# Patient Record
Sex: MALE | Race: WHITE | NOT HISPANIC OR LATINO | URBAN - METROPOLITAN AREA
[De-identification: names, ages, dates, MRNs, and addresses within clinical notes are randomized per-mention and may not be internally consistent; named-entity substitution may affect disease eponyms.]

---

## 2018-05-02 ENCOUNTER — OUTPATIENT (OUTPATIENT)
Dept: OUTPATIENT SERVICES | Facility: HOSPITAL | Age: 58
LOS: 1 days | Discharge: HOME | End: 2018-05-02

## 2018-05-02 VITALS
OXYGEN SATURATION: 100 % | HEIGHT: 70 IN | SYSTOLIC BLOOD PRESSURE: 124 MMHG | TEMPERATURE: 96 F | DIASTOLIC BLOOD PRESSURE: 69 MMHG | RESPIRATION RATE: 16 BRPM | HEART RATE: 72 BPM | WEIGHT: 195.99 LBS

## 2018-05-02 DIAGNOSIS — Z98.890 OTHER SPECIFIED POSTPROCEDURAL STATES: Chronic | ICD-10-CM

## 2018-05-02 DIAGNOSIS — M65.341 TRIGGER FINGER, RIGHT RING FINGER: ICD-10-CM

## 2018-05-02 DIAGNOSIS — Z01.818 ENCOUNTER FOR OTHER PREPROCEDURAL EXAMINATION: ICD-10-CM

## 2018-05-02 LAB
ALBUMIN SERPL ELPH-MCNC: 4.8 G/DL — SIGNIFICANT CHANGE UP (ref 3.5–5.2)
ALP SERPL-CCNC: 87 U/L — SIGNIFICANT CHANGE UP (ref 30–115)
ALT FLD-CCNC: 77 U/L — HIGH (ref 0–41)
ANION GAP SERPL CALC-SCNC: 15 MMOL/L — HIGH (ref 7–14)
APTT BLD: 28.2 SEC — SIGNIFICANT CHANGE UP (ref 27–39.2)
AST SERPL-CCNC: 61 U/L — HIGH (ref 0–41)
BASOPHILS # BLD AUTO: 0.02 K/UL — SIGNIFICANT CHANGE UP (ref 0–0.2)
BASOPHILS NFR BLD AUTO: 0.4 % — SIGNIFICANT CHANGE UP (ref 0–1)
BILIRUB SERPL-MCNC: 0.9 MG/DL — SIGNIFICANT CHANGE UP (ref 0.2–1.2)
BUN SERPL-MCNC: 12 MG/DL — SIGNIFICANT CHANGE UP (ref 10–20)
CALCIUM SERPL-MCNC: 9.6 MG/DL — SIGNIFICANT CHANGE UP (ref 8.5–10.1)
CHLORIDE SERPL-SCNC: 100 MMOL/L — SIGNIFICANT CHANGE UP (ref 98–110)
CO2 SERPL-SCNC: 29 MMOL/L — SIGNIFICANT CHANGE UP (ref 17–32)
CREAT SERPL-MCNC: 0.7 MG/DL — SIGNIFICANT CHANGE UP (ref 0.7–1.5)
EOSINOPHIL # BLD AUTO: 0.04 K/UL — SIGNIFICANT CHANGE UP (ref 0–0.7)
EOSINOPHIL NFR BLD AUTO: 0.8 % — SIGNIFICANT CHANGE UP (ref 0–8)
GLUCOSE SERPL-MCNC: 90 MG/DL — SIGNIFICANT CHANGE UP (ref 70–99)
HCT VFR BLD CALC: 43.1 % — SIGNIFICANT CHANGE UP (ref 42–52)
HGB BLD-MCNC: 14.9 G/DL — SIGNIFICANT CHANGE UP (ref 14–18)
IMM GRANULOCYTES NFR BLD AUTO: 0.4 % — HIGH (ref 0.1–0.3)
INR BLD: 0.91 RATIO — SIGNIFICANT CHANGE UP (ref 0.65–1.3)
LYMPHOCYTES # BLD AUTO: 1.3 K/UL — SIGNIFICANT CHANGE UP (ref 1.2–3.4)
LYMPHOCYTES # BLD AUTO: 25.7 % — SIGNIFICANT CHANGE UP (ref 20.5–51.1)
MCHC RBC-ENTMCNC: 28.9 PG — SIGNIFICANT CHANGE UP (ref 27–31)
MCHC RBC-ENTMCNC: 34.6 G/DL — SIGNIFICANT CHANGE UP (ref 32–37)
MCV RBC AUTO: 83.7 FL — SIGNIFICANT CHANGE UP (ref 80–94)
MONOCYTES # BLD AUTO: 0.6 K/UL — SIGNIFICANT CHANGE UP (ref 0.1–0.6)
MONOCYTES NFR BLD AUTO: 11.9 % — HIGH (ref 1.7–9.3)
NEUTROPHILS # BLD AUTO: 3.07 K/UL — SIGNIFICANT CHANGE UP (ref 1.4–6.5)
NEUTROPHILS NFR BLD AUTO: 60.8 % — SIGNIFICANT CHANGE UP (ref 42.2–75.2)
NRBC # BLD: 0 /100 WBCS — SIGNIFICANT CHANGE UP (ref 0–0)
PLATELET # BLD AUTO: 107 K/UL — LOW (ref 130–400)
POTASSIUM SERPL-MCNC: 4.4 MMOL/L — SIGNIFICANT CHANGE UP (ref 3.5–5)
POTASSIUM SERPL-SCNC: 4.4 MMOL/L — SIGNIFICANT CHANGE UP (ref 3.5–5)
PROT SERPL-MCNC: 7.7 G/DL — SIGNIFICANT CHANGE UP (ref 6–8)
PROTHROM AB SERPL-ACNC: 9.8 SEC — LOW (ref 9.95–12.87)
RBC # BLD: 5.15 M/UL — SIGNIFICANT CHANGE UP (ref 4.7–6.1)
RBC # FLD: 13.6 % — SIGNIFICANT CHANGE UP (ref 11.5–14.5)
SODIUM SERPL-SCNC: 144 MMOL/L — SIGNIFICANT CHANGE UP (ref 135–146)
WBC # BLD: 5.05 K/UL — SIGNIFICANT CHANGE UP (ref 4.8–10.8)
WBC # FLD AUTO: 5.05 K/UL — SIGNIFICANT CHANGE UP (ref 4.8–10.8)

## 2018-05-02 NOTE — H&P PST ADULT - REASON FOR ADMISSION
57 yr old man to past for  right open carpal tunnel release and little finger trigger release under lsb dr irvin rt hand dominant noted s/s  x 20 yrs now for sx no fever no uti uri cp palp sob pain at this time ex ryan 3 fos no lissett screen revd

## 2018-05-02 NOTE — H&P PST ADULT - ADDITIONAL PE
no lissett no loose teeth tmd > 3 fbd neck from rt hand dominant rt little finger trigger finger noted cts b/l per pt + pulses

## 2018-05-02 NOTE — H&P PST ADULT - FAMILY HISTORY
Mother  Still living? Unknown  CAD (coronary artery disease), Age at diagnosis: Age Unknown  HTN (hypertension), Age at diagnosis: Age Unknown     Father  Still living? Unknown  HTN (hypertension), Age at diagnosis: Age Unknown

## 2018-05-02 NOTE — H&P PST ADULT - PMH
Hepatitis C, chronic  dx 2yr no tx per pt  HIV (human immunodeficiency virus infection)  x 25 yrs  Thrombocytopenia  2 yr plt ct low 32 high 150

## 2018-05-02 NOTE — H&P PST ADULT - NSANTHOSAYNRD_GEN_A_CORE
No. MARLEE screening performed.  STOP BANG Legend: 0-2 = LOW Risk; 3-4 = INTERMEDIATE Risk; 5-8 = HIGH Risk

## 2018-05-02 NOTE — H&P PST ADULT - PSH
History of surgery  lt hip i and d 24 yrs  History of surgery  rt elbow sx child  bone spur  History of surgery  ls laminectomy  97 98

## 2018-05-09 PROBLEM — D69.6 THROMBOCYTOPENIA, UNSPECIFIED: Chronic | Status: ACTIVE | Noted: 2018-05-02

## 2018-05-09 PROBLEM — B20 HUMAN IMMUNODEFICIENCY VIRUS [HIV] DISEASE: Chronic | Status: ACTIVE | Noted: 2018-05-02

## 2018-05-09 PROBLEM — B18.2 CHRONIC VIRAL HEPATITIS C: Chronic | Status: ACTIVE | Noted: 2018-05-02

## 2018-05-16 ENCOUNTER — OUTPATIENT (OUTPATIENT)
Dept: OUTPATIENT SERVICES | Facility: HOSPITAL | Age: 58
LOS: 1 days | Discharge: HOME | End: 2018-05-16

## 2018-05-16 VITALS — RESPIRATION RATE: 20 BRPM | OXYGEN SATURATION: 96 % | TEMPERATURE: 98 F | HEART RATE: 80 BPM

## 2018-05-16 VITALS
RESPIRATION RATE: 18 BRPM | OXYGEN SATURATION: 96 % | HEIGHT: 70 IN | DIASTOLIC BLOOD PRESSURE: 86 MMHG | HEART RATE: 86 BPM | WEIGHT: 195.99 LBS | SYSTOLIC BLOOD PRESSURE: 140 MMHG | TEMPERATURE: 99 F

## 2018-05-16 DIAGNOSIS — Z98.890 OTHER SPECIFIED POSTPROCEDURAL STATES: Chronic | ICD-10-CM

## 2018-05-16 RX ORDER — HYDROMORPHONE HYDROCHLORIDE 2 MG/ML
1 INJECTION INTRAMUSCULAR; INTRAVENOUS; SUBCUTANEOUS
Qty: 0 | Refills: 0 | Status: DISCONTINUED | OUTPATIENT
Start: 2018-05-16 | End: 2018-05-16

## 2018-05-16 RX ORDER — IBUPROFEN 200 MG
1 TABLET ORAL
Qty: 20 | Refills: 0 | OUTPATIENT
Start: 2018-05-16

## 2018-05-16 RX ORDER — HYDROMORPHONE HYDROCHLORIDE 2 MG/ML
0.5 INJECTION INTRAMUSCULAR; INTRAVENOUS; SUBCUTANEOUS
Qty: 0 | Refills: 0 | Status: DISCONTINUED | OUTPATIENT
Start: 2018-05-16 | End: 2018-05-16

## 2018-05-16 RX ORDER — SODIUM CHLORIDE 9 MG/ML
1000 INJECTION, SOLUTION INTRAVENOUS
Qty: 0 | Refills: 0 | Status: DISCONTINUED | OUTPATIENT
Start: 2018-05-16 | End: 2018-05-31

## 2018-05-16 RX ORDER — OXYCODONE AND ACETAMINOPHEN 5; 325 MG/1; MG/1
1 TABLET ORAL EVERY 4 HOURS
Qty: 0 | Refills: 0 | Status: DISCONTINUED | OUTPATIENT
Start: 2018-05-16 | End: 2018-05-16

## 2018-05-16 RX ORDER — ONDANSETRON 8 MG/1
4 TABLET, FILM COATED ORAL ONCE
Qty: 0 | Refills: 0 | Status: DISCONTINUED | OUTPATIENT
Start: 2018-05-16 | End: 2018-05-31

## 2018-05-16 RX ADMIN — SODIUM CHLORIDE 100 MILLILITER(S): 9 INJECTION, SOLUTION INTRAVENOUS at 15:27

## 2018-05-16 NOTE — BRIEF OPERATIVE NOTE - POST-OP DX
Carpal tunnel syndrome of right wrist  05/16/2018    Active  Black Renee  Trigger finger, right little finger  05/16/2018    Active  Black Renee

## 2018-05-16 NOTE — BRIEF OPERATIVE NOTE - PROCEDURE
<<-----Click on this checkbox to enter Procedure Release of trigger finger of right little finger  05/16/2018    Active  KRISTINE  Open release of right carpal tunnel  05/16/2018    Active  KRISTINE

## 2018-05-16 NOTE — ASU DISCHARGE PLAN (ADULT/PEDIATRIC). - SPECIAL INSTRUCTIONS

## 2018-05-22 DIAGNOSIS — Z21 ASYMPTOMATIC HUMAN IMMUNODEFICIENCY VIRUS [HIV] INFECTION STATUS: ICD-10-CM

## 2018-05-22 DIAGNOSIS — Z88.0 ALLERGY STATUS TO PENICILLIN: ICD-10-CM

## 2018-05-22 DIAGNOSIS — G56.01 CARPAL TUNNEL SYNDROME, RIGHT UPPER LIMB: ICD-10-CM

## 2018-05-22 DIAGNOSIS — D69.6 THROMBOCYTOPENIA, UNSPECIFIED: ICD-10-CM

## 2018-05-22 DIAGNOSIS — M65.351 TRIGGER FINGER, RIGHT LITTLE FINGER: ICD-10-CM

## 2018-05-22 DIAGNOSIS — F17.210 NICOTINE DEPENDENCE, CIGARETTES, UNCOMPLICATED: ICD-10-CM

## 2018-06-20 ENCOUNTER — OUTPATIENT (OUTPATIENT)
Dept: OUTPATIENT SERVICES | Facility: HOSPITAL | Age: 58
LOS: 1 days | Discharge: HOME | End: 2018-06-20

## 2018-06-20 VITALS
SYSTOLIC BLOOD PRESSURE: 170 MMHG | HEART RATE: 73 BPM | HEIGHT: 70 IN | OXYGEN SATURATION: 96 % | DIASTOLIC BLOOD PRESSURE: 89 MMHG | TEMPERATURE: 99 F | WEIGHT: 195.99 LBS | RESPIRATION RATE: 20 BRPM

## 2018-06-20 VITALS
OXYGEN SATURATION: 97 % | HEART RATE: 58 BPM | DIASTOLIC BLOOD PRESSURE: 81 MMHG | RESPIRATION RATE: 18 BRPM | SYSTOLIC BLOOD PRESSURE: 146 MMHG

## 2018-06-20 DIAGNOSIS — Z98.890 OTHER SPECIFIED POSTPROCEDURAL STATES: Chronic | ICD-10-CM

## 2018-06-20 RX ORDER — ONDANSETRON 8 MG/1
4 TABLET, FILM COATED ORAL ONCE
Qty: 0 | Refills: 0 | Status: DISCONTINUED | OUTPATIENT
Start: 2018-06-20 | End: 2018-07-05

## 2018-06-20 RX ORDER — SODIUM CHLORIDE 9 MG/ML
1000 INJECTION, SOLUTION INTRAVENOUS
Qty: 0 | Refills: 0 | Status: DISCONTINUED | OUTPATIENT
Start: 2018-06-20 | End: 2018-07-05

## 2018-06-20 RX ORDER — MORPHINE SULFATE 50 MG/1
2 CAPSULE, EXTENDED RELEASE ORAL ONCE
Qty: 0 | Refills: 0 | Status: DISCONTINUED | OUTPATIENT
Start: 2018-06-20 | End: 2018-06-20

## 2018-06-20 RX ORDER — IBUPROFEN 200 MG
1 TABLET ORAL
Qty: 20 | Refills: 0
Start: 2018-06-20

## 2018-06-20 RX ORDER — OXYCODONE AND ACETAMINOPHEN 5; 325 MG/1; MG/1
1 TABLET ORAL ONCE
Qty: 0 | Refills: 0 | Status: DISCONTINUED | OUTPATIENT
Start: 2018-06-20 | End: 2018-06-20

## 2018-06-20 RX ADMIN — SODIUM CHLORIDE 100 MILLILITER(S): 9 INJECTION, SOLUTION INTRAVENOUS at 08:34

## 2018-06-20 NOTE — BRIEF OPERATIVE NOTE - POST-OP DX
Carpal tunnel syndrome of left wrist  06/20/2018    Active  Black Renee  Trigger finger, left middle finger  06/20/2018    Active  Black Renee  Trigger finger, left ring finger  06/20/2018    Active  Black Renee

## 2018-06-20 NOTE — BRIEF OPERATIVE NOTE - PROCEDURE
<<-----Click on this checkbox to enter Procedure Release of trigger finger of left ring finger  06/20/2018    Active  VRUGGIERO  Release of trigger finger of left middle finger  06/20/2018    Active  VRUGGIERO  Open release of left carpal tunnel  06/20/2018    Active  VRUGGIERO

## 2018-06-20 NOTE — ASU DISCHARGE PLAN (ADULT/PEDIATRIC). - SPECIAL INSTRUCTIONS

## 2018-06-20 NOTE — CHART NOTE - NSCHARTNOTEFT_GEN_A_CORE
PACU ANESTHESIA ADMISSION NOTE      Procedure: left open carpal tunnel release and left middle finger and left ringer trigger release  Post op diagnosis:  left carpal tunnel    ____  Intubated  TV:______       Rate: ______      FiO2: ______    _x___  Patent Airway    __x__  Full return of protective reflexes    __x__  Full recovery from anesthesia / back to baseline     Vitals:   T:    98       R:     20             BP:   130/77               Sat:      97             P: 62      Mental Status:  ____ xAwake   _____ Alert   _____ Drowsy   _____ Sedated    Nausea/Vomiting:  __x_ NO  ______Yes,   See Post - Op Orders          Pain Scale (0-10):  ____0_    Treatment: ____ None    ____ See Post - Op/PCA Orders    Post - Operative Fluids:   ____ Oral   __x__ See Post - Op Orders    Plan: Discharge:   _x___Home       _____Floor     _____Critical Care    _____  Other:_________________    Comments: Pt with no acute signs or symptoms of anesthesia complications.

## 2018-06-25 DIAGNOSIS — M65.332 TRIGGER FINGER, LEFT MIDDLE FINGER: ICD-10-CM

## 2018-06-25 DIAGNOSIS — D69.6 THROMBOCYTOPENIA, UNSPECIFIED: ICD-10-CM

## 2018-06-25 DIAGNOSIS — B18.2 CHRONIC VIRAL HEPATITIS C: ICD-10-CM

## 2018-06-25 DIAGNOSIS — Z88.0 ALLERGY STATUS TO PENICILLIN: ICD-10-CM

## 2018-06-25 DIAGNOSIS — F10.10 ALCOHOL ABUSE, UNCOMPLICATED: ICD-10-CM

## 2018-06-25 DIAGNOSIS — G56.02 CARPAL TUNNEL SYNDROME, LEFT UPPER LIMB: ICD-10-CM

## 2018-06-25 DIAGNOSIS — M65.342 TRIGGER FINGER, LEFT RING FINGER: ICD-10-CM

## 2018-06-25 DIAGNOSIS — B20 HUMAN IMMUNODEFICIENCY VIRUS [HIV] DISEASE: ICD-10-CM

## 2018-06-25 DIAGNOSIS — F17.210 NICOTINE DEPENDENCE, CIGARETTES, UNCOMPLICATED: ICD-10-CM

## 2019-09-18 ENCOUNTER — TRANSCRIPTION ENCOUNTER (OUTPATIENT)
Age: 59
End: 2019-09-18

## 2020-02-05 PROBLEM — Z00.00 ENCOUNTER FOR PREVENTIVE HEALTH EXAMINATION: Status: ACTIVE | Noted: 2020-02-05

## 2020-02-10 ENCOUNTER — APPOINTMENT (OUTPATIENT)
Dept: ORTHOPEDIC SURGERY | Facility: CLINIC | Age: 60
End: 2020-02-10
Payer: COMMERCIAL

## 2020-02-10 DIAGNOSIS — B20 HUMAN IMMUNODEFICIENCY VIRUS [HIV] DISEASE: ICD-10-CM

## 2020-02-10 DIAGNOSIS — Z82.61 FAMILY HISTORY OF ARTHRITIS: ICD-10-CM

## 2020-02-10 DIAGNOSIS — Z87.39 PERSONAL HISTORY OF OTHER DISEASES OF THE MUSCULOSKELETAL SYSTEM AND CONNECTIVE TISSUE: ICD-10-CM

## 2020-02-10 DIAGNOSIS — M25.552 PAIN IN LEFT HIP: ICD-10-CM

## 2020-02-10 DIAGNOSIS — Z88.0 ALLERGY STATUS TO PENICILLIN: ICD-10-CM

## 2020-02-10 DIAGNOSIS — M16.12 UNILATERAL PRIMARY OSTEOARTHRITIS, LEFT HIP: ICD-10-CM

## 2020-02-10 DIAGNOSIS — M25.562 PAIN IN LEFT KNEE: ICD-10-CM

## 2020-02-10 DIAGNOSIS — Z86.19 PERSONAL HISTORY OF OTHER INFECTIOUS AND PARASITIC DISEASES: ICD-10-CM

## 2020-02-10 DIAGNOSIS — Z86.79 PERSONAL HISTORY OF OTHER DISEASES OF THE CIRCULATORY SYSTEM: ICD-10-CM

## 2020-02-10 DIAGNOSIS — Z80.9 FAMILY HISTORY OF MALIGNANT NEOPLASM, UNSPECIFIED: ICD-10-CM

## 2020-02-10 PROCEDURE — 73502 X-RAY EXAM HIP UNI 2-3 VIEWS: CPT | Mod: LT

## 2020-02-10 PROCEDURE — 99204 OFFICE O/P NEW MOD 45 MIN: CPT

## 2020-02-10 RX ORDER — ABACAVIR SULFATE, DOLUTEGRAVIR SODIUM, LAMIVUDINE 600; 50; 300 MG/1; MG/1; MG/1
600-50-300 TABLET, FILM COATED ORAL
Refills: 0 | Status: ACTIVE | COMMUNITY

## 2020-02-13 NOTE — ASSESSMENT
[FreeTextEntry1] : 59 y.o. male , HIV positive, HEP C positive. Pt Hx is , he was an IV drug abuser that contracted HIV. He states that he has been clean for over 25 years. 27 years ago he was hospitalized with a blood infection. During that hospital stay he was worked up for an infection in his left hip. The hip was aspirated but he does not recall if the aspiration was positive or wether he was placed on prolonged antibody therapy for this. Pt is here today because of severe left hip and knee pain. The pain is 7/10 , he can walk about a block ,he has difficulty negotiating stairs, putting on shoes and socks and doing ADLs. Before we contemplate Sx the pt Hep C must be treated , we must determine if his T cell count is optimized and viral load undetectable. He must undergo allergy testing for PCN allergy and he must undergo workup to evaluate the left hip for occult osteomyelitis. We have ordered a bone scan, WBC scan, ESR and CRP and MRI.

## 2020-02-13 NOTE — HISTORY OF PRESENT ILLNESS
[] : left hip [Worsening] : worsening [Pain Location] : pain [7] : a maximum pain level of 7/10 [Walking] : walking [Sitting] : sitting [Standing] : standing [Constant] : ~He/She~ states the symptoms seem to be constant [de-identified] : 59 year old male with a significant past medical history of HIV, HTN, and Hepatitis C presents to the office today complaining of left hip pain for several years and left knee pain x1.5 years. Pt reports pain that is a constant ache and can become sharp at times. He reports swelling in the left knee. He also reports buckling, locking, clicking, and a loss of motion in the left hip. Pt reports only being able to ambulate about one block before pain stops him. He reports increased pain and difficulty with negotiating stairs and states the pain is worse with ascending. Pt states he cant put socks and shoes on for several years now. He also reports not being able to lay on his side due to the pain. Pt reports taking Ibuprofen for pain without much relief. Pt reports having his left hip aspirated about 27 years ago after having an infection due to his history of IV drug abuse.

## 2020-02-13 NOTE — PHYSICAL EXAM
[de-identified] : General appearance: well nourished and hydrated, pleasant, alert and oriented x 3, cooperative.\par Cardiovascular: no apparent abnormalities, no lower leg edema, no varicosities, pedal pulses are palpable.\par Neurologic: sensation is normal, no muscle weakness in upper or lower extremities\par Dermatologic no apparent skin lesions, moist, warm, no rash.\par Gait: nonantalgic.\par \par Left knee\par Inspection: no effusion or erythema.\par Wounds: none.\par Alignment: normal.\par Palpation: no specific tenderness on palpation.\par ROM: 0-120 degrees \par Ligamentous laxity: all ligaments appear stable\par Muscle Test: good quad strength.\par \par Left hip\par Inspection: No swelling or ecchymosis.\par Wounds: none.\par Palpation: non-tender.\par Stability: no instability.\par Strength: 5/5 all motor groups.\par ROM: restricted painful ROM \par Leg length: equal. [de-identified] : Radiographs done today AP lateral and skyline of the left knee shows well maintained joint spaces in  the left knee. \par \par Radiographs done today AP pelvis and lateral of hips shows complete loss of joint space of left hip with sclerosis and osteophyte formation.

## 2022-04-15 ENCOUNTER — OUTPATIENT (OUTPATIENT)
Dept: OUTPATIENT SERVICES | Facility: HOSPITAL | Age: 62
LOS: 1 days | Discharge: HOME | End: 2022-04-15
Payer: COMMERCIAL

## 2022-04-15 DIAGNOSIS — R13.10 DYSPHAGIA, UNSPECIFIED: ICD-10-CM

## 2022-04-15 DIAGNOSIS — Z98.890 OTHER SPECIFIED POSTPROCEDURAL STATES: Chronic | ICD-10-CM

## 2022-04-15 PROCEDURE — 74220 X-RAY XM ESOPHAGUS 1CNTRST: CPT | Mod: 26

## 2022-06-27 ENCOUNTER — OUTPATIENT (OUTPATIENT)
Dept: OUTPATIENT SERVICES | Facility: HOSPITAL | Age: 62
LOS: 1 days | Discharge: HOME | End: 2022-06-27

## 2022-06-27 DIAGNOSIS — Z98.890 OTHER SPECIFIED POSTPROCEDURAL STATES: Chronic | ICD-10-CM

## 2022-06-27 DIAGNOSIS — R07.9 CHEST PAIN, UNSPECIFIED: ICD-10-CM

## 2022-06-27 PROCEDURE — 75574 CT ANGIO HRT W/3D IMAGE: CPT | Mod: 26

## 2022-06-28 ENCOUNTER — OUTPATIENT (OUTPATIENT)
Dept: OUTPATIENT SERVICES | Facility: HOSPITAL | Age: 62
LOS: 1 days | Discharge: HOME | End: 2022-06-28

## 2022-06-28 DIAGNOSIS — Z98.890 OTHER SPECIFIED POSTPROCEDURAL STATES: Chronic | ICD-10-CM

## 2022-06-28 PROCEDURE — 0504T: CPT

## 2022-06-29 ENCOUNTER — INPATIENT (INPATIENT)
Facility: HOSPITAL | Age: 62
LOS: 6 days | Discharge: HOME HEALTH | End: 2022-07-06
Attending: THORACIC SURGERY (CARDIOTHORACIC VASCULAR SURGERY) | Admitting: THORACIC SURGERY (CARDIOTHORACIC VASCULAR SURGERY)

## 2022-06-29 VITALS
OXYGEN SATURATION: 98 % | HEART RATE: 86 BPM | WEIGHT: 192.9 LBS | HEIGHT: 70 IN | DIASTOLIC BLOOD PRESSURE: 78 MMHG | SYSTOLIC BLOOD PRESSURE: 156 MMHG | RESPIRATION RATE: 12 BRPM

## 2022-06-29 DIAGNOSIS — Z98.890 OTHER SPECIFIED POSTPROCEDURAL STATES: Chronic | ICD-10-CM

## 2022-06-29 DIAGNOSIS — R07.9 CHEST PAIN, UNSPECIFIED: ICD-10-CM

## 2022-06-29 LAB
A1C WITH ESTIMATED AVERAGE GLUCOSE RESULT: 4.7 % — SIGNIFICANT CHANGE UP (ref 4–5.6)
ALBUMIN SERPL ELPH-MCNC: 4.5 G/DL — SIGNIFICANT CHANGE UP (ref 3.5–5.2)
ALP SERPL-CCNC: 106 U/L — SIGNIFICANT CHANGE UP (ref 30–115)
ALT FLD-CCNC: 15 U/L — SIGNIFICANT CHANGE UP (ref 0–41)
ANION GAP SERPL CALC-SCNC: 11 MMOL/L — SIGNIFICANT CHANGE UP (ref 7–14)
ANION GAP SERPL CALC-SCNC: 12 MMOL/L — SIGNIFICANT CHANGE UP (ref 7–14)
APPEARANCE UR: CLEAR — SIGNIFICANT CHANGE UP
APTT BLD: 102.8 SEC — CRITICAL HIGH (ref 27–39.2)
APTT BLD: 28 SEC — SIGNIFICANT CHANGE UP (ref 27–39.2)
AST SERPL-CCNC: 15 U/L — SIGNIFICANT CHANGE UP (ref 0–41)
BILIRUB SERPL-MCNC: 0.3 MG/DL — SIGNIFICANT CHANGE UP (ref 0.2–1.2)
BILIRUB UR-MCNC: NEGATIVE — SIGNIFICANT CHANGE UP
BLD GP AB SCN SERPL QL: SIGNIFICANT CHANGE UP
BUN SERPL-MCNC: 15 MG/DL — SIGNIFICANT CHANGE UP (ref 10–20)
BUN SERPL-MCNC: 19 MG/DL — SIGNIFICANT CHANGE UP (ref 10–20)
CALCIUM SERPL-MCNC: 8.7 MG/DL — SIGNIFICANT CHANGE UP (ref 8.5–10.1)
CALCIUM SERPL-MCNC: 9.5 MG/DL — SIGNIFICANT CHANGE UP (ref 8.5–10.1)
CHLORIDE SERPL-SCNC: 101 MMOL/L — SIGNIFICANT CHANGE UP (ref 98–110)
CHLORIDE SERPL-SCNC: 102 MMOL/L — SIGNIFICANT CHANGE UP (ref 98–110)
CHOLEST SERPL-MCNC: 201 MG/DL — HIGH
CO2 SERPL-SCNC: 24 MMOL/L — SIGNIFICANT CHANGE UP (ref 17–32)
CO2 SERPL-SCNC: 26 MMOL/L — SIGNIFICANT CHANGE UP (ref 17–32)
COLOR SPEC: YELLOW — SIGNIFICANT CHANGE UP
CREAT SERPL-MCNC: 0.7 MG/DL — SIGNIFICANT CHANGE UP (ref 0.7–1.5)
CREAT SERPL-MCNC: 0.9 MG/DL — SIGNIFICANT CHANGE UP (ref 0.7–1.5)
DIFF PNL FLD: NEGATIVE — SIGNIFICANT CHANGE UP
EGFR: 106 ML/MIN/1.73M2 — SIGNIFICANT CHANGE UP
EGFR: 98 ML/MIN/1.73M2 — SIGNIFICANT CHANGE UP
ESTIMATED AVERAGE GLUCOSE: 88 MG/DL — SIGNIFICANT CHANGE UP (ref 68–114)
GLUCOSE SERPL-MCNC: 115 MG/DL — HIGH (ref 70–99)
GLUCOSE SERPL-MCNC: 129 MG/DL — HIGH (ref 70–99)
GLUCOSE UR QL: NEGATIVE — SIGNIFICANT CHANGE UP
HCT VFR BLD CALC: 32.6 % — LOW (ref 42–52)
HCT VFR BLD CALC: 36.1 % — LOW (ref 42–52)
HDLC SERPL-MCNC: 39 MG/DL — LOW
HGB BLD-MCNC: 11 G/DL — LOW (ref 14–18)
HGB BLD-MCNC: 12.2 G/DL — LOW (ref 14–18)
INR BLD: 1.15 RATIO — SIGNIFICANT CHANGE UP (ref 0.65–1.3)
KETONES UR-MCNC: NEGATIVE — SIGNIFICANT CHANGE UP
LEUKOCYTE ESTERASE UR-ACNC: NEGATIVE — SIGNIFICANT CHANGE UP
LIPID PNL WITH DIRECT LDL SERPL: 129 MG/DL — HIGH
MCHC RBC-ENTMCNC: 28.1 PG — SIGNIFICANT CHANGE UP (ref 27–31)
MCHC RBC-ENTMCNC: 28.6 PG — SIGNIFICANT CHANGE UP (ref 27–31)
MCHC RBC-ENTMCNC: 33.7 G/DL — SIGNIFICANT CHANGE UP (ref 32–37)
MCHC RBC-ENTMCNC: 33.8 G/DL — SIGNIFICANT CHANGE UP (ref 32–37)
MCV RBC AUTO: 83.4 FL — SIGNIFICANT CHANGE UP (ref 80–94)
MCV RBC AUTO: 84.7 FL — SIGNIFICANT CHANGE UP (ref 80–94)
NITRITE UR-MCNC: NEGATIVE — SIGNIFICANT CHANGE UP
NON HDL CHOLESTEROL: 162 MG/DL — HIGH
NRBC # BLD: 0 /100 WBCS — SIGNIFICANT CHANGE UP (ref 0–0)
NRBC # BLD: 0 /100 WBCS — SIGNIFICANT CHANGE UP (ref 0–0)
NT-PROBNP SERPL-SCNC: 708 PG/ML — HIGH (ref 0–300)
PH UR: 6.5 — SIGNIFICANT CHANGE UP (ref 5–8)
PLATELET # BLD AUTO: 152 K/UL — SIGNIFICANT CHANGE UP (ref 130–400)
PLATELET # BLD AUTO: 162 K/UL — SIGNIFICANT CHANGE UP (ref 130–400)
POTASSIUM SERPL-MCNC: 3.8 MMOL/L — SIGNIFICANT CHANGE UP (ref 3.5–5)
POTASSIUM SERPL-MCNC: 4.6 MMOL/L — SIGNIFICANT CHANGE UP (ref 3.5–5)
POTASSIUM SERPL-SCNC: 3.8 MMOL/L — SIGNIFICANT CHANGE UP (ref 3.5–5)
POTASSIUM SERPL-SCNC: 4.6 MMOL/L — SIGNIFICANT CHANGE UP (ref 3.5–5)
PROT SERPL-MCNC: 6.7 G/DL — SIGNIFICANT CHANGE UP (ref 6–8)
PROT UR-MCNC: NEGATIVE — SIGNIFICANT CHANGE UP
PROTHROM AB SERPL-ACNC: 13.2 SEC — HIGH (ref 9.95–12.87)
RBC # BLD: 3.91 M/UL — LOW (ref 4.7–6.1)
RBC # BLD: 4.26 M/UL — LOW (ref 4.7–6.1)
RBC # FLD: 15.4 % — HIGH (ref 11.5–14.5)
RBC # FLD: 15.5 % — HIGH (ref 11.5–14.5)
SODIUM SERPL-SCNC: 138 MMOL/L — SIGNIFICANT CHANGE UP (ref 135–146)
SODIUM SERPL-SCNC: 138 MMOL/L — SIGNIFICANT CHANGE UP (ref 135–146)
SP GR SPEC: 1.02 — SIGNIFICANT CHANGE UP (ref 1.01–1.03)
T3 SERPL-MCNC: 93 NG/DL — SIGNIFICANT CHANGE UP (ref 80–200)
T4 AB SER-ACNC: 6.5 UG/DL — SIGNIFICANT CHANGE UP (ref 4.6–12)
TRIGL SERPL-MCNC: 168 MG/DL — HIGH
TSH SERPL-MCNC: 4.22 UIU/ML — HIGH (ref 0.27–4.2)
UROBILINOGEN FLD QL: SIGNIFICANT CHANGE UP
WBC # BLD: 5.1 K/UL — SIGNIFICANT CHANGE UP (ref 4.8–10.8)
WBC # BLD: 6.71 K/UL — SIGNIFICANT CHANGE UP (ref 4.8–10.8)
WBC # FLD AUTO: 5.1 K/UL — SIGNIFICANT CHANGE UP (ref 4.8–10.8)
WBC # FLD AUTO: 6.71 K/UL — SIGNIFICANT CHANGE UP (ref 4.8–10.8)

## 2022-06-29 PROCEDURE — 99223 1ST HOSP IP/OBS HIGH 75: CPT

## 2022-06-29 PROCEDURE — 71250 CT THORAX DX C-: CPT | Mod: 26

## 2022-06-29 PROCEDURE — 93880 EXTRACRANIAL BILAT STUDY: CPT | Mod: 26

## 2022-06-29 PROCEDURE — 93458 L HRT ARTERY/VENTRICLE ANGIO: CPT | Mod: 26

## 2022-06-29 RX ORDER — DAPSONE 100 MG/1
0 TABLET ORAL
Qty: 0 | Refills: 0 | DISCHARGE

## 2022-06-29 RX ORDER — CELECOXIB 200 MG/1
200 CAPSULE ORAL DAILY
Refills: 0 | Status: DISCONTINUED | OUTPATIENT
Start: 2022-06-29 | End: 2022-06-30

## 2022-06-29 RX ORDER — HEPARIN SODIUM 5000 [USP'U]/ML
800 INJECTION INTRAVENOUS; SUBCUTANEOUS
Qty: 25000 | Refills: 0 | Status: DISCONTINUED | OUTPATIENT
Start: 2022-06-29 | End: 2022-06-30

## 2022-06-29 RX ORDER — NITROGLYCERIN 6.5 MG
16.67 CAPSULE, EXTENDED RELEASE ORAL
Qty: 50 | Refills: 0 | Status: DISCONTINUED | OUTPATIENT
Start: 2022-06-29 | End: 2022-07-05

## 2022-06-29 RX ORDER — LISINOPRIL 2.5 MG/1
1 TABLET ORAL
Qty: 0 | Refills: 0 | DISCHARGE

## 2022-06-29 RX ORDER — ASPIRIN/CALCIUM CARB/MAGNESIUM 324 MG
81 TABLET ORAL DAILY
Refills: 0 | Status: DISCONTINUED | OUTPATIENT
Start: 2022-06-29 | End: 2022-07-02

## 2022-06-29 RX ORDER — ABACAVIR SULFATE, DOLUTEGRAVIR SODIUM, LAMIVUDINE 60-5-30 MG
1 KIT ORAL
Qty: 0 | Refills: 0 | DISCHARGE

## 2022-06-29 RX ORDER — ATORVASTATIN CALCIUM 80 MG/1
40 TABLET, FILM COATED ORAL AT BEDTIME
Refills: 0 | Status: DISCONTINUED | OUTPATIENT
Start: 2022-06-29 | End: 2022-07-05

## 2022-06-29 RX ORDER — SULFASALAZINE 500 MG
1000 TABLET ORAL EVERY 12 HOURS
Refills: 0 | Status: DISCONTINUED | OUTPATIENT
Start: 2022-06-29 | End: 2022-06-30

## 2022-06-29 RX ORDER — HEPARIN SODIUM 5000 [USP'U]/ML
5000 INJECTION INTRAVENOUS; SUBCUTANEOUS EVERY 8 HOURS
Refills: 0 | Status: DISCONTINUED | OUTPATIENT
Start: 2022-06-29 | End: 2022-06-29

## 2022-06-29 RX ORDER — METOPROLOL TARTRATE 50 MG
12.5 TABLET ORAL EVERY 12 HOURS
Refills: 0 | Status: DISCONTINUED | OUTPATIENT
Start: 2022-06-29 | End: 2022-06-30

## 2022-06-29 RX ORDER — SULFASALAZINE 500 MG
2 TABLET ORAL
Qty: 0 | Refills: 0 | DISCHARGE

## 2022-06-29 RX ORDER — FAMOTIDINE 10 MG/ML
20 INJECTION INTRAVENOUS
Refills: 0 | Status: DISCONTINUED | OUTPATIENT
Start: 2022-06-29 | End: 2022-07-06

## 2022-06-29 RX ORDER — SODIUM CHLORIDE 9 MG/ML
3 INJECTION INTRAMUSCULAR; INTRAVENOUS; SUBCUTANEOUS EVERY 8 HOURS
Refills: 0 | Status: DISCONTINUED | OUTPATIENT
Start: 2022-06-29 | End: 2022-07-03

## 2022-06-29 RX ORDER — DAPSONE 100 MG/1
100 TABLET ORAL
Refills: 0 | Status: DISCONTINUED | OUTPATIENT
Start: 2022-06-29 | End: 2022-06-30

## 2022-06-29 RX ORDER — HYOSCYAMINE SULFATE 0.13 MG
1 TABLET ORAL
Qty: 0 | Refills: 0 | DISCHARGE

## 2022-06-29 RX ADMIN — HEPARIN SODIUM 8 UNIT(S)/HR: 5000 INJECTION INTRAVENOUS; SUBCUTANEOUS at 19:30

## 2022-06-29 RX ADMIN — ATORVASTATIN CALCIUM 40 MILLIGRAM(S): 80 TABLET, FILM COATED ORAL at 23:06

## 2022-06-29 RX ADMIN — FAMOTIDINE 20 MILLIGRAM(S): 10 INJECTION INTRAVENOUS at 19:49

## 2022-06-29 RX ADMIN — Medication 12.5 MILLIGRAM(S): at 19:49

## 2022-06-29 RX ADMIN — SODIUM CHLORIDE 3 MILLILITER(S): 9 INJECTION INTRAMUSCULAR; INTRAVENOUS; SUBCUTANEOUS at 22:00

## 2022-06-29 RX ADMIN — Medication 1000 MILLIGRAM(S): at 21:22

## 2022-06-29 RX ADMIN — Medication 5 MICROGRAM(S)/MIN: at 20:27

## 2022-06-29 NOTE — H&P CARDIOLOGY - HISTORY OF PRESENT ILLNESS
61 y/old M here for [facility] due to chest pain, + CT ANGIO HEART CORONARY ( CS = 2032 )  PMH: HIV, Formal drug user, RA, GERD, Hep C, Thrombocytopenia, Esophageal spasm     Pre cath note:    indication:  [ ] STEMI                [ ] NSTEMI                 [ ] Acute coronary syndrome                     [ ]Unstable Angina   [ ] high risk  [ ] intermediate risk  [ ] low risk                     [x] Stable Angina     non-invasive testing:  CT ANGIO HEART CORONARY       Date: 6/27/22            result: [ ] high risk  [ x] intermediate risk  [ ] low risk    Anti- Anginal medications:                    [ ] not used                       [ ] used                   [ ] not used but strong indication not to use    Ejection Fraction                   [ ] <29            [ ] 30-39%   [ ] 40-49%     [ ]>50%    CHF                   [ ] active (within last 14 days on meds   [ ] Chronic (on meds but no exacerbation)    COPD                   [ ] mild (on chronic bronchodilators)  [ ] moderate (on chronic steroid therapy)      [ ] severe (indication for home O2 or PACO2 >50)    Other risk factors:                       [ ] Previous MI                     [ ] CVA/ stroke                    [ ] carotid stent/ CEA                    [ ] PVD/PAD- (arterial aneurysm, non-palpable pulses, tortuous vessel with inability to insert catheter, infra-renal dissection, renal or subclavian artery stenosis)                    [ ] diabetic                    [ ] previous CABG                    [ ] Renal Failure                           12.2   6.71  )-----------( 162      ( 29 Jun 2022 11:40 )             36.1       Adjusted CathPCI Bleeding Event Risk: blood work pending  %  Bhavin Test ( Normal  )                       PMH/PSH/FH/SH:    Past Medical History:  Hepatitis C, chronic  dx 2yr no tx per pt  HIV (human immunodeficiency virus infection)  x 25 yrs  Thrombocytopenia  2 yr plt ct low 32 high 150.     Past Surgical History:  History of surgery  right hip i and d 24 yrs  History of surgery  right elbow sx child  bone spur  History of surgery  ls laminectomy  97 98.     Family History:  Mother  Still living? Unknown  CAD (coronary artery disease), Age at diagnosis: Age Unknown  HTN (hypertension), Age at diagnosis: Age Unknown    < from: CT Angio Heart and Coronaries w/ IV Cont (06.27.22 @ 10:50) >  ACC: [id number] EXAM:  CT ANGIO HEART CORONARY IC                          PROCEDURE DATE:  06/27/2022      INTERPRETATION:  CLINICAL INDICATION: Chest pain.    COMPARISON: None.    TECHNIQUE: Unenhanced axial ECG-triggered CT was obtained through the   chest. CT angiography of the heart was then performed utilizing ECG-gated   imaging. 3-D reconstruction images were obtained. In preparation for the   examination, the patient received 0.8mg of sublingual nitroglycerine for   coronary vasodilatation.    CONTRAST: 85 cc of Optiray 320    INTERPRETATION:    Calcium Score:    Vessel              Calcium Score    =======================================================  LM:                    0  LAD:                 447  LCX:                 215  RCA:                 1370  =======================================================  Total:                2032      CORONARY CT ANGIOGRAM:    There is a right dominant coronary arterial system.    Left Main Artery: Noncalcified plaque at the distalsegment resulting in   moderate to severe stenosis.    Left Anterior Descending Artery: Mixed (calcified and noncalcified )   plaque at the ostial segment resulting in moderate to severe stenosis.   Calcified plaque at the proximal and mid segments resulting in severe   stenosis, with significant vessel narrowing after the lesion suggestive   of subtotal occlusion. The distal segment is small caliber with   calcifications, cannot rule out severe stenosis. Diagonal branch:   Calcified plaque at the ostial segment is noted, with no contrast   opacification suggestive of chronic total occlusion. The remainder of the   diagonal branches are not well visualized.    Ramus intermedius: Calcified plaque at the ostium and proximal segments   resultingin chronic total occlusion.    Left Circumflex Artery: Calcified plaques noted at the ostial and   proximal segments. The vessel is small caliber with no contrast   opacification, cannot rule out chronic total occlusion.    Right Coronary Artery: Calcified plaque at the proximal and mid segments   resulting in mild stenosis. Calcified plaque at the mid segment resulting   in moderate stenosis. Mixed (calcified and noncalcified) plaque at the   distal segment resulting in moderate to severe stenosis. RPDA: Calcified   plaque at the ostial segment resulting in moderate stenosis. The   remainder of the vessel has multiple calcifications unable to evaluate   for luminal stenosis. RPL: Mixed plaque at the ostial segment resulting   in moderate to severe stenosis. The remainder of the vessel is not well   visualized.    CARDIAC MORPHOLOGY: The cardiac chambers are normal in size.  There is no   pericardial effusion. Intra-atrial septal aneurysm noted. Mild anterior   mitral leaflet calcifications.    IMAGED AORTA: The thoracic aorta is normal in caliber. Mild aortic root   calcifications..    IMAGED EXTRACARDIAC FINDINGS:  Extracardiac findings section dictated separately by radiology at the end   of the final report.    IMPRESSION:    Significant left Main and triple vessel coronary artery disease as   described above.  The total Agatston coronary artery calcium score equals 2032, which   corresponds to 99th percentile for age, gender and ethnicity.  CAD-RADS 5.  Study will be referredfor FFR CT analysis. Separate report to follow.  Findings discussed with Dr Woo 6/27/22 at 12:40 PM.    IMPRESSION:  IMAGED EXTRACARDIAC FINDINGS:  No potentially significant noncardiac findings in the visualized portions   of the chest and upper abdomen.  Please note that report dictated by radiology will contain only   extracardiac findings. Refer to report dictated by cardiology for   detailed findings related to the heart and great vessels.

## 2022-06-29 NOTE — CONSULT NOTE ADULT - NS ATTEND AMEND GEN_ALL_CORE FT
has severe CAD. needs CABG. explained all the risks of surgery to patient. will admit. CABG in 24 to 48 h. understands that one of my associates may perform surgery. agreeable.

## 2022-06-29 NOTE — ASU PATIENT PROFILE, ADULT - AS [facility]
----- Message from Charlee Yu sent at 1/24/2022 11:25 AM EST -----  Subject: Appointment Request    Reason for Call: Semi-Routine Cough, Cold Symptoms    QUESTIONS  Type of Appointment? Established Patient  Reason for appointment request? No appointments available during search  Additional Information for Provider? Pt called to schedule an appt with   either Dr. Yelena Martinez or Dr. Paulo Delgado due to experiencing Sinus drainage,   headache and chest pain from coughing. Please advise.   ---------------------------------------------------------------------------  --------------  CALL BACK INFO  What is the best way for the office to contact you? OK to leave message on   voicemail  Preferred Call Back Phone Number? [phone]  ---------------------------------------------------------------------------  --------------  SCRIPT ANSWERS  Relationship to Patient? Self  Are you currently unable to finish sentences due to any difficulty   breathing? No  Are you unable to swallow liquids? No  Are you having fevers (100.4 or greater), chills, or sweats? No  Do you have COPD, asthma or a chronic lung condition? No  Have your symptoms been present for more than 5 days? No  Have you recently (14 days) been seen by a provider for this issue? No  Have you been diagnosed with, awaiting test results for, or told that you   are suspected of having COVID-19 (Coronavirus)? (If patient has tested   negative or was tested as a requirement for work, school, or travel and   not based on symptoms, answer no)? No  Within the past two weeks have you developed any of the following symptoms   (answer no if symptoms have been present longer than 2 weeks or began   more than 2 weeks ago)?  Fever or Chills, Cough, Shortness of breath or   difficulty breathing, Loss of taste or smell, Sore throat, Nasal   congestion, Sneezing or runny nose, Fatigue or generalized body aches   (answer no if pain is specific to a body part e.g. back pain), Diarrhea, Headache?  Yes (4) no impairment

## 2022-06-29 NOTE — CONSULT NOTE ADULT - SUBJECTIVE AND OBJECTIVE BOX
Surgeon: / Bruce/ Karen    Cardio: Dr Woo  Primary: Dr Henderson    Consult requesting by: Dr Woo    HISTORY OF PRESENT ILLNESS:  61y Male with PMH of     NYHA functional class    [ ] Class I (no limitation) [ ] Class II (slight limitation) [ ] Class III (marked limitation) [ ] Class IV (symptoms at rest)    PAST MEDICAL & SURGICAL HISTORY:  HIV (human immunodeficiency virus infection)  x 25 yrs      Hepatitis C, chronic  dx 2yr no tx per pt      Thrombocytopenia  2 yr plt ct low 32 high 150      History of surgery  ls laminectomy  97 98      History of surgery  rt elbow sx child  bone spur      History of surgery  lt hip i and d 24 yrs          MEDICATIONS  (STANDING):    MEDICATIONS  (PRN):    Antiplatelet therapy:                           Last dose/amt:    Allergies    penicillins (Hives)    Intolerances        SOCIAL HISTORY:  Smoker: [ ] Yes  [ ] No        PACK YEARS:                         WHEN QUIT?  ETOH use: [ ] Yes  [ ] No              FREQUENCY / QUANTITY:  Ilicit Drug use:  [ ] Yes  [ ] No  Occupation:  Lives with:  Assisted device use:  5 meter walk test: 1____sec, 2____sec, 3___sec  FAMILY HISTORY:  CAD (coronary artery disease) (Mother)    HTN (hypertension) (Mother, Father)        Review of Systems  CONSTITUTIONAL:  Fevers[ ] chills[ ] sweats[ ] fatigue[ ] weight loss[ ] weight gain [ ]                                     NEGATIVE [X ]   NEURO:  parathesias[ ] seizures [ ]  syncope [ ]  confusion [ ]                                                                                NEGATIVE[ X]   EYES: glasses[ ]  blurry vision[ ]  discharge[ ] pain[ ] glaucoma [ ]                                                                          NEGATIVE[X ]   ENMT:  difficulty hearing [ ]  vertigo[ ]  dysphagia[ ] epistaxis[ ] recent dental work [ ]                                    NEGATIVE[ X]   CV:  chest pain[ ] palpitations[ ] SANDOVAL [ ] diaphoresis [ ]                                                                                           NEGATIVE[ X]   RESPIRATORY:  wheezing[ ] SOB[ ] cough [ ] sputum[ ] hemoptysis[ ]                                                                  NEGATIVE[ ]   GI:  nausea[ ]  vommiting [ ]  diarrhea[ ] constipation [ ] melena [ ]                                                                      NEGATIVE[ X]   : hematuria[ ]  dysuria[ ] urgency[ ] incontinence[ ]                                                                                            NEGATIVE[ X]   MUSKULOSKELETAL:  arthritis[ ]  joint swelling [ ] muscle weakness [ ] Hx vein stripping [ ]                             NEGATIVE[X ]   SKIN/BREAST:  rash[ ] itching [ ]  hair loss[ ] masses[ ]                                                                                              NEGATIVE[ X]   PSYCH:  dementia [ ] depresion [ ] anxiety[ ]                                                                                                               NEGATIVE[X ]   HEME/LYMPH:  bruises easily[ ] enlarged lymph nodes[ ] tender lymph nodes[ ]                                               NEGATIVE[ X]   ENDOCRINE:  cold intolerance[ ] heat intolerance[ ] polydipsia[ ]                                                                          NEGATIVE[ X]     PHYSICAL EXAM  Vital Signs Last 24 Hrs  T(C): --  T(F): --  HR: 86 (29 Jun 2022 12:00) (86 - 86)  BP: 156/78 (29 Jun 2022 12:00) (156/78 - 156/78)  BP(mean): 104 (29 Jun 2022 12:00) (104 - 104)  RR: 12 (29 Jun 2022 12:00) (12 - 12)  SpO2: 98% (29 Jun 2022 12:00) (98% - 98%)  Right arm bp: Left arm bp;    CONSTITUTIONAL:                                                                          WNL[ ]   Neuro: WNL[ ] Normal exam oriented to person/place & time with no focal motor or sensory  deficits. Other                     Eyes: WNL[ ]   Normal exam of conjunctiva & lids, pupils equally reactive. Other     ENT: WNL[ ]    Normal exam of nasal/oral mucosa with absence of cyanosis. Other  Neck: WNL[ ]  Normal exam of jugular veins, trachea & thyroid. Other  Chest: WNL[ ] Normal lung exam with good air movement absence of wheezes, rales, or rhonchi: Other                                                                                CV:  Auscultation: normal [ ] S3[ ] S4[ ] Irregular [ ] Rub[ ] Clicks[ ]    Murmurs none:[ ]systolic [ ]  diastolic [ ] holosystolic [ ]  Carotids: No Bruits[ ] Other                 Abdominal Aorta: normal [ ] nonpalpable[ ]Other                                                                                      GI:           WNL[ ] Normal exam of abdomen, liver & spleen with no noted masses or tenderness. Other                                                                                                        Extremities: WNL[ ] Normal no evidence of cyanosis or deformity Edema: none[ ]trace[ ]1+[ ]2+[ ]3+[ ]4+[ ]  Lower Extremity Pulses: Right[ ] Left[ ]Varicosities[ ]  SKIN :WNL[ ] Normal exam to inspection & palation. Other:                                                          LABS:                        12.2   6.71  )-----------( 162      ( 29 Jun 2022 11:40 )             36.1     06-29    138  |  101  |  19  ----------------------------<  129<H>  4.6   |  26  |  0.9    Ca    9.5      29 Jun 2022 11:40                  Cardiac Cath:    TTE / ANNA:    Recommendation: (Procedures/Evaluations)  CT HEAD Nonn-Contrast:[  ]  CT Chest with /without contrast [ ]  Carotid Duplex :[ ]  JORGE/PVR: [ ]  PFT : Simple PFT [ ]  Full [ ]  Renal Consult [ ]  Pulmonary Consult: [ ]   Vascular Consult [ ]    Dental Consult [ ]   Hem-Onc Consult [ ]   GI Consult [ ]   Other Consultations :    STS Score:     Impression:    CAD [ ]  Valvular  disease [ ]   Aortic Disease [ ]   DIOR: Yes[ ] No [ ]   CKD Stage I [ ] , Stage II [ ] , Stage III [ ], Stage IV [ ]   Anemia: Yes [ ], No [ ]  Diabetes :Yes [ ], No [ ]  Acute MI: Yes [ ], No [ ]   Heart Failure: Yes [ ] , No [ ] HFpEF [ ], HFrEF [ ]        Assessment/ Plan:                 Surgeon: / Bruce/ Karen    Cardio: Dr Woo  Primary: Dr Henderson    Consult requesting by: Dr Woo    HISTORY OF PRESENT ILLNESS:  61y Male with PMH of HIV, former drug abuser, ETOH abuse, RA, presented to his cardiologist for 2 years of chest pain on exertion, stress test negative, and normal EKG, had subsequent CTA which showed LM and 3vCAD. Cardiac cath showed LM 90%, 3vCAD. CT surgery consulted for CABG evaluation.     NYHA functional class    [ ] Class I (no limitation) [ ] Class II (slight limitation) [ ] Class III (marked limitation) [ ] Class IV (symptoms at rest)    PAST MEDICAL & SURGICAL HISTORY:  HIV (human immunodeficiency virus infection)  x 25 yrs      Hepatitis C, chronic  dx 2yr no tx per pt      Thrombocytopenia  2 yr plt ct low 32 high 150      History of surgery  ls laminectomy  97 98      History of surgery  rt elbow sx child  bone spur      History of surgery  lt hip i and d 24 yrs      Home Medications:  celecoxib 200 mg oral capsule: 1 cap(s) orally once a day (29 Jun 2022 11:51)  dapsone 100 mg oral tablet: orally 3 times a week m w f  (29 Jun 2022 11:51)  hyoscyamine 0.375 mg oral capsule, extended release: 1 tab(s) orally (29 Jun 2022 11:51)  lisinopril 30 mg oral tablet: 1 tab(s) orally once a day (29 Jun 2022 11:51)  Pepcid 40 mg oral tablet: 1 tab(s) orally once a day (at bedtime) (29 Jun 2022 11:51)  sulfaSALAzine 500 mg oral delayed release tablet: 2 tab(s) orally 2 times a day (29 Jun 2022 11:51)  Triumeq oral tablet: 1 tab(s) orally once a day 600/50/300 mg po takes after meal (29 Jun 2022 11:51)      Antiplatelet therapy:                           Last dose/amt:    Allergies    penicillins (Hives)    Intolerances        SOCIAL HISTORY:  Smoker: [ ] Yes  [x ] No        PACK YEARS:                         WHEN QUIT?  ETOH use: [ x] Yes  [ ] No              FREQUENCY / QUANTITY: drank alcohol fo 20 years but quit in 1994  Ilicit Drug use:  [x ] Yes  [ ] No multi drug abuse, including IV drugs for 20 years, quit in 1994  Occupation:   Lives with: alone  Assisted device use: none  5 meter walk test: 1____sec, 2____sec, 3___sec  FAMILY HISTORY:  HTN (hypertension) (Mother, Father)        Review of Systems  CONSTITUTIONAL:  Fevers[ ] chills[ ] sweats[ ] fatigue[ ] weight loss[ ] weight gain [ ]                                     NEGATIVE [X ]   NEURO:  parathesias[ ] seizures [ ]  syncope [ ]  confusion [ ]                                                                                NEGATIVE[ X]   EYES: glasses[ ]  blurry vision[ ]  discharge[ ] pain[ ] glaucoma [ ]                                                                          NEGATIVE[X ]   ENMT:  difficulty hearing [ ]  vertigo[ ]  dysphagia[ ] epistaxis[ ] recent dental work [ ]                                    NEGATIVE[ X]   CV:  chest pain[ ] palpitations[ ] SANDOVAL [ ] diaphoresis [ ]                                                                                           NEGATIVE[ X]   RESPIRATORY:  wheezing[ ] SOB[ ] cough [ ] sputum[ ] hemoptysis[ ]                                                                  NEGATIVE[ ]   GI:  nausea[ ]  vommiting [ ]  diarrhea[ ] constipation [ ] melena [ ]                                                                      NEGATIVE[ X]   : hematuria[ ]  dysuria[ ] urgency[ ] incontinence[ ]                                                                                            NEGATIVE[ X]   MUSKULOSKELETAL:  arthritis[ ]  joint swelling [ ] muscle weakness [ ] Hx vein stripping [ ]                             NEGATIVE[X ]   SKIN/BREAST:  rash[ ] itching [ ]  hair loss[ ] masses[ ]                                                                                              NEGATIVE[ X]   PSYCH:  dementia [ ] depresion [ ] anxiety[ ]                                                                                                               NEGATIVE[X ]   HEME/LYMPH:  bruises easily[ ] enlarged lymph nodes[ ] tender lymph nodes[ ]                                               NEGATIVE[ X]   ENDOCRINE:  cold intolerance[ ] heat intolerance[ ] polydipsia[ ]                                                                          NEGATIVE[ X]     PHYSICAL EXAM  Vital Signs Last 24 Hrs  HR: 86 (29 Jun 2022 12:00) (86 - 86)  BP: 156/78 (29 Jun 2022 12:00) (156/78 - 156/78)  BP(mean): 104 (29 Jun 2022 12:00) (104 - 104)  RR: 12 (29 Jun 2022 12:00) (12 - 12)  SpO2: 98% (29 Jun 2022 12:00) (98% - 98%)  Right arm bp: Left arm bp;    CONSTITUTIONAL:                                                                          WNL[x ]   Neuro: WNL[x ] Normal exam oriented to person/place & time with no focal motor or sensory  deficits. Other                     Eyes: WNL[ x]   Normal exam of conjunctiva & lids, pupils equally reactive. Other     ENT: WNL[ x]    Normal exam of nasal/oral mucosa with absence of cyanosis. Other  Neck: WNL[x ]  Normal exam of jugular veins, trachea & thyroid. Other  Chest: WNL[x ] Normal lung exam with good air movement absence of wheezes, rales, or rhonchi: Other                                                                                CV:  Auscultation: normal [x ] S3[ ] S4[ ] Irregular [ ] Rub[ ] Clicks[ ]    Murmurs none:[ x]systolic [ ]  diastolic [ ] holosystolic [ ]  Carotids: No Bruits[x ] Other                 Abdominal Aorta: normal [ ] nonpalpable[ ]Other                                                                                      GI:           WNL[x ] Normal exam of abdomen, liver & spleen with no noted masses or tenderness. Other                                                                                                        Extremities: WNL[ x] Normal no evidence of cyanosis or deformity Edema: none[ ]trace[ ]1+[ ]2+[ ]3+[ ]4+[ ]  Lower Extremity Pulses: Right[ ] Left[ ]Varicosities[ ]  SKIN :WNL[x ] Normal exam to inspection & palation. Other:                                                          LABS:                        12.2   6.71  )-----------( 162      ( 29 Jun 2022 11:40 )             36.1     06-29    138  |  101  |  19  ----------------------------<  129<H>  4.6   |  26  |  0.9    Ca    9.5      29 Jun 2022 11:40                  Cardiac Cath: LM: 90% distal LM stenosis    LAD: 70% diffuse stenosis of prox LAD, severe diffuse atherosclerosis of distal LAD  D1: severe atherosclerosis, small size  D2: medium sized, moderate atherosclerosis    LCX: 90% ostial stenosis, 99% distal LCX stenosis    Ramus: severe diffuse atherosclerosis    RCA: mild diffuse disease in prox and mid RCA, 60% stenosis of distal RCA  RPDA: 90% stenosis in distal segment  RPLS: 70% diffuse stenosis    TTE / ANNA:    Recommendation: (Procedures/Evaluations)  CT HEAD Nonn-Contrast:[  ]  CT Chest with /without contrast [ ]  Carotid Duplex :[ ]  JORGE/PVR: [ ]  PFT : Simple PFT [ ]  Full [ ]  Renal Consult [ ]  Pulmonary Consult: [ ]   Vascular Consult [ ]    Dental Consult [ ]   Hem-Onc Consult [ ]   GI Consult [ ]   Other Consultations :    STS Score:     Impression:    CAD [x ]  Valvular  disease [ ]   Aortic Disease [ ]   DIOR: Yes[ ] No [ ]   CKD Stage I [ ] , Stage II [ ] , Stage III [ ], Stage IV [ ]   Anemia: Yes [ ], No [ ]  Diabetes :Yes [ ], No [ ]  Acute MI: Yes [ ], No [ ]   Heart Failure: Yes [ ] , No [ ] HFpEF [ ], HFrEF [ ]        Assessment/ Plan:  61y Male with PMH of HIV, former drug abuser, ETOH abuse, RA, presented to his cardiologist for 2 years of chest pain on exertion, stress test negative, and normal EKG, had subsequent CTA which showed LM and 3vCAD. Cardiac cath showed LM 90%, 3vCAD. CT surgery consulted for CABG evaluation.   admit to [facility] for pre-op for CABG  OR date TBD   will get pre-op studies including echo, carotids, cxr, ekg and labs   discussed case with Dr. Reina

## 2022-06-29 NOTE — CHART NOTE - NSCHARTNOTEFT_GEN_A_CORE
PRE-OP DIAGNOSIS:    stable angina, abnormal ccta, abnormal ct-ffr    PROCEDURE:     [x] Coronary Angiogram     [x] LHC     [] LVG     [] RHC     [] Intervention (see below)         PHYSICIAN:  Dr. Lacey    ASSISTANT:  Dr. Davey       PROCEDURE DESCRIPTION:     Consent:      [x] Patient     [] Family Member     []  Used        Anesthesia:     [] General     [x] Sedation     [x] Local        Access & Closure:     [x] 6 Fr right Radial Artery (TR band)    [] Fr Femoral Artery     [] Fr Femoral Vein     [] Fr Brachial Vein       IV Contrast: 50 mL        Intervention: none    Implants: none        FINDINGS:     Coronary Dominance: right dominant      LM: 90% distal LM stenosis    LAD: 70% diffuse stenosis of prox LAD, severe diffuse atherosclerosis of distal LAD  D1: severe atherosclerosis, small size  D2: medium sized, moderate atherosclerosis    LCX: 90% ostial stenosis, 99% distal LCX stenosis    Ramus: severe diffuse atherosclerosis    RCA: mild diffuse disease in prox and mid RCA, 60% stenosis of distal RCA  RPDA: 90% stenosis in distal segment  RPLS: 70% diffuse stenosis         LVEDP: 17 mmHg        ESTIMATED BLOOD LOSS: < 10 mL        CONDITION:     [x] Good     [] Fair     [] Critical        SPECIMEN REMOVED: N/A       POST-OP DIAGNOSIS:      [] Normal Coronary Angiogram     [] Mild Coronary Artery Disease (< 50% stenosis)     [x] Severe LM and 3 Vessel Coronary Artery Disease (prox LAD/distal LAD, ostial / distal LCX, RPDA / RPL, and Ramus), SYNTAX Score I of 42       PLAN OF CARE:     [x] CT Surgery Consult for evaluation for CABG    [x] Disposition to be decided after evaluation by CT surgery if plan for inpatient CABG    [x] Medications:   - start ASA 81mg, and statin    [x] IV Fluids: NS@150cc/hr x 3 hours

## 2022-06-30 LAB
ABO RH CONFIRMATION: SIGNIFICANT CHANGE UP
ANION GAP SERPL CALC-SCNC: 12 MMOL/L — SIGNIFICANT CHANGE UP (ref 7–14)
APTT BLD: 31.3 SEC — SIGNIFICANT CHANGE UP (ref 27–39.2)
APTT BLD: 31.4 SEC — SIGNIFICANT CHANGE UP (ref 27–39.2)
APTT BLD: 32.6 SEC — SIGNIFICANT CHANGE UP (ref 27–39.2)
BLD GP AB SCN SERPL QL: SIGNIFICANT CHANGE UP
BUN SERPL-MCNC: 13 MG/DL — SIGNIFICANT CHANGE UP (ref 10–20)
CALCIUM SERPL-MCNC: 9.4 MG/DL — SIGNIFICANT CHANGE UP (ref 8.5–10.1)
CHLORIDE SERPL-SCNC: 101 MMOL/L — SIGNIFICANT CHANGE UP (ref 98–110)
CO2 SERPL-SCNC: 27 MMOL/L — SIGNIFICANT CHANGE UP (ref 17–32)
CREAT SERPL-MCNC: 0.8 MG/DL — SIGNIFICANT CHANGE UP (ref 0.7–1.5)
EGFR: 102 ML/MIN/1.73M2 — SIGNIFICANT CHANGE UP
GAS PNL BLDA: SIGNIFICANT CHANGE UP
GLUCOSE SERPL-MCNC: 132 MG/DL — HIGH (ref 70–99)
HCT VFR BLD CALC: 33.4 % — LOW (ref 42–52)
HGB BLD-MCNC: 11.2 G/DL — LOW (ref 14–18)
MCHC RBC-ENTMCNC: 28.6 PG — SIGNIFICANT CHANGE UP (ref 27–31)
MCHC RBC-ENTMCNC: 33.5 G/DL — SIGNIFICANT CHANGE UP (ref 32–37)
MCV RBC AUTO: 85.4 FL — SIGNIFICANT CHANGE UP (ref 80–94)
MRSA PCR RESULT.: SIGNIFICANT CHANGE UP
NRBC # BLD: 0 /100 WBCS — SIGNIFICANT CHANGE UP (ref 0–0)
NT-PROBNP SERPL-SCNC: 566 PG/ML — HIGH (ref 0–300)
PLATELET # BLD AUTO: 175 K/UL — SIGNIFICANT CHANGE UP (ref 130–400)
POTASSIUM SERPL-MCNC: 4.5 MMOL/L — SIGNIFICANT CHANGE UP (ref 3.5–5)
POTASSIUM SERPL-SCNC: 4.5 MMOL/L — SIGNIFICANT CHANGE UP (ref 3.5–5)
RBC # BLD: 3.91 M/UL — LOW (ref 4.7–6.1)
RBC # FLD: 15.7 % — HIGH (ref 11.5–14.5)
S AUREUS DNA NOSE QL NAA+PROBE: SIGNIFICANT CHANGE UP
SARS-COV-2 RNA SPEC QL NAA+PROBE: SIGNIFICANT CHANGE UP
SODIUM SERPL-SCNC: 140 MMOL/L — SIGNIFICANT CHANGE UP (ref 135–146)
WBC # BLD: 6.94 K/UL — SIGNIFICANT CHANGE UP (ref 4.8–10.8)
WBC # FLD AUTO: 6.94 K/UL — SIGNIFICANT CHANGE UP (ref 4.8–10.8)

## 2022-06-30 PROCEDURE — 93010 ELECTROCARDIOGRAM REPORT: CPT

## 2022-06-30 PROCEDURE — 71045 X-RAY EXAM CHEST 1 VIEW: CPT | Mod: 26

## 2022-06-30 RX ORDER — CHLORHEXIDINE GLUCONATE 213 G/1000ML
15 SOLUTION TOPICAL ONCE
Refills: 0 | Status: COMPLETED | OUTPATIENT
Start: 2022-06-30 | End: 2022-06-30

## 2022-06-30 RX ORDER — CHLORHEXIDINE GLUCONATE 213 G/1000ML
1 SOLUTION TOPICAL ONCE
Refills: 0 | Status: COMPLETED | OUTPATIENT
Start: 2022-06-30 | End: 2022-06-30

## 2022-06-30 RX ORDER — HEPARIN SODIUM 5000 [USP'U]/ML
2000 INJECTION INTRAVENOUS; SUBCUTANEOUS ONCE
Refills: 0 | Status: COMPLETED | OUTPATIENT
Start: 2022-06-30 | End: 2022-06-30

## 2022-06-30 RX ORDER — HEPARIN SODIUM 5000 [USP'U]/ML
1200 INJECTION INTRAVENOUS; SUBCUTANEOUS
Qty: 25000 | Refills: 0 | Status: DISCONTINUED | OUTPATIENT
Start: 2022-06-30 | End: 2022-07-01

## 2022-06-30 RX ORDER — SULFASALAZINE 500 MG
1500 TABLET ORAL
Refills: 0 | Status: DISCONTINUED | OUTPATIENT
Start: 2022-06-30 | End: 2022-07-06

## 2022-06-30 RX ORDER — METOPROLOL TARTRATE 50 MG
12.5 TABLET ORAL ONCE
Refills: 0 | Status: COMPLETED | OUTPATIENT
Start: 2022-06-30 | End: 2022-06-30

## 2022-06-30 RX ORDER — HEPARIN SODIUM 5000 [USP'U]/ML
1100 INJECTION INTRAVENOUS; SUBCUTANEOUS
Qty: 25000 | Refills: 0 | Status: DISCONTINUED | OUTPATIENT
Start: 2022-06-30 | End: 2022-06-30

## 2022-06-30 RX ORDER — DAPSONE 100 MG/1
100 TABLET ORAL EVERY 24 HOURS
Refills: 0 | Status: DISCONTINUED | OUTPATIENT
Start: 2022-06-30 | End: 2022-07-02

## 2022-06-30 RX ADMIN — SODIUM CHLORIDE 3 MILLILITER(S): 9 INJECTION INTRAMUSCULAR; INTRAVENOUS; SUBCUTANEOUS at 22:00

## 2022-06-30 RX ADMIN — Medication 81 MILLIGRAM(S): at 14:32

## 2022-06-30 RX ADMIN — Medication 1500 MILLIGRAM(S): at 18:31

## 2022-06-30 RX ADMIN — HEPARIN SODIUM 2000 UNIT(S): 5000 INJECTION INTRAVENOUS; SUBCUTANEOUS at 18:40

## 2022-06-30 RX ADMIN — FAMOTIDINE 20 MILLIGRAM(S): 10 INJECTION INTRAVENOUS at 18:30

## 2022-06-30 RX ADMIN — CHLORHEXIDINE GLUCONATE 1 APPLICATION(S): 213 SOLUTION TOPICAL at 20:57

## 2022-06-30 RX ADMIN — Medication 1000 MILLIGRAM(S): at 05:10

## 2022-06-30 RX ADMIN — DAPSONE 100 MILLIGRAM(S): 100 TABLET ORAL at 18:30

## 2022-06-30 RX ADMIN — Medication 12.5 MILLIGRAM(S): at 18:31

## 2022-06-30 RX ADMIN — Medication 12.5 MILLIGRAM(S): at 05:10

## 2022-06-30 RX ADMIN — ATORVASTATIN CALCIUM 40 MILLIGRAM(S): 80 TABLET, FILM COATED ORAL at 21:31

## 2022-06-30 RX ADMIN — Medication 12.5 MILLIGRAM(S): at 21:31

## 2022-06-30 RX ADMIN — FAMOTIDINE 20 MILLIGRAM(S): 10 INJECTION INTRAVENOUS at 05:10

## 2022-06-30 RX ADMIN — SODIUM CHLORIDE 3 MILLILITER(S): 9 INJECTION INTRAMUSCULAR; INTRAVENOUS; SUBCUTANEOUS at 14:29

## 2022-06-30 NOTE — PROGRESS NOTE ADULT - SUBJECTIVE AND OBJECTIVE BOX
OPERATIVE PROCEDURE(s):                POD #                       [id number]  SURGEON(s): TEOFILO Reina  SUBJECTIVE ASSESSMENT:   Vital Signs Last 24 Hrs  T(F): 97.6 (2022 07:50), Max: 98.1 (2022 17:30)  HR: 76 (2022 08:00) (56 - 86)  BP: 129/60 (2022 08:00) (104/71 - 199/95)  BP(mean): 86 (2022 08:00) (74 - 139)  RR: 22 (2022 08:00) (12 - 36)  SpO2: 93% (2022 08:00) (92% - 99%)      I&O's Detail    2022 07:01  -  2022 07:00  --------------------------------------------------------  IN:    Heparin: 104 mL    Nitroglycerin: 275 mL    Oral Fluid: 180 mL  Total IN: 559 mL    OUT:    Voided (mL): 1000 mL  Total OUT: 1000 mL        Net:   I&O's Detail    2022 07:01  -  2022 07:00  --------------------------------------------------------  Total NET: -441 mL        CAPILLARY BLOOD GLUCOSE        Physical Exam:  General: NAD; A&Ox3/Patient is intubated and sedated  Cardiac: S1/S2, RRR, no murmur, no rubs  Lungs: unlabored respirations, CTA b/l, no wheeze, no rales, no crackles  Abdomen: Soft/NT/ND; positive bowel sounds x 4  Sternum: Intact, no click, incision healing well with no drainage  Incisions: Incisions clean/dry/intact  Extremities: No edema b/l lower extremities; good capillary refill; no cyanosis; palpable 1+ pedal pulses b/l    Central Venous Catheter: Yes[]  No[] , If Yes indication:           Day #  Tolliver Catheter: Yes  [] , No  [] , If yes indication:                      Day #  NGT: Yes [] No [] ,    If Yes Placement:                                     Day #  EPICARDIAL WIRES:  [] YES [] NO                                              Day #  BOWEL MOVEMENT:  [] YES [] NO, If No, Timing since last BM:      Day #  CHEST TUBE(Left/Right):  [] YES [] NO, If yes -  AIR LEAKS:  [] YES [] NO        LABS:                        11.0<L>  5.10  )-----------( 152      ( 2022 14:50 )             32.6<L>                        12.2<L>  6.71  )-----------( 162      ( 2022 11:40 )             36.1<L>        138  |  102  |  15  ----------------------------<  115<H>  3.8   |  24  |  0.7      138  |  101  |  19  ----------------------------<  129<H>  4.6   |  26  |  0.9    Ca    8.7      2022 14:50    TPro  6.7 [6.0 - 8.0]  /  Alb  4.5 [3.5 - 5.2]  /  TBili  0.3 [0.2 - 1.2]  /  DBili  x   /  AST  15 [0 - 41]  /  ALT  15 [0 - 41]  /  AlkPhos  106 [30 - 115]  -    PT/INR - ( 2022 14:50 )   PT: ;   INR: 1.15 ratio         PTT - ( 2022 01:35 )  PTT:31.4 sec, PTT - ( 2022 18:22 )  PTT:28.0 sec  Urinalysis Basic - ( 2022 22:36 )    Color: Yellow / Appearance: Clear / S.023 / pH: x  Gluc: x / Ketone: Negative  / Bili: Negative / Urobili: <2 mg/dL   Blood: x / Protein: Negative / Nitrite: Negative   Leuk Esterase: Negative / RBC: x / WBC x   Sq Epi: x / Non Sq Epi: x / Bacteria: x        RADIOLOGY & ADDITIONAL TESTS:  CXR:  EKG:  MEDICATIONS  (STANDING):  abacavir 600 mG/dolutegravir 50 mG/lamivudine 300 mG 1 Tablet(s) Oral daily  aspirin enteric coated 81 milliGRAM(s) Oral daily  atorvastatin 40 milliGRAM(s) Oral at bedtime  celecoxib 200 milliGRAM(s) Oral daily  dapsone 100 milliGRAM(s) Oral <User Schedule>  famotidine    Tablet 20 milliGRAM(s) Oral two times a day  heparin  Infusion 800 Unit(s)/Hr (10 mL/Hr) IV Continuous <Continuous>  metoprolol tartrate 12.5 milliGRAM(s) Oral every 12 hours  nitroglycerin  Infusion 16.667 MICROgram(s)/Min (5 mL/Hr) IV Continuous <Continuous>  sodium chloride 0.9% lock flush 3 milliLiter(s) IV Push every 8 hours  sulfaSALAzine 1000 milliGRAM(s) Oral every 12 hours    MEDICATIONS  (PRN):    HEPARIN:  [] YES [] NO  Dose: XX UNITS/HR UNITS Q8H  LOVENOX:[] YES [] NO  Dose: XX mg Q24H  COUMADIN: []  YES [] NO  Dose: XX mg  Q24H  SCD's: YES b/l  GI Prophylaxis: Protonix [], Pepcid [], None [], (Contra-indication:.....)    Post-Op Beta-Blockers: Yes [], No[], If No, then contraindication:  Post-Op Aspirin: Yes [],  No [], If No, then contraindication:  Post-Op Statin: Yes [], No[], If No, then contraindication:  Allergies    penicillins (Hives)    Intolerances      Ambulation/Activity Status:    Assessment/Plan:  61y Male status-post .....  - Case and plan discussed with CTU Intensivist and CT Surgeon - Dr. Reina/Bruce/Karen  - Continue CTU supportive care    - Continue DVT/GI prophylaxis  - Incentive Spirometry 10 times an hour  - Continue to advance physical activity as tolerated and continue PT/OT as directed  1. CAD: Continue ASA, statin, BB  2. HTN:   3. A. Fib:   4. COPD/Hypoxia:   5. DM/Glucose Control:     Social Service Disposition:     Cardiac Surgery Pre-op Note:  CC: Patient is a 61y old  Male who presents with a chief complaint of     Referring Physician:  Dr. Woo                                                                                           Surgeon:  Dr. Barrera  Procedure: (Date) (Procedure)   2022   Coronary Artery Bypass Grafting     Allergies    penicillins (Hives)    Intolerances      HPI:      PAST MEDICAL & SURGICAL HISTORY:  HIV (human immunodeficiency virus infection)  x 25 yrs      Hepatitis C, chronic  dx 2yr no tx per pt      Thrombocytopenia  2 yr plt ct low 32 high 150      History of surgery  ls laminectomy  97 98      History of surgery  rt elbow sx child  bone spur      History of surgery  lt hip i and d 24 yrs      MEDICATIONS  (STANDING):  abacavir 600 mG/dolutegravir 50 mG/lamivudine 300 mG 1 Tablet(s) Oral daily  aspirin enteric coated 81 milliGRAM(s) Oral daily  atorvastatin 40 milliGRAM(s) Oral at bedtime  dapsone 100 milliGRAM(s) Oral <User Schedule>  famotidine    Tablet 20 milliGRAM(s) Oral two times a day  heparin  Infusion 1100 Unit(s)/Hr (11 mL/Hr) IV Continuous <Continuous>  metoprolol tartrate 12.5 milliGRAM(s) Oral every 12 hours  nitroglycerin  Infusion 16.667 MICROgram(s)/Min (5 mL/Hr) IV Continuous <Continuous>  sodium chloride 0.9% lock flush 3 milliLiter(s) IV Push every 8 hours  sulfaSALAzine 1000 milliGRAM(s) Oral every 12 hours    MEDICATIONS  (PRN):      Labs:                        11.0   5.10  )-----------( 152      ( 2022 14:50 )             32.6         138  |  102  |  15  ----------------------------<  115<H>  3.8   |  24  |  0.7    Ca    8.7      2022 14:50    TPro  6.7  /  Alb  4.5  /  TBili  0.3  /  DBili  x   /  AST  15  /  ALT  15  /  AlkPhos  106  -    PT/INR - ( 2022 14:50 )   PT: 13.20 sec;   INR: 1.15 ratio         PTT - ( 2022 09:10 )  PTT:32.6 sec  Covid:     Blood Type:   HGB A1C: 4.7  Prealbumin:   Pro-BNP: Serum Pro-Brain Natriuretic Peptide: 566 pg/mL ( @ 04:40)    Thyroid Panel:  @ 14:50/4.22  --/    MRSA: MRSA PCR Result.: NotDetec ( @ 18:22)   / MSSA:   Urinalysis Basic - ( 2022 22:36 )    Color: Yellow / Appearance: Clear / S.023 / pH: x  Gluc: x / Ketone: Negative  / Bili: Negative / Urobili: <2 mg/dL   Blood: x / Protein: Negative / Nitrite: Negative   Leuk Esterase: Negative / RBC: x / WBC x   Sq Epi: x / Non Sq Epi: x / Bacteria: x        CXR:      EKG:     Carotid Duplex:   < from: [facility] Duplex Carotid, Bilat (22 @ 18:04) >  IMPRESSION: Mild 20-39% stenosis in bilateral internal carotid artery    < end of copied text >        Echocardiogram:         Cardiac catheterization: LM: 90% distal LM stenosis    LAD: 70% diffuse stenosis of prox LAD, severe diffuse atherosclerosis of distal LAD  D1: severe atherosclerosis, small size  D2: medium sized, moderate atherosclerosis    LCX: 90% ostial stenosis, 99% distal LCX stenosis    Ramus: severe diffuse atherosclerosis    RCA: mild diffuse disease in prox and mid RCA, 60% stenosis of distal RCA  RPDA: 90% stenosis in distal segment  RPLS: 70% diffuse stenosis      Vital Signs Last 24 Hrs  T(C): 36.4 (2022 07:50), Max: 36.7 (2022 17:30)  T(F): 97.6 (2022 07:50), Max: 98.1 (2022 17:30)  HR: 71 (2022 10:00) (56 - 86)  BP: 141/63 (2022 10:00) (104/71 - 199/95)  BP(mean): 91 (2022 10:00) (74 - 139)  RR: 24 (2022 10:00) (12 - 36)  SpO2: 94% (2022 10:00) (92% - 99%)  right arm bp:  left arm bp:    5 meter:     Gen: WN/WD NAD  Neuro: A&Ox3, nonfocal  Pulm: CTA B/L  CV: RRR, S1S2  Abd: Soft, NT, ND +BS  Ext: No edema, + peripheral pulses    Cardic Surgery Risk Factors  CVA and/or carotid/cerebrovascular disease. Yes  No  Explain if Yes  Aortoiliac disease Yes  No  Explain if Yes  Previous MI Yes  No  Explain if Yes  Previos Cardiac Surgery Yes  No  Explain if Yes  Hemodynamics-Unstable or Shock Yes  No  Explain if Yes  Diabetis Yes  No  Explain if Yes  Hepatic Failure Yes  No  Explain if Yes  Renal failure and/or dialysis Yes  No  Explain if Yes  Heart failure-type-present or past Yes  No  Explain if Yes  COPD Yes  No  Explain if Yes  Immune System Deficiency Yes  No  Explain if Yes  Malignant Ventricular Arrhythmia Yes  No  Explain if Yes    STS Score:     Pt has AICD/PPM [ ] Yes  [x ] No             Brand Name:  Pre-op Beta Blocker ordered within 24 hrs of surgery (CABG ONLY)?  [x ] Yes  [ ] No  If not, Why?  Type & Cross  [ ] Yes  [ ] No  NPO after Midnight [x ] Yes  [ ] No  Pre-op ABX ordered, to be taped on chart:  [ x] Yes  [ ] No     Hibiclens/Peridex ordered [x ] Yes  [ ] No  Intraop on Hold: PRBCs, CXR, ANNA [x ]   Consent obtained  [x ] Yes  [ ] No                 Cardiac Surgery Pre-op Note:  CC: Patient is a 61y old  Male who presents with a chief complaint of     Referring Physician:  Dr. Woo                                                                                           Surgeon:  Dr. Barrera  Procedure: (Date) (Procedure)   2022   Coronary Artery Bypass Grafting     Allergies    penicillins (Hives)    Intolerances      HPI: 61y Male with PMH of HIV, former drug abuser, ETOH abuse, RA, presented to his cardiologist for 2 years of chest pain on exertion, stress test negative, and normal EKG, had subsequent CTA which showed LM and 3vCAD. Cardiac cath showed LM 90%, 3vCAD. CT surgery consulted for CABG evaluation.       PAST MEDICAL & SURGICAL HISTORY:  HIV (human immunodeficiency virus infection)  x 25 yrs      Hepatitis C, chronic  dx 2yr no tx per pt      Thrombocytopenia  2 yr plt ct low 32 high 150      History of surgery  ls laminectomy  97 98      History of surgery  rt elbow sx child  bone spur      History of surgery  lt hip i and d 24 yrs      MEDICATIONS  (STANDING):  abacavir 600 mG/dolutegravir 50 mG/lamivudine 300 mG 1 Tablet(s) Oral daily  aspirin enteric coated 81 milliGRAM(s) Oral daily  atorvastatin 40 milliGRAM(s) Oral at bedtime  dapsone 100 milliGRAM(s) Oral <User Schedule>  famotidine    Tablet 20 milliGRAM(s) Oral two times a day  heparin  Infusion 1100 Unit(s)/Hr (11 mL/Hr) IV Continuous <Continuous>  metoprolol tartrate 12.5 milliGRAM(s) Oral every 12 hours  nitroglycerin  Infusion 16.667 MICROgram(s)/Min (5 mL/Hr) IV Continuous <Continuous>  sodium chloride 0.9% lock flush 3 milliLiter(s) IV Push every 8 hours  sulfaSALAzine 1000 milliGRAM(s) Oral every 12 hours    MEDICATIONS  (PRN):      Labs:                        11.0   5.10  )-----------( 152      ( 2022 14:50 )             32.6     -    138  |  102  |  15  ----------------------------<  115<H>  3.8   |  24  |  0.7    Ca    8.7      2022 14:50    TPro  6.7  /  Alb  4.5  /  TBili  0.3  /  DBili  x   /  AST  15  /  ALT  15  /  AlkPhos  106      PT/INR - ( 2022 14:50 )   PT: 13.20 sec;   INR: 1.15 ratio         PTT - ( 2022 09:10 )  PTT:32.6 sec  Covid:     Blood Type:   HGB A1C: 4.7  Prealbumin:   Pro-BNP: Serum Pro-Brain Natriuretic Peptide: 566 pg/mL ( @ 04:40)    Thyroid Panel:  @ 14:50/4.22  --/6.    MRSA: MRSA PCR Result.: NotDetec ( @ 18:22)   / MSSA:   Urinalysis Basic - ( 2022 22:36 )    Color: Yellow / Appearance: Clear / S.023 / pH: x  Gluc: x / Ketone: Negative  / Bili: Negative / Urobili: <2 mg/dL   Blood: x / Protein: Negative / Nitrite: Negative   Leuk Esterase: Negative / RBC: x / WBC x   Sq Epi: x / Non Sq Epi: x / Bacteria: x        CXR:  < from: Xray Chest 1 View- PORTABLE-Urgent (Xray Chest 1 View- PORTABLE-Urgent .) (22 @ 10:34) >  Impression:    No radiographic evidence of acute cardiopulmonary disease.    < end of copied text >      EKG:  < from: 12 Lead ECG (22 @ 11:27) >    Ventricular Rate 72 BPM    Atrial Rate 72 BPM    P-R Interval 166 ms    QRS Duration 94 ms    Q-T Interval 442 ms    QTC Calculation(Bazett) 483 ms    P Axis 45 degrees    R Axis 18 degrees    T Axis 85 degrees    Diagnosis Line Normal sinus rhythm  Incomplete right bundle branch block  Prolonged QT  Abnormal ECG    < end of copied text >      Carotid Duplex:   < from: [facility] Duplex Carotid, Bilat (22 @ 18:04) >  IMPRESSION: Mild 20-39% stenosis in bilateral internal carotid artery    < end of copied text >        Echocardiogram: EF 50-55, mild TR        Cardiac catheterization: LM: 90% distal LM stenosis    LAD: 70% diffuse stenosis of prox LAD, severe diffuse atherosclerosis of distal LAD  D1: severe atherosclerosis, small size  D2: medium sized, moderate atherosclerosis    LCX: 90% ostial stenosis, 99% distal LCX stenosis    Ramus: severe diffuse atherosclerosis    RCA: mild diffuse disease in prox and mid RCA, 60% stenosis of distal RCA  RPDA: 90% stenosis in distal segment  RPLS: 70% diffuse stenosis      Vital Signs Last 24 Hrs  T(C): 36.4 (2022 07:50), Max: 36.7 (2022 17:30)  T(F): 97.6 (2022 07:50), Max: 98.1 (2022 17:30)  HR: 71 (2022 10:00) (56 - 86)  BP: 141/63 (2022 10:00) (104/71 - 199/95)  BP(mean): 91 (2022 10:00) (74 - 139)  RR: 24 (2022 10:00) (12 - 36)  SpO2: 94% (2022 10:00) (92% - 99%)  right arm bp: 138/71  left arm bp: 149/75    5 meter: 7; 6; 6    Gen: WN/WD NAD  Neuro: A&Ox3, nonfocal  Pulm: CTA B/L  CV: RRR, S1S2  Abd: Soft, NT, ND +BS  Ext: No edema, + peripheral pulses    Cardic Surgery Risk Factors  CVA and/or carotid/cerebrovascular disease. Yes  No  Explain if Yes  Aortoiliac disease Yes  No  Explain if Yes  Previous MI Yes  No  Explain if Yes  Previous Cardiac Surgery Yes  No  Explain if Yes  Hemodynamics-Unstable or Shock Yes  No  Explain if Yes  Diabetes Yes  No  Explain if Yes  Hepatic Failure Yes  No  Explain if Yes  Renal failure and/or dialysis Yes  No  Explain if Yes  Heart failure-type-present or past Yes  No  Explain if Yes  COPD Yes  No  Explain if Yes  Immune System Deficiency Yes  No  Explain if Yes HIV +  Malignant Ventricular Arrhythmia Yes  No  Explain if Yes    STS Score: Risk of Mortality:  0.452%  Renal Failure:  0.632%  Permanent Stroke:  0.453%  Prolonged Ventilation:  2.169%  DSW Infection:  0.199%  Reoperation:  1.530%  Morbidity or Mortality:  3.932%  Short Length of Stay:  69.550%  Long Length of Stay:  1.398%    Pt has AICD/PPM [ ] Yes  [x ] No             Brand Name:  Pre-op Beta Blocker ordered within 24 hrs of surgery (CABG ONLY)?  [x ] Yes  [ ] No  If not, Why?  Type & Cross  [ ] Yes  [ ] No  NPO after Midnight [x ] Yes  [ ] No  Pre-op ABX ordered, to be taped on chart:  [ x] Yes  [ ] No     Hibiclens/Peridex ordered [x ] Yes  [ ] No  Intraop on Hold: PRBCs, CXR, ANNA [x ]   Consent obtained  [x ] Yes  [ ] No

## 2022-06-30 NOTE — PRE-ANESTHESIA EVALUATION ADULT - NSRADCARDRESULTSFT_GEN_ALL_CORE
Carotid Duplex IMPRESSION: Mild 20-39% stenosis in bilateral internal carotid artery    Cardiac catheterization: LM: 90% distal LM stenosis  LAD: 70% diffuse stenosis of prox LAD, severe diffuse atherosclerosis of distal LAD  D1: severe atherosclerosis, small size  D2: medium sized, moderate atherosclerosis  LCX: 90% ostial stenosis, 99% distal LCX stenosis  Ramus: severe diffuse atherosclerosis  RCA: mild diffuse disease in prox and mid RCA, 60% stenosis of distal RCA  RPDA: 90% stenosis in distal segment  RPLS: 70% diffuse stenosis

## 2022-07-01 LAB
4/8 RATIO: 0.36 RATIO — LOW (ref 1.2–3.4)
ABS CD8: 765 /UL — HIGH (ref 206–494)
ALBUMIN SERPL ELPH-MCNC: 4.2 G/DL — SIGNIFICANT CHANGE UP (ref 3.5–5.2)
ALP SERPL-CCNC: 62 U/L — SIGNIFICANT CHANGE UP (ref 30–115)
ALT FLD-CCNC: 8 U/L — SIGNIFICANT CHANGE UP (ref 0–41)
ANION GAP SERPL CALC-SCNC: 12 MMOL/L — SIGNIFICANT CHANGE UP (ref 7–14)
APTT BLD: 25.7 SEC — LOW (ref 27–39.2)
AST SERPL-CCNC: 18 U/L — SIGNIFICANT CHANGE UP (ref 0–41)
BASOPHILS # BLD AUTO: 0.03 K/UL — SIGNIFICANT CHANGE UP (ref 0–0.2)
BASOPHILS NFR BLD AUTO: 0.2 % — SIGNIFICANT CHANGE UP (ref 0–1)
BILIRUB SERPL-MCNC: 0.6 MG/DL — SIGNIFICANT CHANGE UP (ref 0.2–1.2)
BUN SERPL-MCNC: 10 MG/DL — SIGNIFICANT CHANGE UP (ref 10–20)
CALCIUM SERPL-MCNC: 8.5 MG/DL — SIGNIFICANT CHANGE UP (ref 8.5–10.1)
CD16+CD56+ CELLS NFR BLD: 6 % — SIGNIFICANT CHANGE UP (ref 4–20)
CD16+CD56+ CELLS NFR SPEC: 94 /UL — SIGNIFICANT CHANGE UP (ref 89–385)
CD19 BLASTS SPEC-ACNC: 24 % — SIGNIFICANT CHANGE UP (ref 10–28)
CD19 BLASTS SPEC-ACNC: 363 /UL — SIGNIFICANT CHANGE UP (ref 72–502)
CD3 BLASTS SPEC-ACNC: 1043 /UL — SIGNIFICANT CHANGE UP (ref 800–2012)
CD3 BLASTS SPEC-ACNC: 69 % — SIGNIFICANT CHANGE UP (ref 59–81)
CD4 %: 18 % — LOW (ref 42–58)
CD8 %: 51 % — HIGH (ref 14–30)
CHLORIDE SERPL-SCNC: 105 MMOL/L — SIGNIFICANT CHANGE UP (ref 98–110)
CO2 SERPL-SCNC: 23 MMOL/L — SIGNIFICANT CHANGE UP (ref 17–32)
CREAT SERPL-MCNC: 0.7 MG/DL — SIGNIFICANT CHANGE UP (ref 0.7–1.5)
EGFR: 106 ML/MIN/1.73M2 — SIGNIFICANT CHANGE UP
EOSINOPHIL # BLD AUTO: 0.04 K/UL — SIGNIFICANT CHANGE UP (ref 0–0.7)
EOSINOPHIL NFR BLD AUTO: 0.2 % — SIGNIFICANT CHANGE UP (ref 0–8)
GAS PNL BLDA: SIGNIFICANT CHANGE UP
GAS PNL BLDA: SIGNIFICANT CHANGE UP
GLUCOSE BLDC GLUCOMTR-MCNC: 120 MG/DL — HIGH (ref 70–99)
GLUCOSE BLDC GLUCOMTR-MCNC: 122 MG/DL — HIGH (ref 70–99)
GLUCOSE BLDC GLUCOMTR-MCNC: 135 MG/DL — HIGH (ref 70–99)
GLUCOSE BLDC GLUCOMTR-MCNC: 159 MG/DL — HIGH (ref 70–99)
GLUCOSE BLDC GLUCOMTR-MCNC: 175 MG/DL — HIGH (ref 70–99)
GLUCOSE BLDC GLUCOMTR-MCNC: 202 MG/DL — HIGH (ref 70–99)
GLUCOSE SERPL-MCNC: 159 MG/DL — HIGH (ref 70–99)
HCT VFR BLD CALC: 24.8 % — LOW (ref 42–52)
HGB BLD-MCNC: 8.4 G/DL — LOW (ref 14–18)
IMM GRANULOCYTES NFR BLD AUTO: 0.7 % — HIGH (ref 0.1–0.3)
INR BLD: 1.21 RATIO — SIGNIFICANT CHANGE UP (ref 0.65–1.3)
LYMPHOCYTES # BLD AUTO: 1.26 K/UL — SIGNIFICANT CHANGE UP (ref 1.2–3.4)
LYMPHOCYTES # BLD AUTO: 7.7 % — LOW (ref 20.5–51.1)
MAGNESIUM SERPL-MCNC: 2.8 MG/DL — HIGH (ref 1.8–2.4)
MCHC RBC-ENTMCNC: 28.6 PG — SIGNIFICANT CHANGE UP (ref 27–31)
MCHC RBC-ENTMCNC: 33.9 G/DL — SIGNIFICANT CHANGE UP (ref 32–37)
MCV RBC AUTO: 84.4 FL — SIGNIFICANT CHANGE UP (ref 80–94)
MONOCYTES # BLD AUTO: 1.7 K/UL — HIGH (ref 0.1–0.6)
MONOCYTES NFR BLD AUTO: 10.4 % — HIGH (ref 1.7–9.3)
NEUTROPHILS # BLD AUTO: 13.13 K/UL — HIGH (ref 1.4–6.5)
NEUTROPHILS NFR BLD AUTO: 80.8 % — HIGH (ref 42.2–75.2)
NRBC # BLD: 0 /100 WBCS — SIGNIFICANT CHANGE UP (ref 0–0)
PLATELET # BLD AUTO: 141 K/UL — SIGNIFICANT CHANGE UP (ref 130–400)
POTASSIUM SERPL-MCNC: 4.5 MMOL/L — SIGNIFICANT CHANGE UP (ref 3.5–5)
POTASSIUM SERPL-SCNC: 4.5 MMOL/L — SIGNIFICANT CHANGE UP (ref 3.5–5)
PROT SERPL-MCNC: 5.5 G/DL — LOW (ref 6–8)
PROTHROM AB SERPL-ACNC: 13.9 SEC — HIGH (ref 9.95–12.87)
RBC # BLD: 2.94 M/UL — LOW (ref 4.7–6.1)
RBC # FLD: 15.5 % — HIGH (ref 11.5–14.5)
SODIUM SERPL-SCNC: 140 MMOL/L — SIGNIFICANT CHANGE UP (ref 135–146)
T-CELL CD4 SUBSET PNL BLD: 274 /UL — LOW (ref 495–1312)
WBC # BLD: 16.27 K/UL — HIGH (ref 4.8–10.8)
WBC # FLD AUTO: 16.27 K/UL — HIGH (ref 4.8–10.8)

## 2022-07-01 PROCEDURE — 33533 CABG ARTERIAL SINGLE: CPT | Mod: AS

## 2022-07-01 PROCEDURE — 33508 ENDOSCOPIC VEIN HARVEST: CPT | Mod: 59

## 2022-07-01 PROCEDURE — 71045 X-RAY EXAM CHEST 1 VIEW: CPT | Mod: 26

## 2022-07-01 PROCEDURE — 33519 CABG ARTERY-VEIN THREE: CPT

## 2022-07-01 PROCEDURE — 93010 ELECTROCARDIOGRAM REPORT: CPT

## 2022-07-01 PROCEDURE — 33519 CABG ARTERY-VEIN THREE: CPT | Mod: AS

## 2022-07-01 PROCEDURE — 33508 ENDOSCOPIC VEIN HARVEST: CPT | Mod: AS,59

## 2022-07-01 PROCEDURE — 33533 CABG ARTERIAL SINGLE: CPT

## 2022-07-01 RX ORDER — VASOPRESSIN 20 [USP'U]/ML
0.04 INJECTION INTRAVENOUS
Qty: 50 | Refills: 0 | Status: DISCONTINUED | OUTPATIENT
Start: 2022-07-01 | End: 2022-07-02

## 2022-07-01 RX ORDER — CHLORHEXIDINE GLUCONATE 213 G/1000ML
5 SOLUTION TOPICAL
Refills: 0 | Status: DISCONTINUED | OUTPATIENT
Start: 2022-07-01 | End: 2022-07-02

## 2022-07-01 RX ORDER — DEXTROSE 50 % IN WATER 50 %
50 SYRINGE (ML) INTRAVENOUS
Refills: 0 | Status: DISCONTINUED | OUTPATIENT
Start: 2022-07-01 | End: 2022-07-06

## 2022-07-01 RX ORDER — FENTANYL CITRATE 50 UG/ML
25 INJECTION INTRAVENOUS ONCE
Refills: 0 | Status: DISCONTINUED | OUTPATIENT
Start: 2022-07-01 | End: 2022-07-02

## 2022-07-01 RX ORDER — ASPIRIN/CALCIUM CARB/MAGNESIUM 324 MG
300 TABLET ORAL ONCE
Refills: 0 | Status: DISCONTINUED | OUTPATIENT
Start: 2022-07-01 | End: 2022-07-02

## 2022-07-01 RX ORDER — ACETAMINOPHEN 500 MG
650 TABLET ORAL EVERY 6 HOURS
Refills: 0 | Status: DISCONTINUED | OUTPATIENT
Start: 2022-07-02 | End: 2022-07-06

## 2022-07-01 RX ORDER — FAMOTIDINE 10 MG/ML
20 INJECTION INTRAVENOUS EVERY 12 HOURS
Refills: 0 | Status: COMPLETED | OUTPATIENT
Start: 2022-07-01 | End: 2022-07-02

## 2022-07-01 RX ORDER — ENOXAPARIN SODIUM 100 MG/ML
40 INJECTION SUBCUTANEOUS EVERY 24 HOURS
Refills: 0 | Status: DISCONTINUED | OUTPATIENT
Start: 2022-07-03 | End: 2022-07-06

## 2022-07-01 RX ORDER — ALBUMIN HUMAN 25 %
500 VIAL (ML) INTRAVENOUS ONCE
Refills: 0 | Status: COMPLETED | OUTPATIENT
Start: 2022-07-01 | End: 2022-07-01

## 2022-07-01 RX ORDER — CHLORHEXIDINE GLUCONATE 213 G/1000ML
15 SOLUTION TOPICAL EVERY 12 HOURS
Refills: 0 | Status: DISCONTINUED | OUTPATIENT
Start: 2022-07-01 | End: 2022-07-05

## 2022-07-01 RX ORDER — MILRINONE LACTATE 1 MG/ML
0.38 INJECTION, SOLUTION INTRAVENOUS
Qty: 20 | Refills: 0 | Status: DISCONTINUED | OUTPATIENT
Start: 2022-07-01 | End: 2022-07-02

## 2022-07-01 RX ORDER — NICARDIPINE HYDROCHLORIDE 30 MG/1
5 CAPSULE, EXTENDED RELEASE ORAL
Qty: 40 | Refills: 0 | Status: DISCONTINUED | OUTPATIENT
Start: 2022-07-01 | End: 2022-07-05

## 2022-07-01 RX ORDER — DEXMEDETOMIDINE HYDROCHLORIDE IN 0.9% SODIUM CHLORIDE 4 UG/ML
0.25 INJECTION INTRAVENOUS
Qty: 200 | Refills: 0 | Status: DISCONTINUED | OUTPATIENT
Start: 2022-07-01 | End: 2022-07-02

## 2022-07-01 RX ORDER — IPRATROPIUM BROMIDE 0.2 MG/ML
2 SOLUTION, NON-ORAL INHALATION EVERY 6 HOURS
Refills: 0 | Status: DISCONTINUED | OUTPATIENT
Start: 2022-07-01 | End: 2022-07-02

## 2022-07-01 RX ORDER — OXYCODONE HYDROCHLORIDE 5 MG/1
5 TABLET ORAL EVERY 4 HOURS
Refills: 0 | Status: DISCONTINUED | OUTPATIENT
Start: 2022-07-02 | End: 2022-07-06

## 2022-07-01 RX ORDER — INSULIN HUMAN 100 [IU]/ML
1 INJECTION, SOLUTION SUBCUTANEOUS
Qty: 100 | Refills: 0 | Status: DISCONTINUED | OUTPATIENT
Start: 2022-07-01 | End: 2022-07-05

## 2022-07-01 RX ORDER — PROPOFOL 10 MG/ML
15 INJECTION, EMULSION INTRAVENOUS
Qty: 1000 | Refills: 0 | Status: DISCONTINUED | OUTPATIENT
Start: 2022-07-01 | End: 2022-07-02

## 2022-07-01 RX ORDER — NITROGLYCERIN 6.5 MG
5 CAPSULE, EXTENDED RELEASE ORAL
Qty: 50 | Refills: 0 | Status: DISCONTINUED | OUTPATIENT
Start: 2022-07-01 | End: 2022-07-05

## 2022-07-01 RX ORDER — ALBUTEROL 90 UG/1
2 AEROSOL, METERED ORAL EVERY 6 HOURS
Refills: 0 | Status: DISCONTINUED | OUTPATIENT
Start: 2022-07-01 | End: 2022-07-02

## 2022-07-01 RX ORDER — VANCOMYCIN HCL 1 G
1000 VIAL (EA) INTRAVENOUS EVERY 12 HOURS
Refills: 0 | Status: COMPLETED | OUTPATIENT
Start: 2022-07-01 | End: 2022-07-02

## 2022-07-01 RX ORDER — FENTANYL CITRATE 50 UG/ML
25 INJECTION INTRAVENOUS ONCE
Refills: 0 | Status: DISCONTINUED | OUTPATIENT
Start: 2022-07-01 | End: 2022-07-01

## 2022-07-01 RX ORDER — NOREPINEPHRINE BITARTRATE/D5W 8 MG/250ML
0.05 PLASTIC BAG, INJECTION (ML) INTRAVENOUS
Qty: 8 | Refills: 0 | Status: DISCONTINUED | OUTPATIENT
Start: 2022-07-01 | End: 2022-07-03

## 2022-07-01 RX ORDER — ASPIRIN/CALCIUM CARB/MAGNESIUM 324 MG
325 TABLET ORAL DAILY
Refills: 0 | Status: DISCONTINUED | OUTPATIENT
Start: 2022-07-02 | End: 2022-07-06

## 2022-07-01 RX ORDER — ONDANSETRON 8 MG/1
4 TABLET, FILM COATED ORAL ONCE
Refills: 0 | Status: DISCONTINUED | OUTPATIENT
Start: 2022-07-01 | End: 2022-07-06

## 2022-07-01 RX ORDER — ACETAMINOPHEN 500 MG
1000 TABLET ORAL ONCE
Refills: 0 | Status: COMPLETED | OUTPATIENT
Start: 2022-07-01 | End: 2022-07-01

## 2022-07-01 RX ORDER — MEPERIDINE HYDROCHLORIDE 50 MG/ML
25 INJECTION INTRAMUSCULAR; INTRAVENOUS; SUBCUTANEOUS ONCE
Refills: 0 | Status: DISCONTINUED | OUTPATIENT
Start: 2022-07-01 | End: 2022-07-02

## 2022-07-01 RX ORDER — SODIUM CHLORIDE 9 MG/ML
1000 INJECTION INTRAMUSCULAR; INTRAVENOUS; SUBCUTANEOUS
Refills: 0 | Status: DISCONTINUED | OUTPATIENT
Start: 2022-07-01 | End: 2022-07-05

## 2022-07-01 RX ORDER — POLYETHYLENE GLYCOL 3350 17 G/17G
17 POWDER, FOR SOLUTION ORAL DAILY
Refills: 0 | Status: DISCONTINUED | OUTPATIENT
Start: 2022-07-01 | End: 2022-07-06

## 2022-07-01 RX ORDER — FAMOTIDINE 10 MG/ML
20 INJECTION INTRAVENOUS EVERY 12 HOURS
Refills: 0 | Status: DISCONTINUED | OUTPATIENT
Start: 2022-07-02 | End: 2022-07-06

## 2022-07-01 RX ORDER — DEXTROSE 50 % IN WATER 50 %
25 SYRINGE (ML) INTRAVENOUS
Refills: 0 | Status: DISCONTINUED | OUTPATIENT
Start: 2022-07-01 | End: 2022-07-06

## 2022-07-01 RX ADMIN — SODIUM CHLORIDE 3 MILLILITER(S): 9 INJECTION INTRAMUSCULAR; INTRAVENOUS; SUBCUTANEOUS at 21:59

## 2022-07-01 RX ADMIN — Medication 250 MILLILITER(S): at 22:32

## 2022-07-01 RX ADMIN — FAMOTIDINE 20 MILLIGRAM(S): 10 INJECTION INTRAVENOUS at 17:38

## 2022-07-01 RX ADMIN — SODIUM CHLORIDE 3 MILLILITER(S): 9 INJECTION INTRAMUSCULAR; INTRAVENOUS; SUBCUTANEOUS at 06:00

## 2022-07-01 RX ADMIN — SODIUM CHLORIDE 20 MILLILITER(S): 9 INJECTION INTRAMUSCULAR; INTRAVENOUS; SUBCUTANEOUS at 21:55

## 2022-07-01 RX ADMIN — Medication 250 MILLIGRAM(S): at 17:37

## 2022-07-01 RX ADMIN — CHLORHEXIDINE GLUCONATE 15 MILLILITER(S): 213 SOLUTION TOPICAL at 17:38

## 2022-07-01 RX ADMIN — FENTANYL CITRATE 25 MICROGRAM(S): 50 INJECTION INTRAVENOUS at 22:50

## 2022-07-01 RX ADMIN — MILRINONE LACTATE 9.84 MICROGRAM(S)/KG/MIN: 1 INJECTION, SOLUTION INTRAVENOUS at 22:28

## 2022-07-01 RX ADMIN — FAMOTIDINE 20 MILLIGRAM(S): 10 INJECTION INTRAVENOUS at 06:44

## 2022-07-01 RX ADMIN — Medication 400 MILLIGRAM(S): at 21:54

## 2022-07-01 RX ADMIN — Medication 8.2 MICROGRAM(S)/KG/MIN: at 21:55

## 2022-07-01 RX ADMIN — FENTANYL CITRATE 25 MICROGRAM(S): 50 INJECTION INTRAVENOUS at 23:05

## 2022-07-01 RX ADMIN — Medication 1000 MILLIGRAM(S): at 22:09

## 2022-07-01 RX ADMIN — INSULIN HUMAN 1 UNIT(S)/HR: 100 INJECTION, SOLUTION SUBCUTANEOUS at 21:55

## 2022-07-01 RX ADMIN — Medication 1500 MILLIGRAM(S): at 06:43

## 2022-07-01 RX ADMIN — Medication 250 MILLILITER(S): at 18:45

## 2022-07-01 NOTE — PACU DISCHARGE NOTE - COMMENTS
S/p CABG. Transported to CTU. HD stable. Signout given.    /54  P 76  R 17  O2 100  T 35.6  PA 30/17  CVP 15  CO/CI 5.5/2.7

## 2022-07-01 NOTE — BRIEF OPERATIVE NOTE - NSICDXBRIEFPREOP_GEN_ALL_CORE_FT
PRE-OP DIAGNOSIS:  Exertional angina 01-Jul-2022 16:15:45  Bladimir Barrera  Triple vessel coronary artery disease 01-Jul-2022 16:16:01  Bladimir Barrera

## 2022-07-01 NOTE — BRIEF OPERATIVE NOTE - NSICDXBRIEFPOSTOP_GEN_ALL_CORE_FT
POST-OP DIAGNOSIS:  Triple vessel coronary artery disease 01-Jul-2022 16:16:13  Bladimir Barrera  Exertional angina 01-Jul-2022 16:16:07  Bladimir Barrera

## 2022-07-01 NOTE — PROGRESS NOTE ADULT - SUBJECTIVE AND OBJECTIVE BOX
INTERVAL HISTORY: No overnight events.  	  MEDICATIONS:  abacavir 600 mG/dolutegravir 50 mG/lamivudine 300 mG 1 Tablet(s) Oral daily  acetaminophen   IVPB .. 1000 milliGRAM(s) IV Intermittent once  ALBUTerol    90 MICROgram(s) HFA Inhaler 2 Puff(s) Inhalation every 6 hours  aspirin enteric coated 81 milliGRAM(s) Oral daily  aspirin Suppository 300 milliGRAM(s) Rectal once  atorvastatin 40 milliGRAM(s) Oral at bedtime  chlorhexidine 0.12% Liquid 5 milliLiter(s) Oral Mucosa two times a day  chlorhexidine 0.12% Liquid 15 milliLiter(s) Swish and Spit once  chlorhexidine 0.12% Liquid 15 milliLiter(s) Oral Mucosa every 12 hours  dapsone 100 milliGRAM(s) Oral every 24 hours  dexMEDEtomidine Infusion 0.25 MICROgram(s)/kG/Hr IV Continuous <Continuous>  dextrose 50% Injectable 50 milliLiter(s) IV Push every 15 minutes  dextrose 50% Injectable 25 milliLiter(s) IV Push every 15 minutes  famotidine    Tablet 20 milliGRAM(s) Oral two times a day  famotidine Injectable 20 milliGRAM(s) IV Push every 12 hours  fentaNYL    Injectable 25 MICROGram(s) IV Push once PRN  heparin  Infusion 1200 Unit(s)/Hr IV Continuous <Continuous>  insulin regular Infusion 1 Unit(s)/Hr IV Continuous <Continuous>  ipratropium 17 MICROgram(s) HFA Inhaler 2 Puff(s) Inhalation every 6 hours  meperidine     Injectable 25 milliGRAM(s) IV Push once  milrinone Infusion 0.375 MICROgram(s)/kG/Min IV Continuous <Continuous>  niCARdipine Infusion 5 mG/Hr IV Continuous <Continuous>  nitroglycerin  Infusion 5 MICROgram(s)/Min IV Continuous <Continuous>  nitroglycerin  Infusion 16.667 MICROgram(s)/Min IV Continuous <Continuous>  norepinephrine Infusion 0.05 MICROgram(s)/kG/Min IV Continuous <Continuous>  ondansetron  IVPB 4 milliGRAM(s) IV Intermittent once PRN  polyethylene glycol 3350 17 Gram(s) Oral daily  propofol Infusion 15 MICROgram(s)/kG/Min IV Continuous <Continuous>  sodium chloride 0.9% lock flush 3 milliLiter(s) IV Push every 8 hours  sodium chloride 0.9%. 1000 milliLiter(s) IV Continuous <Continuous>  sulfaSALAzine 1500 milliGRAM(s) Oral two times a day  vancomycin  IVPB 1000 milliGRAM(s) IV Intermittent every 12 hours  vasopressin Infusion 0.04 Unit(s)/Min IV Continuous <Continuous>      REVIEW OF SYSTEMS:  CONSTITUTIONAL: No fever, weight loss, or fatigue  NECK: No pain or stiffness  RESPIRATORY: No cough, wheezing, chills or hemoptysis; No shortness of breath  CARDIOVASCULAR: No chest pain, palpitations, dizziness, or leg swelling  GASTROINTESTINAL: No abdominal or epigastric pain. No nausea, vomiting, or hematemesis; No diarrhea or constipation. No melena or hematochezia.  GENITOURINARY: No dysuria, frequency, hematuria, or incontinence  NEUROLOGICAL: No headaches, memory loss, loss of strength, numbness, or tremors  SKIN: No itching, burning, rashes, or lesions   ENDOCRINE: No heat or cold intolerance; No hair loss  MUSCULOSKELETAL: No joint pain or swelling; No muscle, back, or extremity pain  HEME/LYMPH: No easy bruising, or bleeding gums    PHYSICAL EXAM:  T(C): 37 (07-01-22 @ 19:00), Max: 37 (07-01-22 @ 19:00)  HR: 82 (07-01-22 @ 19:00) (67 - 85)  BP: 101/59 (07-01-22 @ 17:00) (101/59 - 159/71)  RR: 16 (07-01-22 @ 19:00) (9 - 21)  SpO2: 100% (07-01-22 @ 19:00) (92% - 100%)  Wt(kg): --  I&O's Summary    30 Jun 2022 07:01  -  01 Jul 2022 07:00  --------------------------------------------------------  IN: 1499.8 mL / OUT: 1400 mL / NET: 99.8 mL    01 Jul 2022 07:01  -  01 Jul 2022 19:57  --------------------------------------------------------  IN: 973.2 mL / OUT: 660 mL / NET: 313.2 mL          GENERAL: NAD, well-groomed, well-developed  HEAD:  Atraumatic, Normocephalic  NECK: Supple, No JVD, Normal thyroid  NERVOUS SYSTEM:  Alert & Oriented X3, Good concentration  CHEST/LUNG: Clear to percussion bilaterally; No rales, rhonchi, wheezing, or rubs  HEART: Regular rate and rhythm; No murmurs, rubs, or gallops  ABDOMEN: Soft, Nontender, Nondistended; Bowel sounds present  EXTREMITIES:  2+ Peripheral Pulses, No clubbing, cyanosis, or edema  SKIN: No rashes or lesions    LABS:	 	                              8.4    16.27 )-----------( 141      ( 01 Jul 2022 16:52 )             24.8     07-01    140  |  105  |  10  ----------------------------<  159<H>  4.5   |  23  |  0.7    Ca    8.5      01 Jul 2022 16:52  Mg     2.8     07-01    TPro  5.5<L>  /  Alb  4.2  /  TBili  0.6  /  DBili  x   /  AST  18  /  ALT  8   /  AlkPhos  62  07-01    proBNP:   Lipid Profile:

## 2022-07-02 LAB
ALBUMIN SERPL ELPH-MCNC: 4.7 G/DL — SIGNIFICANT CHANGE UP (ref 3.5–5.2)
ALP SERPL-CCNC: 55 U/L — SIGNIFICANT CHANGE UP (ref 30–115)
ALT FLD-CCNC: 9 U/L — SIGNIFICANT CHANGE UP (ref 0–41)
ANION GAP SERPL CALC-SCNC: 11 MMOL/L — SIGNIFICANT CHANGE UP (ref 7–14)
AST SERPL-CCNC: 42 U/L — HIGH (ref 0–41)
BASE EXCESS BLDMV CALC-SCNC: -5 MMOL/L — SIGNIFICANT CHANGE UP
BASOPHILS # BLD AUTO: 0.02 K/UL — SIGNIFICANT CHANGE UP (ref 0–0.2)
BASOPHILS NFR BLD AUTO: 0.2 % — SIGNIFICANT CHANGE UP (ref 0–1)
BILIRUB SERPL-MCNC: 0.3 MG/DL — SIGNIFICANT CHANGE UP (ref 0.2–1.2)
BUN SERPL-MCNC: 12 MG/DL — SIGNIFICANT CHANGE UP (ref 10–20)
CALCIUM SERPL-MCNC: 8.2 MG/DL — LOW (ref 8.5–10.1)
CHLORIDE SERPL-SCNC: 108 MMOL/L — SIGNIFICANT CHANGE UP (ref 98–110)
CO2 SERPL-SCNC: 22 MMOL/L — SIGNIFICANT CHANGE UP (ref 17–32)
CREAT SERPL-MCNC: 0.8 MG/DL — SIGNIFICANT CHANGE UP (ref 0.7–1.5)
EGFR: 102 ML/MIN/1.73M2 — SIGNIFICANT CHANGE UP
EOSINOPHIL # BLD AUTO: 0 K/UL — SIGNIFICANT CHANGE UP (ref 0–0.7)
EOSINOPHIL NFR BLD AUTO: 0 % — SIGNIFICANT CHANGE UP (ref 0–8)
GAS PNL BLDA: SIGNIFICANT CHANGE UP
GLUCOSE BLDC GLUCOMTR-MCNC: 105 MG/DL — HIGH (ref 70–99)
GLUCOSE BLDC GLUCOMTR-MCNC: 105 MG/DL — HIGH (ref 70–99)
GLUCOSE BLDC GLUCOMTR-MCNC: 114 MG/DL — HIGH (ref 70–99)
GLUCOSE BLDC GLUCOMTR-MCNC: 134 MG/DL — HIGH (ref 70–99)
GLUCOSE BLDC GLUCOMTR-MCNC: 136 MG/DL — HIGH (ref 70–99)
GLUCOSE BLDC GLUCOMTR-MCNC: 139 MG/DL — HIGH (ref 70–99)
GLUCOSE BLDC GLUCOMTR-MCNC: 139 MG/DL — HIGH (ref 70–99)
GLUCOSE BLDC GLUCOMTR-MCNC: 149 MG/DL — HIGH (ref 70–99)
GLUCOSE BLDC GLUCOMTR-MCNC: 155 MG/DL — HIGH (ref 70–99)
GLUCOSE BLDC GLUCOMTR-MCNC: 162 MG/DL — HIGH (ref 70–99)
GLUCOSE BLDC GLUCOMTR-MCNC: 164 MG/DL — HIGH (ref 70–99)
GLUCOSE SERPL-MCNC: 99 MG/DL — SIGNIFICANT CHANGE UP (ref 70–99)
HCO3 BLDMV-SCNC: 21 MMOL/L — SIGNIFICANT CHANGE UP
HCT VFR BLD CALC: 19.7 % — LOW (ref 42–52)
HCT VFR BLD CALC: 20.2 % — LOW (ref 42–52)
HGB BLD-MCNC: 6.6 G/DL — CRITICAL LOW (ref 14–18)
HGB BLD-MCNC: 6.7 G/DL — CRITICAL LOW (ref 14–18)
IMM GRANULOCYTES NFR BLD AUTO: 0.4 % — HIGH (ref 0.1–0.3)
LYMPHOCYTES # BLD AUTO: 0.46 K/UL — LOW (ref 1.2–3.4)
LYMPHOCYTES # BLD AUTO: 4.7 % — LOW (ref 20.5–51.1)
MAGNESIUM SERPL-MCNC: 2.5 MG/DL — HIGH (ref 1.8–2.4)
MCHC RBC-ENTMCNC: 28.2 PG — SIGNIFICANT CHANGE UP (ref 27–31)
MCHC RBC-ENTMCNC: 28.9 PG — SIGNIFICANT CHANGE UP (ref 27–31)
MCHC RBC-ENTMCNC: 33.2 G/DL — SIGNIFICANT CHANGE UP (ref 32–37)
MCHC RBC-ENTMCNC: 33.5 G/DL — SIGNIFICANT CHANGE UP (ref 32–37)
MCV RBC AUTO: 84.2 FL — SIGNIFICANT CHANGE UP (ref 80–94)
MCV RBC AUTO: 87.1 FL — SIGNIFICANT CHANGE UP (ref 80–94)
MONOCYTES # BLD AUTO: 1.13 K/UL — HIGH (ref 0.1–0.6)
MONOCYTES NFR BLD AUTO: 11.6 % — HIGH (ref 1.7–9.3)
NEUTROPHILS # BLD AUTO: 8.06 K/UL — HIGH (ref 1.4–6.5)
NEUTROPHILS NFR BLD AUTO: 83.1 % — HIGH (ref 42.2–75.2)
NRBC # BLD: 0 /100 WBCS — SIGNIFICANT CHANGE UP (ref 0–0)
NRBC # BLD: 0 /100 WBCS — SIGNIFICANT CHANGE UP (ref 0–0)
O2 CT VFR BLD CALC: 41 MMHG — SIGNIFICANT CHANGE UP
PCO2 BLDMV: 42 MMHG — SIGNIFICANT CHANGE UP
PH BLDMV: 7.31 — SIGNIFICANT CHANGE UP
PLATELET # BLD AUTO: 116 K/UL — LOW (ref 130–400)
PLATELET # BLD AUTO: 116 K/UL — LOW (ref 130–400)
POTASSIUM SERPL-MCNC: 4.2 MMOL/L — SIGNIFICANT CHANGE UP (ref 3.5–5)
POTASSIUM SERPL-SCNC: 4.2 MMOL/L — SIGNIFICANT CHANGE UP (ref 3.5–5)
PROT SERPL-MCNC: 5.8 G/DL — LOW (ref 6–8)
RBC # BLD: 2.32 M/UL — LOW (ref 4.7–6.1)
RBC # BLD: 2.34 M/UL — LOW (ref 4.7–6.1)
RBC # FLD: 15.4 % — HIGH (ref 11.5–14.5)
RBC # FLD: 15.7 % — HIGH (ref 11.5–14.5)
SAO2 % BLDMV: 65.7 % — SIGNIFICANT CHANGE UP
SODIUM SERPL-SCNC: 141 MMOL/L — SIGNIFICANT CHANGE UP (ref 135–146)
WBC # BLD: 10.25 K/UL — SIGNIFICANT CHANGE UP (ref 4.8–10.8)
WBC # BLD: 9.71 K/UL — SIGNIFICANT CHANGE UP (ref 4.8–10.8)
WBC # FLD AUTO: 10.25 K/UL — SIGNIFICANT CHANGE UP (ref 4.8–10.8)
WBC # FLD AUTO: 9.71 K/UL — SIGNIFICANT CHANGE UP (ref 4.8–10.8)

## 2022-07-02 PROCEDURE — 71045 X-RAY EXAM CHEST 1 VIEW: CPT | Mod: 26

## 2022-07-02 PROCEDURE — 99233 SBSQ HOSP IP/OBS HIGH 50: CPT

## 2022-07-02 RX ORDER — KETOROLAC TROMETHAMINE 30 MG/ML
30 SYRINGE (ML) INJECTION ONCE
Refills: 0 | Status: DISCONTINUED | OUTPATIENT
Start: 2022-07-02 | End: 2022-07-02

## 2022-07-02 RX ORDER — ACETAMINOPHEN 500 MG
1000 TABLET ORAL ONCE
Refills: 0 | Status: COMPLETED | OUTPATIENT
Start: 2022-07-02 | End: 2022-07-02

## 2022-07-02 RX ORDER — LISINOPRIL 2.5 MG/1
5 TABLET ORAL DAILY
Refills: 0 | Status: DISCONTINUED | OUTPATIENT
Start: 2022-07-02 | End: 2022-07-06

## 2022-07-02 RX ORDER — METOPROLOL TARTRATE 50 MG
2.5 TABLET ORAL ONCE
Refills: 0 | Status: COMPLETED | OUTPATIENT
Start: 2022-07-02 | End: 2022-07-02

## 2022-07-02 RX ORDER — DAPSONE 100 MG/1
100 TABLET ORAL DAILY
Refills: 0 | Status: DISCONTINUED | OUTPATIENT
Start: 2022-07-02 | End: 2022-07-06

## 2022-07-02 RX ORDER — OXYCODONE HYDROCHLORIDE 5 MG/1
10 TABLET ORAL EVERY 4 HOURS
Refills: 0 | Status: DISCONTINUED | OUTPATIENT
Start: 2022-07-02 | End: 2022-07-06

## 2022-07-02 RX ORDER — OXYCODONE HYDROCHLORIDE 5 MG/1
5 TABLET ORAL ONCE
Refills: 0 | Status: DISCONTINUED | OUTPATIENT
Start: 2022-07-02 | End: 2022-07-02

## 2022-07-02 RX ORDER — FUROSEMIDE 40 MG
40 TABLET ORAL ONCE
Refills: 0 | Status: COMPLETED | OUTPATIENT
Start: 2022-07-02 | End: 2022-07-02

## 2022-07-02 RX ORDER — AMIODARONE HYDROCHLORIDE 400 MG/1
200 TABLET ORAL EVERY 8 HOURS
Refills: 0 | Status: DISCONTINUED | OUTPATIENT
Start: 2022-07-02 | End: 2022-07-05

## 2022-07-02 RX ORDER — MILRINONE LACTATE 1 MG/ML
0.25 INJECTION, SOLUTION INTRAVENOUS
Qty: 20 | Refills: 0 | Status: DISCONTINUED | OUTPATIENT
Start: 2022-07-02 | End: 2022-07-03

## 2022-07-02 RX ADMIN — Medication 40 MILLIGRAM(S): at 13:45

## 2022-07-02 RX ADMIN — OXYCODONE HYDROCHLORIDE 5 MILLIGRAM(S): 5 TABLET ORAL at 04:37

## 2022-07-02 RX ADMIN — FAMOTIDINE 20 MILLIGRAM(S): 10 INJECTION INTRAVENOUS at 06:15

## 2022-07-02 RX ADMIN — SODIUM CHLORIDE 3 MILLILITER(S): 9 INJECTION INTRAMUSCULAR; INTRAVENOUS; SUBCUTANEOUS at 22:00

## 2022-07-02 RX ADMIN — Medication 400 MILLIGRAM(S): at 06:30

## 2022-07-02 RX ADMIN — FENTANYL CITRATE 25 MICROGRAM(S): 50 INJECTION INTRAVENOUS at 05:16

## 2022-07-02 RX ADMIN — OXYCODONE HYDROCHLORIDE 5 MILLIGRAM(S): 5 TABLET ORAL at 10:30

## 2022-07-02 RX ADMIN — Medication 30 MILLIGRAM(S): at 20:48

## 2022-07-02 RX ADMIN — OXYCODONE HYDROCHLORIDE 10 MILLIGRAM(S): 5 TABLET ORAL at 13:45

## 2022-07-02 RX ADMIN — OXYCODONE HYDROCHLORIDE 5 MILLIGRAM(S): 5 TABLET ORAL at 10:00

## 2022-07-02 RX ADMIN — OXYCODONE HYDROCHLORIDE 5 MILLIGRAM(S): 5 TABLET ORAL at 09:20

## 2022-07-02 RX ADMIN — DAPSONE 100 MILLIGRAM(S): 100 TABLET ORAL at 12:54

## 2022-07-02 RX ADMIN — FENTANYL CITRATE 25 MICROGRAM(S): 50 INJECTION INTRAVENOUS at 05:31

## 2022-07-02 RX ADMIN — Medication 30 MILLIGRAM(S): at 15:00

## 2022-07-02 RX ADMIN — OXYCODONE HYDROCHLORIDE 10 MILLIGRAM(S): 5 TABLET ORAL at 19:19

## 2022-07-02 RX ADMIN — SODIUM CHLORIDE 3 MILLILITER(S): 9 INJECTION INTRAMUSCULAR; INTRAVENOUS; SUBCUTANEOUS at 12:55

## 2022-07-02 RX ADMIN — ATORVASTATIN CALCIUM 40 MILLIGRAM(S): 80 TABLET, FILM COATED ORAL at 22:06

## 2022-07-02 RX ADMIN — Medication 250 MILLIGRAM(S): at 17:17

## 2022-07-02 RX ADMIN — OXYCODONE HYDROCHLORIDE 5 MILLIGRAM(S): 5 TABLET ORAL at 08:50

## 2022-07-02 RX ADMIN — Medication 1500 MILLIGRAM(S): at 17:19

## 2022-07-02 RX ADMIN — OXYCODONE HYDROCHLORIDE 5 MILLIGRAM(S): 5 TABLET ORAL at 05:07

## 2022-07-02 RX ADMIN — SODIUM CHLORIDE 3 MILLILITER(S): 9 INJECTION INTRAMUSCULAR; INTRAVENOUS; SUBCUTANEOUS at 06:11

## 2022-07-02 RX ADMIN — CHLORHEXIDINE GLUCONATE 5 MILLILITER(S): 213 SOLUTION TOPICAL at 06:17

## 2022-07-02 RX ADMIN — Medication 1000 MILLIGRAM(S): at 06:45

## 2022-07-02 RX ADMIN — LISINOPRIL 5 MILLIGRAM(S): 2.5 TABLET ORAL at 10:00

## 2022-07-02 RX ADMIN — ALBUTEROL 2 PUFF(S): 90 AEROSOL, METERED ORAL at 08:47

## 2022-07-02 RX ADMIN — Medication 30 MILLIGRAM(S): at 00:20

## 2022-07-02 RX ADMIN — Medication 30 MILLIGRAM(S): at 14:35

## 2022-07-02 RX ADMIN — Medication 400 MILLIGRAM(S): at 11:00

## 2022-07-02 RX ADMIN — Medication 30 MILLIGRAM(S): at 21:03

## 2022-07-02 RX ADMIN — FAMOTIDINE 20 MILLIGRAM(S): 10 INJECTION INTRAVENOUS at 17:18

## 2022-07-02 RX ADMIN — Medication 2 PUFF(S): at 08:18

## 2022-07-02 RX ADMIN — ALBUTEROL 2 PUFF(S): 90 AEROSOL, METERED ORAL at 14:34

## 2022-07-02 RX ADMIN — Medication 2.5 MILLIGRAM(S): at 22:08

## 2022-07-02 RX ADMIN — Medication 30 MILLIGRAM(S): at 00:35

## 2022-07-02 RX ADMIN — OXYCODONE HYDROCHLORIDE 10 MILLIGRAM(S): 5 TABLET ORAL at 18:53

## 2022-07-02 RX ADMIN — AMIODARONE HYDROCHLORIDE 200 MILLIGRAM(S): 400 TABLET ORAL at 22:07

## 2022-07-02 RX ADMIN — Medication 2 PUFF(S): at 14:32

## 2022-07-02 RX ADMIN — Medication 250 MILLIGRAM(S): at 06:16

## 2022-07-02 RX ADMIN — OXYCODONE HYDROCHLORIDE 10 MILLIGRAM(S): 5 TABLET ORAL at 14:15

## 2022-07-02 RX ADMIN — Medication 325 MILLIGRAM(S): at 12:05

## 2022-07-02 RX ADMIN — Medication 1000 MILLIGRAM(S): at 11:15

## 2022-07-02 RX ADMIN — AMIODARONE HYDROCHLORIDE 200 MILLIGRAM(S): 400 TABLET ORAL at 10:00

## 2022-07-02 RX ADMIN — POLYETHYLENE GLYCOL 3350 17 GRAM(S): 17 POWDER, FOR SOLUTION ORAL at 12:05

## 2022-07-02 RX ADMIN — Medication 1500 MILLIGRAM(S): at 06:54

## 2022-07-02 NOTE — PROGRESS NOTE ADULT - SUBJECTIVE AND OBJECTIVE BOX
CTU Attending Progress Daily Note     02 Jul 2022 12:57  Admited 06-29-22, Hospital Day 3d  POD# - 1    HPI:  61 y/old M here for [facility] due to chest pain, + CT ANGIO HEART CORONARY ( CS = 2032 )  PMH: HIV, Formal drug user, RA, GERD, Hep C, Thrombocytopenia, Esophageal spasm                  Home Medications:  celecoxib 200 mg oral capsule: 1 cap(s) orally once a day (29 Jun 2022 11:51)  dapsone 100 mg oral tablet: orally 3 times a week m w f  (29 Jun 2022 11:51)  hyoscyamine 0.375 mg oral capsule, extended release: 1 tab(s) orally (29 Jun 2022 11:51)  lisinopril 30 mg oral tablet: 1 tab(s) orally once a day (29 Jun 2022 11:51)  Pepcid 40 mg oral tablet: 1 tab(s) orally once a day (at bedtime) (29 Jun 2022 11:51)  sulfaSALAzine 500 mg oral delayed release tablet: 2 tab(s) orally 2 times a day (29 Jun 2022 11:51)  Triumeq oral tablet: 1 tab(s) orally once a day 600/50/300 mg po takes after meal (29 Jun 2022 11:51)    FAMILY HISTORY:  CAD (coronary artery disease) (Mother)    HTN (hypertension) (Mother, Father)      PAST MEDICAL & SURGICAL HISTORY:  HIV (human immunodeficiency virus infection)  x 25 yrs      Hepatitis C, chronic  dx 2yr no tx per pt      Thrombocytopenia  2 yr plt ct low 32 high 150      History of surgery  ls laminectomy  97 98      History of surgery  rt elbow sx child  bone spur      History of surgery  lt hip i and d 24 yrs        Interval event for past 24 hr:  CHET DAY  61y had no event.   Current Complains:  CHET DAY has no new complains  Allergies    penicillins (Hives)    Intolerances      OBJECTIVE:  Vitals last 24 hrs  T(C): 37.7 (07-02-22 @ 12:00), Max: 38.2 (07-02-22 @ 06:00)  T(F): 99.9 (07-02-22 @ 12:00), Max: 100.8 (07-02-22 @ 06:00)  HR: 97 (07-02-22 @ 12:00) (79 - 110)  BP: 98/55 (07-02-22 @ 12:00) (89/51 - 127/75)  ABP: 122/46 (07-02-22 @ 12:00) (88/51 - 149/61)  ABP(mean): 65 (07-02-22 @ 12:00) (59 - 91)  RR: 34 (07-02-22 @ 12:00) (0 - 34)  SpO2: 95% (07-02-22 @ 12:00) (92% - 100%)  CVP(mm Hg): 6 (07-02-22 @ 12:00) (4 - 31)  CI: 4.6 (07-02-22 @ 10:00) (2.4 - 5)  PA: 20/7 (07-02-22 @ 12:00) (18/7 - 104/49)  PA(direct): --  PCWP: --  SVR: 524 (07-02-22 @ 10:00) (468  1104)  PVR: --    07-01-22 @ 07:01  -  07-02-22 @ 07:00  --------------------------------------------------------  IN:    Albumin 5%  - 500 mL: 1000 mL    Dexmedetomidine: 127.3 mL    Insulin: 38 mL    IV PiggyBack: 700 mL    Milrinone: 155.2 mL    NiCARdipine: 130 mL    Nitroglycerin: 120 mL    Norepinephrine: 37.9 mL    sodium chloride 0.9%: 320 mL    Vasopressin: 24 mL  Total IN: 2652.4 mL    OUT:    Chest Tube (mL): 275 mL    Chest Tube (mL): 2 mL    Indwelling Catheter - Urethral (mL): 890 mL    Nasogastric/Oral tube (mL): 0 mL    Voided (mL): 300 mL  Total OUT: 1467 mL    Total NET: 1185.4 mL    CAPILLARY BLOOD GLUCOSE      POCT Blood Glucose.: 136 mg/dL (02 Jul 2022 11:03)  POCT Blood Glucose.: 149 mg/dL (02 Jul 2022 09:55)  POCT Blood Glucose.: 155 mg/dL (02 Jul 2022 08:17)  POCT Blood Glucose.: 162 mg/dL (02 Jul 2022 06:22)  POCT Blood Glucose.: 134 mg/dL (02 Jul 2022 03:57)  POCT Blood Glucose.: 114 mg/dL (02 Jul 2022 02:16)  POCT Blood Glucose.: 105 mg/dL (02 Jul 2022 01:13)  POCT Blood Glucose.: 105 mg/dL (02 Jul 2022 00:20)  POCT Blood Glucose.: 120 mg/dL (01 Jul 2022 22:16)  POCT Blood Glucose.: 122 mg/dL (01 Jul 2022 21:02)  POCT Blood Glucose.: 135 mg/dL (01 Jul 2022 20:18)  POCT Blood Glucose.: 202 mg/dL (01 Jul 2022 18:49)  POCT Blood Glucose.: 175 mg/dL (01 Jul 2022 17:48)  POCT Blood Glucose.: 159 mg/dL (01 Jul 2022 16:41)    LABS:  ABG - ( 02 Jul 2022 02:19 )  pH, Arterial: 7.36  pH, Blood: x     /  pCO2: 36    /  pO2: 186   / HCO3: 20    / Base Excess: -4.7  /  SaO2: 100.0           Blood Gas Arterial, Lactate: 0.60 mmol/L (07-02-22 @ 02:19)  Blood Gas Arterial, Lactate: 0.70 mmol/L (07-01-22 @ 21:28)  Blood Gas Arterial, Lactate: 1.00 mmol/L (07-01-22 @ 16:39)                          6.7    10.25 )-----------( 116      ( 02 Jul 2022 06:27 )             20.2     Hemoglobin: 6.7 g/dL (07-02 @ 06:27)  Hemoglobin: 6.6 g/dL (07-02 @ 01:30)  Hemoglobin: 8.4 g/dL (07-01 @ 16:52)  Hemoglobin: 11.2 g/dL (06-30 @ 19:13)  Hemoglobin: 11.0 g/dL (06-29 @ 14:50)    07-02    141  |  108  |  12  ----------------------------<  99  4.2   |  22  |  0.8    Ca    8.2<L>      02 Jul 2022 01:30  Mg     2.5     07-02    TPro  5.8<L>  /  Alb  4.7  /  TBili  0.3  /  DBili  x   /  AST  42<H>  /  ALT  9   /  AlkPhos  55  07-02    Creatinine, Serum: 0.8 mg/dL (07-02 @ 01:30)  Creatinine, Serum: 0.7 mg/dL (07-01 @ 16:52)  Creatinine, Serum: 0.8 mg/dL (06-30 @ 17:22)  Creatinine, Serum: 0.7 mg/dL (06-29 @ 14:50)  Creatinine, Serum: 0.9 mg/dL (06-29 @ 11:40)    PT/INR - ( 01 Jul 2022 16:52 )   PT: 13.90 sec;   INR: 1.21 ratio         PTT - ( 01 Jul 2022 16:52 )  PTT:25.7 sec      HOSPITAL MEDICATIONS:  MEDICATIONS  (STANDING):  abacavir 600 mG/dolutegravir 50 mG/lamivudine 300 mG 1 Tablet(s) Oral daily  ALBUTerol    90 MICROgram(s) HFA Inhaler 2 Puff(s) Inhalation every 6 hours  aMIOdarone    Tablet 200 milliGRAM(s) Oral every 8 hours  aspirin enteric coated 325 milliGRAM(s) Oral daily  atorvastatin 40 milliGRAM(s) Oral at bedtime  chlorhexidine 0.12% Liquid 15 milliLiter(s) Swish and Spit once  chlorhexidine 0.12% Liquid 15 milliLiter(s) Oral Mucosa every 12 hours  dapsone 100 milliGRAM(s) Oral daily  dextrose 50% Injectable 50 milliLiter(s) IV Push every 15 minutes  dextrose 50% Injectable 25 milliLiter(s) IV Push every 15 minutes  famotidine    Tablet 20 milliGRAM(s) Oral every 12 hours  famotidine    Tablet 20 milliGRAM(s) Oral two times a day  insulin regular Infusion 1 Unit(s)/Hr (1 mL/Hr) IV Continuous <Continuous>  ipratropium 17 MICROgram(s) HFA Inhaler 2 Puff(s) Inhalation every 6 hours  lisinopril 5 milliGRAM(s) Oral daily  milrinone Infusion 0.25 MICROgram(s)/kG/Min (6.56 mL/Hr) IV Continuous <Continuous>  niCARdipine Infusion 5 mG/Hr (25 mL/Hr) IV Continuous <Continuous>  nitroglycerin  Infusion 5 MICROgram(s)/Min (1.5 mL/Hr) IV Continuous <Continuous>  nitroglycerin  Infusion 16.667 MICROgram(s)/Min (5 mL/Hr) IV Continuous <Continuous>  polyethylene glycol 3350 17 Gram(s) Oral daily  sodium chloride 0.9% lock flush 3 milliLiter(s) IV Push every 8 hours  sodium chloride 0.9%. 1000 milliLiter(s) (20 mL/Hr) IV Continuous <Continuous>  sulfaSALAzine 1500 milliGRAM(s) Oral two times a day  vancomycin  IVPB 1000 milliGRAM(s) IV Intermittent every 12 hours    MEDICATIONS  (PRN):  acetaminophen     Tablet .. 650 milliGRAM(s) Oral every 6 hours PRN Temp greater or equal to 38C (100.4F), Mild Pain (1 - 3)  ondansetron  IVPB 4 milliGRAM(s) IV Intermittent once PRN Nausea and/or Vomiting  oxyCODONE    IR 5 milliGRAM(s) Oral every 4 hours PRN Moderate Pain (4 - 6)  oxyCODONE    IR 10 milliGRAM(s) Oral every 4 hours PRN Severe Pain (7 - 10)      REVIEW OF SYSTEMS:  CONSTITUTIONAL: [X] all negative; [ ] weakness, [ ] fevers, [ ] chills  EYES/ENT: [X] all negative; [ ] visual changes, [ ] vertigo, [ ] throat pain, [ ] eye pain  NECK: [X] all negative; [ ] pain, [ ] stiffness  RESPIRATORY: [ ] all negative, [x ] cough, [ ] wheezing, [ ] hemoptysis, [x ] shortness of breath, [ x ] chest pain  CARDIOVASCULAR: [X] all negative; [ ] anginal chest pain, [ ] palpitations, [ ] orthopnea  GASTROINTESTINAL: [X] all negative; [ ]abdominal pain, [ ] nausea, [ ] vomiting, [ ] hematemesis, [ ] diarrhea, [ ] constipation, [ ] melena, [ ] hematochezia.  GENITOURINARY: [X] all negative; [ ] dysuria, [ ] frequency, [ ] hematuria  NEUROLOGICAL: [X] all negative; [ ] numbness, [ ] weakness, [ ] paresthesias  MUSCULOSKELETAL: [ ] all negative, [x ] joint pain, [ ] joint swelling, [ ] joint redness, [ ] bone pain  SKIN: [X] all negative; [ ] itching, [ ] burning, [ ] rashes, [ ] lesions   All other review of systems is negative unless indicated above.    [  ] Unable to assess ROS because     PHYSICAL EXAM:          CONSTITUTIONAL: Well-developed; well-nourished; in no acute distress.   	SKIN: warm, dry, no rashes or lesions  	HEENT: Atraumatic. Normocephalic. PERRL. Moist membranes, no conjunctival injection, sclera clear  	NECK: Supple; non tender.  No JVD. No lymphadenopathy.  	CARD: Normal S1, S2. Rate and Rhythm are regular. No murmurs.  	RESP: Good air entry bilaterally, no wheezes, no rales no rhonchi.  	ABD: Soft, not tender, not distended, no CVA tenderness, no rebound no guarding, bowel sounds present  	EXT: Normal ROM.  No clubbing, no cyanosis, no pedal edema, no calf pain b/l, Peripheral pulses intact.  	LYMPH: No acute adenopathy.  	NEURO: Alert, awake, motor 5/5 R, 5/5 L, sensation intact bilat, CN 2-12 intact,          PSYCH: Cooperative, appropriate. Alert & oriented x 3    RADIOLOGY:  X Reviewed and interpreted by me  CxR from 07-02-22 shows mild congestion, no pneumothorax, no effusion, no cardiomegaly,   S-G Catheter in place, Chest Tubes in place,     ECG:  X Reviewed and interpreted by me NSR 84, QTc - 522

## 2022-07-02 NOTE — PROGRESS NOTE ADULT - SUBJECTIVE AND OBJECTIVE BOX
OPERATIVE PROCEDURE(s):                POD #  1 CABG x 4                      61yMale  SURGEON(s): SHANE Barrera  SUBJECTIVE ASSESSMENT: incisional pain    Vital Signs Last 24 Hrs  T(F): 100 (2022 07:00), Max: 100.8 (2022 06:00)  HR: 96 (2022 07:00) (67 - 109)  BP: 100/53 (2022 07:00) (89/51 - 156/77)  BP(mean): 71 (2022 07:00) (64 - 110)  ABP: 109/44 (2022 07:00) (88/51 - 149/61)  ABP(mean): 60 (2022 07:00)  RR: 19 (2022 07:00) (0 - 29)  SpO2: 96% (2022 07:00) (94% - 100%)  CVP(mm Hg): 4 (2022 07:00)  CVP(cm H2O): --  CO: 10.4 (2022 06:00)  CI: 5 (2022 06:00)  PA: 18/7 (2022 07:00)  SVR: 468 (2022 06:00)  Mode: CPAP with PS  FiO2: 40  PEEP: 5  PS: 6    I&O's Detail    2022 07:01  -  2022 07:00  --------------------------------------------------------  IN:    Albumin 5%  - 500 mL: 1000 mL    Dexmedetomidine: 127.3 mL    Insulin: 38 mL    IV PiggyBack: 700 mL    Milrinone: 155.2 mL    NiCARdipine: 130 mL    Nitroglycerin: 120 mL    Norepinephrine: 37.9 mL    sodium chloride 0.9%: 320 mL    Vasopressin: 24 mL  Total IN: 2652.4 mL    OUT:    Chest Tube (mL): 275 mL    Chest Tube (mL): 2 mL    Indwelling Catheter - Urethral (mL): 890 mL    Nasogastric/Oral tube (mL): 0 mL    Voided (mL): 300 mL  Total OUT: 1467 mL    Net:   I&O's Detail    2022 07:01  -  2022 07:00  --------------------------------------------------------  Total NET: 99.8 mL      2022 07:01  -  2022 07:00  --------------------------------------------------------  Total NET: 1185.4 mL    CAPILLARY BLOOD GLUCOSE      POCT Blood Glucose.: 162 mg/dL (2022 06:22)  POCT Blood Glucose.: 134 mg/dL (2022 03:57)  POCT Blood Glucose.: 114 mg/dL (2022 02:16)  POCT Blood Glucose.: 105 mg/dL (2022 01:13)  POCT Blood Glucose.: 105 mg/dL (2022 00:20)  POCT Blood Glucose.: 120 mg/dL (2022 22:16)  POCT Blood Glucose.: 122 mg/dL (2022 21:02)  POCT Blood Glucose.: 135 mg/dL (2022 20:18)  POCT Blood Glucose.: 202 mg/dL (2022 18:49)  POCT Blood Glucose.: 175 mg/dL (2022 17:48)  POCT Blood Glucose.: 159 mg/dL (2022 16:41)    Physical Exam:  General: NAD; A&Ox3  Cardiac: S1/S2, RRR, no murmur, no rubs  Lungs: unlabored respirations, CTA b/l, no wheeze, no rales, no crackles  Abdomen: Soft/NT/protuberant  Sternum: Intact, no click, incision healing well with no drainage  Incisions: Incisions clean/dry/intact  Extremities: No edema b/l lower extremities    Central Venous Catheter: Yes[]  critical patient   Tolliver Catheter: Yes  [] , critical patient strict I&O  BOWEL MOVEMENT:   [] NO,  CHEST TUBE(Left/Right):  [] YES     LABS:                        6.7<LL>  10.25 )-----------( 116<L>    ( 2022 06:27 )             20.2<L>                        6.6<LL>  9.71  )-----------( 116<L>    ( 2022 01:30 )             19.7<L>    07    141  |  108  |  12  ----------------------------<  99  4.2   |  22  |  0.8  07    140  |  105  |  10  ----------------------------<  159<H>  4.5   |  23  |  0.7    Ca    8.2<L>      2022 01:30  Mg     2.5         TPro  5.8<L> [6.0 - 8.0]  /  Alb  4.7 [3.5 - 5.2]  /  TBili  0.3 [0.2 - 1.2]  /  DBili  x   /  AST  42<H> [0 - 41]  /  ALT  9 [0 - 41]  /  AlkPhos  55 [30 - 115]      PT/INR - ( 2022 16:52 )   PT: ;   INR: 1.21 ratio         PTT - ( 2022 16:52 )  PTT:25.7 sec, PTT - ( 2022 17:22 )  PTT:31.3 sec  Urinalysis Basic - ( 2022 22:36 )    Color: Yellow / Appearance: Clear / S.023 / pH: x  Gluc: x / Ketone: Negative  / Bili: Negative / Urobili: <2 mg/dL   Blood: x / Protein: Negative / Nitrite: Negative   Leuk Esterase: Negative / RBC: x / WBC x   Sq Epi: x / Non Sq Epi: x / Bacteria: x      ABG - ( 2022 02:19 )  pH: 7.36  /  pCO2: 36    /  pO2: 186   / HCO3: 20    / Base Excess: -4.7  /  SaO2: 100.0 /  LA: 0.60       RADIOLOGY & ADDITIONAL TESTS:  CXR:  EKG:  MEDICATIONS  (STANDING):  abacavir 600 mG/dolutegravir 50 mG/lamivudine 300 mG 1 Tablet(s) Oral daily  ALBUTerol    90 MICROgram(s) HFA Inhaler 2 Puff(s) Inhalation every 6 hours  aspirin enteric coated 325 milliGRAM(s) Oral daily  aspirin enteric coated 81 milliGRAM(s) Oral daily  aspirin Suppository 300 milliGRAM(s) Rectal once  atorvastatin 40 milliGRAM(s) Oral at bedtime  chlorhexidine 0.12% Liquid 15 milliLiter(s) Swish and Spit once  chlorhexidine 0.12% Liquid 5 milliLiter(s) Oral Mucosa two times a day  chlorhexidine 0.12% Liquid 15 milliLiter(s) Oral Mucosa every 12 hours  dapsone 100 milliGRAM(s) Oral every 24 hours  dexMEDEtomidine Infusion 0.25 MICROgram(s)/kG/Hr (5.47 mL/Hr) IV Continuous <Continuous>  dextrose 50% Injectable 50 milliLiter(s) IV Push every 15 minutes  dextrose 50% Injectable 25 milliLiter(s) IV Push every 15 minutes  famotidine    Tablet 20 milliGRAM(s) Oral two times a day  famotidine    Tablet 20 milliGRAM(s) Oral every 12 hours  insulin regular Infusion 1 Unit(s)/Hr (1 mL/Hr) IV Continuous <Continuous>  ipratropium 17 MICROgram(s) HFA Inhaler 2 Puff(s) Inhalation every 6 hours  meperidine     Injectable 25 milliGRAM(s) IV Push once  milrinone Infusion 0.375 MICROgram(s)/kG/Min (9.84 mL/Hr) IV Continuous <Continuous>  niCARdipine Infusion 5 mG/Hr (25 mL/Hr) IV Continuous <Continuous>  nitroglycerin  Infusion 16.667 MICROgram(s)/Min (5 mL/Hr) IV Continuous <Continuous>  nitroglycerin  Infusion 5 MICROgram(s)/Min (1.5 mL/Hr) IV Continuous <Continuous>  norepinephrine Infusion 0.05 MICROgram(s)/kG/Min (8.2 mL/Hr) IV Continuous <Continuous>  polyethylene glycol 3350 17 Gram(s) Oral daily  propofol Infusion 15 MICROgram(s)/kG/Min (7.88 mL/Hr) IV Continuous <Continuous>  sodium chloride 0.9% lock flush 3 milliLiter(s) IV Push every 8 hours  sodium chloride 0.9%. 1000 milliLiter(s) (20 mL/Hr) IV Continuous <Continuous>  sulfaSALAzine 1500 milliGRAM(s) Oral two times a day  vancomycin  IVPB 1000 milliGRAM(s) IV Intermittent every 12 hours  vasopressin Infusion 0.04 Unit(s)/Min (2.4 mL/Hr) IV Continuous <Continuous>    MEDICATIONS  (PRN):  acetaminophen     Tablet .. 650 milliGRAM(s) Oral every 6 hours PRN Temp greater or equal to 38C (100.4F), Mild Pain (1 - 3)  ondansetron  IVPB 4 milliGRAM(s) IV Intermittent once PRN Nausea and/or Vomiting  oxyCODONE    IR 5 milliGRAM(s) Oral every 4 hours PRN Moderate Pain (4 - 6)    HEPARIN:  [] YES [] NO   LOVENOX:[] YES [] NO   SCD's: YES b/l  GI Prophylaxis: Protonix [], Pepcid [],    Post-Op Beta-Blockers: Yes [], No[], If No, then contraindication:  Post-Op Aspirin: Yes [],  No [], If No, then contraindication:  Post-Op Statin: Yes [], No[], If No, then contraindication:  Allergies    penicillins (Hives)    Intolerances      Ambulation/Activity Status:    Assessment/Plan:  61y Male status-post .....  - Case and plan discussed with CTU Intensivist and CT Surgeon - Dr. Reina/Bruce/Karen  - Continue CTU supportive care    - Continue DVT  - Continue GI prophylaxis  - Incentive Spirometry 10 times an hour  - Continue to advance physical activity as tolerated and continue PT/OT as directed  - CAD: Continue ASA, statin, BB  - Anemia secondary to:  _____ Chronic Disease    ______ Acute PO blood loss anemia,              track and trend H/H  - Bradycardia:    - DIOR (serum cr increase 0.3 over 48hr or rises 1.5 fold  over baseline  - fluid overload secondary too: ____   acute non cardiac fluid over load     - Heart failure:   ____ Acute     ___Chronic:   ____ Diastolic    _____ Systolic        ______ Combined Disastolic on Systolic  - A. Fib:  _____ none _____    Persistent   ______ Chronic   ______      Paroxysmal; Treatment -   - COPD/Hypoxia:   - DM/Glucose Control:     Social Service Disposition:     OPERATIVE PROCEDURE(s):                POD #  1 CABG x 4                      61yMale  SURGEON(s): SHANE Barrera  SUBJECTIVE ASSESSMENT: incisional pain    Vital Signs Last 24 Hrs  T(F): 100 (2022 07:00), Max: 100.8 (2022 06:00)  HR: 96 (2022 07:00) (67 - 109)  BP: 100/53 (2022 07:00) (89/51 - 156/77)  BP(mean): 71 (2022 07:00) (64 - 110)  ABP: 109/44 (2022 07:00) (88/51 - 149/61)  ABP(mean): 60 (2022 07:00)  RR: 19 (2022 07:00) (0 - 29)  SpO2: 96% (2022 07:00) (94% - 100%)  CVP(mm Hg): 4 (2022 07:00)  CVP(cm H2O): --  CO: 10.4 (2022 06:00)  CI: 5 (2022 06:00)  PA: 18/7 (2022 07:00)  SVR: 468 (2022 06:00)  Mode: CPAP with PS  FiO2: 40  PEEP: 5  PS: 6    I&O's Detail    2022 07:01  -  2022 07:00  --------------------------------------------------------  IN:    Albumin 5%  - 500 mL: 1000 mL    Dexmedetomidine: 127.3 mL    Insulin: 38 mL    IV PiggyBack: 700 mL    Milrinone: 155.2 mL    NiCARdipine: 130 mL    Nitroglycerin: 120 mL    Norepinephrine: 37.9 mL    sodium chloride 0.9%: 320 mL    Vasopressin: 24 mL  Total IN: 2652.4 mL    OUT:    Chest Tube (mL): 275 mL    Chest Tube (mL): 2 mL    Indwelling Catheter - Urethral (mL): 890 mL    Nasogastric/Oral tube (mL): 0 mL    Voided (mL): 300 mL  Total OUT: 1467 mL    Net:   I&O's Detail    2022 07:01  -  2022 07:00  --------------------------------------------------------  Total NET: 99.8 mL      2022 07:01  -  2022 07:00  --------------------------------------------------------  Total NET: 1185.4 mL    CAPILLARY BLOOD GLUCOSE      POCT Blood Glucose.: 162 mg/dL (2022 06:22)  POCT Blood Glucose.: 134 mg/dL (2022 03:57)  POCT Blood Glucose.: 114 mg/dL (2022 02:16)  POCT Blood Glucose.: 105 mg/dL (2022 01:13)  POCT Blood Glucose.: 105 mg/dL (2022 00:20)  POCT Blood Glucose.: 120 mg/dL (2022 22:16)  POCT Blood Glucose.: 122 mg/dL (2022 21:02)  POCT Blood Glucose.: 135 mg/dL (2022 20:18)  POCT Blood Glucose.: 202 mg/dL (2022 18:49)  POCT Blood Glucose.: 175 mg/dL (2022 17:48)  POCT Blood Glucose.: 159 mg/dL (2022 16:41)    Physical Exam:  General: NAD; A&Ox3  Cardiac: S1/S2, RRR, no murmur, no rubs  Lungs: unlabored very shallow respirations, b/l bs decreased at bases ? rales  Abdomen: Soft/NT/protuberant  Sternum: Intact, no click, incision healing well with no drainage  Incisions: Incisions clean/dry/intact  Extremities: No edema b/l lower extremities    Central Venous Catheter: Yes[]  critical patient   Tolliver Catheter: Yes  [] , critical patient strict I&O  BOWEL MOVEMENT:   [] NO,  CHEST TUBE(Left/Right):  [] YES     LABS:                        6.7<LL>  10.25 )-----------( 116<L>    ( 2022 06:27 )             20.2<L>                        6.6<LL>  9.71  )-----------( 116<L>    ( 2022 01:30 )             19.7<L>        141  |  108  |  12  ----------------------------<  99  4.2   |  22  |  0.8      140  |  105  |  10  ----------------------------<  159<H>  4.5   |  23  |  0.7    Ca    8.2<L>      2022 01:30  Mg     2.5         TPro  5.8<L> [6.0 - 8.0]  /  Alb  4.7 [3.5 - 5.2]  /  TBili  0.3 [0.2 - 1.2]  /  DBili  x   /  AST  42<H> [0 - 41]  /  ALT  9 [0 - 41]  /  AlkPhos  55 [30 - 115]  02    PT/INR - ( 2022 16:52 )   PT: ;   INR: 1.21 ratio         PTT - ( 2022 16:52 )  PTT:25.7 sec, PTT - ( 2022 17:22 )  PTT:31.3 sec  Urinalysis Basic - ( 2022 22:36 )    Color: Yellow / Appearance: Clear / S.023 / pH: x  Gluc: x / Ketone: Negative  / Bili: Negative / Urobili: <2 mg/dL   Blood: x / Protein: Negative / Nitrite: Negative   Leuk Esterase: Negative / RBC: x / WBC x   Sq Epi: x / Non Sq Epi: x / Bacteria: x      ABG - ( 2022 02:19 )  pH: 7.36  /  pCO2: 36    /  pO2: 186   / HCO3: 20    / Base Excess: -4.7  /  SaO2: 100.0 /  LA: 0.60       RADIOLOGY & ADDITIONAL TESTS:  CXR: < from: Xray Chest 1 View-PORTABLE IMMEDIATE (Xray Chest 1 View-PORTABLE IMMEDIATE .) (22 @ 18:10) >  1.  Support lines/tubes, as described.  2.  Minimal bibasilar atelectatic changes, otherwise no radiographic   evidence of acute cardiopulmonary disease.      < end of copied text >    MEDICATIONS  (STANDING):  abacavir 600 mG/dolutegravir 50 mG/lamivudine 300 mG 1 Tablet(s) Oral daily  ALBUTerol    90 MICROgram(s) HFA Inhaler 2 Puff(s) Inhalation every 6 hours  aspirin enteric coated 325 milliGRAM(s) Oral daily  aspirin enteric coated 81 milliGRAM(s) Oral daily  aspirin Suppository 300 milliGRAM(s) Rectal once  atorvastatin 40 milliGRAM(s) Oral at bedtime  chlorhexidine 0.12% Liquid 15 milliLiter(s) Swish and Spit once  chlorhexidine 0.12% Liquid 5 milliLiter(s) Oral Mucosa two times a day  chlorhexidine 0.12% Liquid 15 milliLiter(s) Oral Mucosa every 12 hours  dapsone 100 milliGRAM(s) Oral every 24 hours  dexMEDEtomidine Infusion 0.25 MICROgram(s)/kG/Hr (5.47 mL/Hr) IV Continuous <Continuous>  dextrose 50% Injectable 50 milliLiter(s) IV Push every 15 minutes  dextrose 50% Injectable 25 milliLiter(s) IV Push every 15 minutes  famotidine    Tablet 20 milliGRAM(s) Oral two times a day  famotidine    Tablet 20 milliGRAM(s) Oral every 12 hours  insulin regular Infusion 1 Unit(s)/Hr (1 mL/Hr) IV Continuous <Continuous>  ipratropium 17 MICROgram(s) HFA Inhaler 2 Puff(s) Inhalation every 6 hours  meperidine     Injectable 25 milliGRAM(s) IV Push once  milrinone Infusion 0.375 MICROgram(s)/kG/Min (9.84 mL/Hr) IV Continuous <Continuous>  niCARdipine Infusion 5 mG/Hr (25 mL/Hr) IV Continuous <Continuous>  nitroglycerin  Infusion 16.667 MICROgram(s)/Min (5 mL/Hr) IV Continuous <Continuous>  nitroglycerin  Infusion 5 MICROgram(s)/Min (1.5 mL/Hr) IV Continuous <Continuous>  norepinephrine Infusion 0.05 MICROgram(s)/kG/Min (8.2 mL/Hr) IV Continuous <Continuous>  polyethylene glycol 3350 17 Gram(s) Oral daily  propofol Infusion 15 MICROgram(s)/kG/Min (7.88 mL/Hr) IV Continuous <Continuous>  sodium chloride 0.9% lock flush 3 milliLiter(s) IV Push every 8 hours  sodium chloride 0.9%. 1000 milliLiter(s) (20 mL/Hr) IV Continuous <Continuous>  sulfaSALAzine 1500 milliGRAM(s) Oral two times a day  vancomycin  IVPB 1000 milliGRAM(s) IV Intermittent every 12 hours  vasopressin Infusion 0.04 Unit(s)/Min (2.4 mL/Hr) IV Continuous <Continuous>    MEDICATIONS  (PRN):  acetaminophen     Tablet .. 650 milliGRAM(s) Oral every 6 hours PRN Temp greater or equal to 38C (100.4F), Mild Pain (1 - 3)  ondansetron  IVPB 4 milliGRAM(s) IV Intermittent once PRN Nausea and/or Vomiting  oxyCODONE    IR 5 milliGRAM(s) Oral every 4 hours PRN Moderate Pain (4 - 6)    LOVENOX:[NO   SCD's: YES b/l  GI Prophylaxis: , Pepcid [],    Post-Op Beta-Blockers: no on milrinone   Post-Op Aspirin: Yes [],    Post-Op Statin: hold today due to slight elevation of LFT's  Allergies    penicillins (Hives)    Intolerances    Ambulation/Activity Status:    Assessment/Plan:  61y Male status-post 1 s/p CABG x 4  - Case and plan discussed with CTU Intensivist and CT Surgeon - Dr. Reina  - Continue CTU supportive care    - Start DVT prophylaxis  - Continue GI prophylaxis  - Incentive Spirometry 10 times an hour  - Continue to advance physical activity as tolerated and continue PT/OT as directed  - CAD: Continue ASA, hold statin due to slight transaminitis, hold  BB - still on milrinone  - Anemia secondary to: Chronic Disease   and Acute PO blood loss anemia,              track and trend H/H asymptomatic - may need transfusion  - Depressed LV function continue milrinone - decrease to 0.25  - HIV adjust dapsone dosing and continue antivirals  - HTN -  start lisinopril,   - fluid overload secondary too: acute non cardiac fluid over load    - tachycardia -  Amiodarone 200mg Q8 - EKG in AM  - A. Fib: prophylaxis with magnesium and amiodarone 200mg   - COPD/Hypoxia: start duonebs, continue supplemental O2, Incentive spirometery  - DM/Glucose Control: A1c 4.7 - continue insulin drip for today  - discontinue swan carleen catheter  - remove sump and if no drainage remove right CT  - ambulate    Social Service Disposition:  home later in the week

## 2022-07-02 NOTE — PROGRESS NOTE ADULT - ASSESSMENT
PROBLEMS:  I spent 45 minutes of critical care time examining patient, reviewing vitals, labs, medications, imaging and discussing with the team goals of care to prevent life-threatening in this patient who is at high risk for deterioration or death due to:      CAD - s/p CABG , Lipitor 40, no BB - pt on inotrope, sc lovenox 40  HFrEF acute on chronic - on Milrinone drip - drop the dose to 0.2 mcg/kg/min, d/c SG cath, IV lasix    Tachycardia - start Amiodarone 200 q 8  HTN - start lisinopril 5  DM - continue insulin drip  Anemia post op due to acute blood loss  - diuresis - and monitor  thrombocytopenia - monitor       Case and plan discussed with CT Surgeons and CTU PAs.  Continue CTU supportive care and ongoing plan of care as per continuing CTU rounds.   Continue DVT/GI prophylaxis.  Incentive Spirometry 10 times an hour.  Continue to advance physical activity as tolerated and continue PT/OT as directed.    PLAN  Neuro: move all 4 extremities. no sensory or motor deficits  Pain management.   oxyCODONE    IR 10 milliGRAM(s) Oral every 4 hours PRN    Pulm: Wean off supplemental oxygen as able. Daily CXR. Encourage coughing, deep breathing and use of incentive spirometry.   ALBUTerol    90 MICROgram(s) HFA Inhaler 2 Puff(s) Inhalation every 6 hours  ipratropium 17 MICROgram(s) HFA Inhaler 2 Puff(s) Inhalation every 6 hours    Cardio: Monitor telemetry/alarms. Continue supportive care   aMIOdarone    Tablet 200 milliGRAM(s) Oral every 8 hours  lisinopril 5 milliGRAM(s) Oral daily  milrinone Infusion 0.25 MICROgram(s)/kG/Min IV Continuous <Continuous>  nitroglycerin  Infusion 16.667 MICROgram(s)/Min IV Continuous <Continuous>    GI: Continue stool softeners.    famotidine    Tablet 20 milliGRAM(s) Oral two times a day  sulfaSALAzine 1500 milliGRAM(s) Oral two times a day    Nutrition: Continue present diet  Endocrine and glucose control:   atorvastatin 40 milliGRAM(s) Oral at bedtime    Renal: monitor urine output, supplement electrolytes as needed,     Vasc: Heparin SC and/or SCDs for DVT prophylaxis    ID: Stable, no fever , no chills.   abacavir 600 mG/dolutegravir 50 mG/lamivudine 300 mG 1 Tablet(s) Oral daily  dapsone 100 milliGRAM(s) Oral daily    Tubes: Monitor Chest tube output  Therapy: OOB/ambulate  Disposition: start planing discharge home or placement    Pertinent clinical, laboratory, radiographic, hemodynamic, echocardiographic, respiratory data, microbiologic data and chart were reviewed and analyzed frequently throughout the course of the day and night. GI and DVT prophylaxis, glycemic control, head of bed elevation and skin care issues were addressed.  Patient seen, examined and plan discussed with CT Surgery / CTICU team during rounds.    [ ] The patient remains in critical and unstable condition, and requires ICU care and monitoring  [x ] The patient is improving but requires continued monitoring and adjustment of therapy

## 2022-07-03 LAB
ALBUMIN SERPL ELPH-MCNC: 4.2 G/DL — SIGNIFICANT CHANGE UP (ref 3.5–5.2)
ALP SERPL-CCNC: 59 U/L — SIGNIFICANT CHANGE UP (ref 30–115)
ALT FLD-CCNC: 10 U/L — SIGNIFICANT CHANGE UP (ref 0–41)
ANION GAP SERPL CALC-SCNC: 12 MMOL/L — SIGNIFICANT CHANGE UP (ref 7–14)
AST SERPL-CCNC: 32 U/L — SIGNIFICANT CHANGE UP (ref 0–41)
BASOPHILS # BLD AUTO: 0.02 K/UL — SIGNIFICANT CHANGE UP (ref 0–0.2)
BASOPHILS NFR BLD AUTO: 0.2 % — SIGNIFICANT CHANGE UP (ref 0–1)
BILIRUB SERPL-MCNC: 0.4 MG/DL — SIGNIFICANT CHANGE UP (ref 0.2–1.2)
BUN SERPL-MCNC: 19 MG/DL — SIGNIFICANT CHANGE UP (ref 10–20)
CALCIUM SERPL-MCNC: 8.1 MG/DL — LOW (ref 8.5–10.1)
CHLORIDE SERPL-SCNC: 105 MMOL/L — SIGNIFICANT CHANGE UP (ref 98–110)
CO2 SERPL-SCNC: 22 MMOL/L — SIGNIFICANT CHANGE UP (ref 17–32)
CREAT SERPL-MCNC: 1 MG/DL — SIGNIFICANT CHANGE UP (ref 0.7–1.5)
EGFR: 87 ML/MIN/1.73M2 — SIGNIFICANT CHANGE UP
EOSINOPHIL # BLD AUTO: 0 K/UL — SIGNIFICANT CHANGE UP (ref 0–0.7)
EOSINOPHIL NFR BLD AUTO: 0 % — SIGNIFICANT CHANGE UP (ref 0–8)
GAS PNL BLDA: SIGNIFICANT CHANGE UP
GLUCOSE BLDC GLUCOMTR-MCNC: 103 MG/DL — HIGH (ref 70–99)
GLUCOSE BLDC GLUCOMTR-MCNC: 117 MG/DL — HIGH (ref 70–99)
GLUCOSE BLDC GLUCOMTR-MCNC: 122 MG/DL — HIGH (ref 70–99)
GLUCOSE BLDC GLUCOMTR-MCNC: 126 MG/DL — HIGH (ref 70–99)
GLUCOSE BLDC GLUCOMTR-MCNC: 130 MG/DL — HIGH (ref 70–99)
GLUCOSE SERPL-MCNC: 111 MG/DL — HIGH (ref 70–99)
HCT VFR BLD CALC: 18.9 % — LOW (ref 42–52)
HGB BLD-MCNC: 6.3 G/DL — CRITICAL LOW (ref 14–18)
IMM GRANULOCYTES NFR BLD AUTO: 0.4 % — HIGH (ref 0.1–0.3)
LYMPHOCYTES # BLD AUTO: 0.76 K/UL — LOW (ref 1.2–3.4)
LYMPHOCYTES # BLD AUTO: 6.8 % — LOW (ref 20.5–51.1)
MAGNESIUM SERPL-MCNC: 2.4 MG/DL — SIGNIFICANT CHANGE UP (ref 1.8–2.4)
MCHC RBC-ENTMCNC: 28.4 PG — SIGNIFICANT CHANGE UP (ref 27–31)
MCHC RBC-ENTMCNC: 33.3 G/DL — SIGNIFICANT CHANGE UP (ref 32–37)
MCV RBC AUTO: 85.1 FL — SIGNIFICANT CHANGE UP (ref 80–94)
MONOCYTES # BLD AUTO: 1.17 K/UL — HIGH (ref 0.1–0.6)
MONOCYTES NFR BLD AUTO: 10.5 % — HIGH (ref 1.7–9.3)
NEUTROPHILS # BLD AUTO: 9.18 K/UL — HIGH (ref 1.4–6.5)
NEUTROPHILS NFR BLD AUTO: 82.1 % — HIGH (ref 42.2–75.2)
NRBC # BLD: 0 /100 WBCS — SIGNIFICANT CHANGE UP (ref 0–0)
PLATELET # BLD AUTO: 116 K/UL — LOW (ref 130–400)
POTASSIUM SERPL-MCNC: 3.8 MMOL/L — SIGNIFICANT CHANGE UP (ref 3.5–5)
POTASSIUM SERPL-SCNC: 3.8 MMOL/L — SIGNIFICANT CHANGE UP (ref 3.5–5)
PROT SERPL-MCNC: 5.6 G/DL — LOW (ref 6–8)
RBC # BLD: 2.22 M/UL — LOW (ref 4.7–6.1)
RBC # FLD: 16.2 % — HIGH (ref 11.5–14.5)
SODIUM SERPL-SCNC: 139 MMOL/L — SIGNIFICANT CHANGE UP (ref 135–146)
WBC # BLD: 11.17 K/UL — HIGH (ref 4.8–10.8)
WBC # FLD AUTO: 11.17 K/UL — HIGH (ref 4.8–10.8)

## 2022-07-03 PROCEDURE — 71045 X-RAY EXAM CHEST 1 VIEW: CPT | Mod: 26

## 2022-07-03 PROCEDURE — 93010 ELECTROCARDIOGRAM REPORT: CPT

## 2022-07-03 PROCEDURE — 99233 SBSQ HOSP IP/OBS HIGH 50: CPT

## 2022-07-03 RX ORDER — CYCLOBENZAPRINE HYDROCHLORIDE 10 MG/1
10 TABLET, FILM COATED ORAL EVERY 8 HOURS
Refills: 0 | Status: COMPLETED | OUTPATIENT
Start: 2022-07-03 | End: 2022-07-03

## 2022-07-03 RX ORDER — KETOROLAC TROMETHAMINE 30 MG/ML
30 SYRINGE (ML) INJECTION ONCE
Refills: 0 | Status: DISCONTINUED | OUTPATIENT
Start: 2022-07-03 | End: 2022-07-03

## 2022-07-03 RX ORDER — FUROSEMIDE 40 MG
40 TABLET ORAL ONCE
Refills: 0 | Status: COMPLETED | OUTPATIENT
Start: 2022-07-03 | End: 2022-07-03

## 2022-07-03 RX ORDER — POTASSIUM CHLORIDE 20 MEQ
20 PACKET (EA) ORAL ONCE
Refills: 0 | Status: COMPLETED | OUTPATIENT
Start: 2022-07-03 | End: 2022-07-03

## 2022-07-03 RX ORDER — GABAPENTIN 400 MG/1
300 CAPSULE ORAL EVERY 8 HOURS
Refills: 0 | Status: DISCONTINUED | OUTPATIENT
Start: 2022-07-03 | End: 2022-07-06

## 2022-07-03 RX ORDER — METOPROLOL TARTRATE 50 MG
25 TABLET ORAL EVERY 12 HOURS
Refills: 0 | Status: DISCONTINUED | OUTPATIENT
Start: 2022-07-03 | End: 2022-07-06

## 2022-07-03 RX ORDER — ACETAMINOPHEN 500 MG
1000 TABLET ORAL ONCE
Refills: 0 | Status: COMPLETED | OUTPATIENT
Start: 2022-07-03 | End: 2022-07-03

## 2022-07-03 RX ORDER — CLOPIDOGREL BISULFATE 75 MG/1
75 TABLET, FILM COATED ORAL DAILY
Refills: 0 | Status: DISCONTINUED | OUTPATIENT
Start: 2022-07-03 | End: 2022-07-06

## 2022-07-03 RX ORDER — ONDANSETRON 8 MG/1
4 TABLET, FILM COATED ORAL ONCE
Refills: 0 | Status: COMPLETED | OUTPATIENT
Start: 2022-07-03 | End: 2022-07-03

## 2022-07-03 RX ORDER — MILRINONE LACTATE 1 MG/ML
0.12 INJECTION, SOLUTION INTRAVENOUS
Qty: 20 | Refills: 0 | Status: DISCONTINUED | OUTPATIENT
Start: 2022-07-03 | End: 2022-07-03

## 2022-07-03 RX ORDER — HYDROMORPHONE HYDROCHLORIDE 2 MG/ML
0.5 INJECTION INTRAMUSCULAR; INTRAVENOUS; SUBCUTANEOUS ONCE
Refills: 0 | Status: DISCONTINUED | OUTPATIENT
Start: 2022-07-03 | End: 2022-07-03

## 2022-07-03 RX ADMIN — Medication 40 MILLIGRAM(S): at 06:25

## 2022-07-03 RX ADMIN — POLYETHYLENE GLYCOL 3350 17 GRAM(S): 17 POWDER, FOR SOLUTION ORAL at 13:30

## 2022-07-03 RX ADMIN — SODIUM CHLORIDE 3 MILLILITER(S): 9 INJECTION INTRAMUSCULAR; INTRAVENOUS; SUBCUTANEOUS at 06:10

## 2022-07-03 RX ADMIN — Medication 1500 MILLIGRAM(S): at 05:42

## 2022-07-03 RX ADMIN — FAMOTIDINE 20 MILLIGRAM(S): 10 INJECTION INTRAVENOUS at 17:43

## 2022-07-03 RX ADMIN — HYDROMORPHONE HYDROCHLORIDE 0.5 MILLIGRAM(S): 2 INJECTION INTRAMUSCULAR; INTRAVENOUS; SUBCUTANEOUS at 00:55

## 2022-07-03 RX ADMIN — OXYCODONE HYDROCHLORIDE 10 MILLIGRAM(S): 5 TABLET ORAL at 04:11

## 2022-07-03 RX ADMIN — LISINOPRIL 5 MILLIGRAM(S): 2.5 TABLET ORAL at 05:43

## 2022-07-03 RX ADMIN — AMIODARONE HYDROCHLORIDE 200 MILLIGRAM(S): 400 TABLET ORAL at 21:58

## 2022-07-03 RX ADMIN — Medication 30 MILLIGRAM(S): at 06:15

## 2022-07-03 RX ADMIN — Medication 25 MILLIGRAM(S): at 17:43

## 2022-07-03 RX ADMIN — GABAPENTIN 300 MILLIGRAM(S): 400 CAPSULE ORAL at 21:58

## 2022-07-03 RX ADMIN — OXYCODONE HYDROCHLORIDE 10 MILLIGRAM(S): 5 TABLET ORAL at 04:41

## 2022-07-03 RX ADMIN — CYCLOBENZAPRINE HYDROCHLORIDE 10 MILLIGRAM(S): 10 TABLET, FILM COATED ORAL at 21:58

## 2022-07-03 RX ADMIN — Medication 30 MILLIGRAM(S): at 06:30

## 2022-07-03 RX ADMIN — AMIODARONE HYDROCHLORIDE 200 MILLIGRAM(S): 400 TABLET ORAL at 05:43

## 2022-07-03 RX ADMIN — OXYCODONE HYDROCHLORIDE 10 MILLIGRAM(S): 5 TABLET ORAL at 00:09

## 2022-07-03 RX ADMIN — AMIODARONE HYDROCHLORIDE 200 MILLIGRAM(S): 400 TABLET ORAL at 13:30

## 2022-07-03 RX ADMIN — CYCLOBENZAPRINE HYDROCHLORIDE 10 MILLIGRAM(S): 10 TABLET, FILM COATED ORAL at 13:29

## 2022-07-03 RX ADMIN — CHLORHEXIDINE GLUCONATE 15 MILLILITER(S): 213 SOLUTION TOPICAL at 17:43

## 2022-07-03 RX ADMIN — INSULIN HUMAN 1 UNIT(S)/HR: 100 INJECTION, SOLUTION SUBCUTANEOUS at 05:48

## 2022-07-03 RX ADMIN — ATORVASTATIN CALCIUM 40 MILLIGRAM(S): 80 TABLET, FILM COATED ORAL at 21:58

## 2022-07-03 RX ADMIN — FAMOTIDINE 20 MILLIGRAM(S): 10 INJECTION INTRAVENOUS at 05:49

## 2022-07-03 RX ADMIN — GABAPENTIN 300 MILLIGRAM(S): 400 CAPSULE ORAL at 13:29

## 2022-07-03 RX ADMIN — Medication 400 MILLIGRAM(S): at 13:45

## 2022-07-03 RX ADMIN — ONDANSETRON 4 MILLIGRAM(S): 8 TABLET, FILM COATED ORAL at 19:01

## 2022-07-03 RX ADMIN — CHLORHEXIDINE GLUCONATE 15 MILLILITER(S): 213 SOLUTION TOPICAL at 05:49

## 2022-07-03 RX ADMIN — CLOPIDOGREL BISULFATE 75 MILLIGRAM(S): 75 TABLET, FILM COATED ORAL at 13:28

## 2022-07-03 RX ADMIN — NICARDIPINE HYDROCHLORIDE 25 MG/HR: 30 CAPSULE, EXTENDED RELEASE ORAL at 05:48

## 2022-07-03 RX ADMIN — Medication 100 MILLIEQUIVALENT(S): at 05:47

## 2022-07-03 RX ADMIN — HYDROMORPHONE HYDROCHLORIDE 0.5 MILLIGRAM(S): 2 INJECTION INTRAMUSCULAR; INTRAVENOUS; SUBCUTANEOUS at 00:40

## 2022-07-03 RX ADMIN — Medication 1500 MILLIGRAM(S): at 17:43

## 2022-07-03 RX ADMIN — OXYCODONE HYDROCHLORIDE 10 MILLIGRAM(S): 5 TABLET ORAL at 23:28

## 2022-07-03 RX ADMIN — Medication 325 MILLIGRAM(S): at 13:30

## 2022-07-03 RX ADMIN — DAPSONE 100 MILLIGRAM(S): 100 TABLET ORAL at 13:30

## 2022-07-03 RX ADMIN — Medication 25 MILLIGRAM(S): at 13:29

## 2022-07-03 RX ADMIN — ENOXAPARIN SODIUM 40 MILLIGRAM(S): 100 INJECTION SUBCUTANEOUS at 05:43

## 2022-07-03 RX ADMIN — Medication 1000 MILLIGRAM(S): at 14:00

## 2022-07-03 RX ADMIN — OXYCODONE HYDROCHLORIDE 10 MILLIGRAM(S): 5 TABLET ORAL at 00:25

## 2022-07-03 NOTE — PROGRESS NOTE ADULT - ASSESSMENT
Assessment/Plan:  CAD-s/p CABG x 4-POD #2  1-BP control-metoprolol, lisinopril  2-serum glucose control-insulin infusion  3-fluid overload-diuresis  4-acute blood loss anemia-transfuse 2 units pRBCs, f/u CBC post transfusion  5-systolic heart failure-inotropic support  4-[id number]-pwkfra, monitor plts daily  7-tachycardia-amiodarone, beta-blockers  3-LPZ-[id number] antiretroviral tx, prophylactic meds

## 2022-07-03 NOTE — PROGRESS NOTE ADULT - SUBJECTIVE AND OBJECTIVE BOX
CHET DAY  MRN#: [id number]  Subjective:  Patient was seen and evalauted on AM rounds offerring no specific compliants at this time.    OBJECTIVE:  ICU Vital Signs Last 24 Hrs  T(C): 38.1 (2022 04:00), Max: 38.1 (2022 20:00)  T(F): 100.6 (2022 04:00), Max: 100.6 (2022 20:00)  HR: 117 (2022 07:00) (93 - 128)  BP: 145/65 (2022 21:00) (145/65 - 145/65)  BP(mean): 94 (2022 21:00) (94 - 94)  ABP: 125/51 (2022 07:00) (111/30 - 158/51)  ABP(mean): 71 (2022 07:00) (57 - 83)  RR: 35 (2022 07:00) (9 - 38)  SpO2: 97% (2022 07:00) (94% - 99%)      07-02 @ 07:01  -  -03 @ 07:00  --------------------------------------------------------  IN: 3482.6 mL / OUT: 1468 mL / NET: 2014.6 mL      CAPILLARY BLOOD GLUCOSE      POCT Blood Glucose.: 122 mg/dL (2022 06:28)      PHYSICAL EXAM:Daily     Daily Weight in k.4 (2022 06:00)  General: WN/WD NAD    HEENT:     + NCAT  + EOMI  - Conjuctival edema   - Icterus   - Thrush   - ETT  - NGT/OGT    Neck:         + FROM    - JVD     - Nodes     - Masses    + Mid-line trachea   - Tracheostomy    Chest:         - Sternal click  - Sternal drainage  + Pacing wires    - SubQ emphysema    Lungs:          + CTA   - Rhonchi    - Rales    - Wheezing     - Decreased BS   - Dullness R L    Cardiac:       + S1 + S2    + RRR   - Irregular   - S3  - S4    - Murmurs   - Rub   - Hamman’s sign     Abdomen:    + BS     + Soft    + Non-tender     - Distended    - Organomegaly  - PEG    Extremities:   - Cyanosis U/L   - Clubbing  U/L  - LE/UE Edema   + Capillary refill    + Pulses     Neuro:        + Awake   +  Alert   - Confused   - Lethargic   - Sedated   - Generalized Weakness    Skin:        - Rashes    - Erythema   + Normal incisions   + IV sites intact  - Sacral decubitus    HOSPITAL MEDICATIONS:  MEDICATIONS  (STANDING):  abacavir 600 mG/dolutegravir 50 mG/lamivudine 300 mG 1 Tablet(s) Oral daily  acetaminophen   IVPB .. 1000 milliGRAM(s) IV Intermittent once  aMIOdarone    Tablet 200 milliGRAM(s) Oral every 8 hours  aspirin enteric coated 325 milliGRAM(s) Oral daily  atorvastatin 40 milliGRAM(s) Oral at bedtime  chlorhexidine 0.12% Liquid 15 milliLiter(s) Oral Mucosa every 12 hours  clopidogrel Tablet 75 milliGRAM(s) Oral daily  cyclobenzaprine 10 milliGRAM(s) Oral every 8 hours  dapsone 100 milliGRAM(s) Oral daily  dextrose 50% Injectable 50 milliLiter(s) IV Push every 15 minutes  dextrose 50% Injectable 25 milliLiter(s) IV Push every 15 minutes  enoxaparin Injectable 40 milliGRAM(s) SubCutaneous every 24 hours  famotidine    Tablet 20 milliGRAM(s) Oral two times a day  famotidine    Tablet 20 milliGRAM(s) Oral every 12 hours  gabapentin 300 milliGRAM(s) Oral every 8 hours  insulin regular Infusion 1 Unit(s)/Hr (1 mL/Hr) IV Continuous <Continuous>  lisinopril 5 milliGRAM(s) Oral daily  metoprolol tartrate 25 milliGRAM(s) Oral every 12 hours  niCARdipine Infusion 5 mG/Hr (25 mL/Hr) IV Continuous <Continuous>  nitroglycerin  Infusion 5 MICROgram(s)/Min (1.5 mL/Hr) IV Continuous <Continuous>  nitroglycerin  Infusion 16.667 MICROgram(s)/Min (5 mL/Hr) IV Continuous <Continuous>  norepinephrine Infusion 0.05 MICROgram(s)/kG/Min (8.2 mL/Hr) IV Continuous <Continuous>  polyethylene glycol 3350 17 Gram(s) Oral daily  sodium chloride 0.9%. 1000 milliLiter(s) (20 mL/Hr) IV Continuous <Continuous>  sulfaSALAzine 1500 milliGRAM(s) Oral two times a day    MEDICATIONS  (PRN):  acetaminophen     Tablet .. 650 milliGRAM(s) Oral every 6 hours PRN Temp greater or equal to 38C (100.4F), Mild Pain (1 - 3)  ondansetron  IVPB 4 milliGRAM(s) IV Intermittent once PRN Nausea and/or Vomiting  oxyCODONE    IR 5 milliGRAM(s) Oral every 4 hours PRN Moderate Pain (4 - 6)  oxyCODONE    IR 10 milliGRAM(s) Oral every 4 hours PRN Severe Pain (7 - 10)      LABS:                        6.3    11.17 )-----------( 116      ( 2022 02:51 )             18.9    07-03    139  |  105  |  19  ----------------------------<  111<H>  3.8   |  22  |  1.0    Ca    8.1<L>      2022 02:51  Mg     2.4     07-03    TPro  5.6<L>  /  Alb  4.2  /  TBili  0.4  /  DBili  x   /  AST  32  /  ALT  10  /  AlkPhos  59  07-03    PT/INR - ( 2022 16:52 )   PT: 13.90 sec;   INR: 1.21 ratio         PTT - ( 2022 16:52 )  PTT:25.7 sec LIVER FUNCTIONS - ( 2022 02:51 )  Alb: 4.2 g/dL / Pro: 5.6 g/dL / ALK PHOS: 59 U/L / ALT: 10 U/L / AST: 32 U/L / GGT: x               RADIOLOGY:  X Reviewed and interpreted by me:     CARDIOPULMONARY DYSFUNCTION  - Respiratory status required supplemental oxygen & the following of continuous pulse oximetry for support & to prevent decompensation  - Continued early mobilization as tolerated  - Addressed analgesic regimen to optimize function    PREVENTION-PROPHYLAXIS  - ASA continued for graft occlusion-thromboembolism prophylaxis  - Lipitor was also started for long term graft patency  - Lovenox continued for VTE prophylaxis in addition to Venodyne boots  - Pepcid maintained for GI bleeding prophylaxis  - Lopressor continued for atrial fibrillation prophylaxis  - Metabolic stability & infection prophylaxis required review and adjustment of regular Insulin sliding scale and gylcemic regimen while following serial glucose levels to help achieve & maintain euglycemia  - Reviewed & addressed surgical site infection prophylaxis regimen CHET DAY  MRN#: [id number]  Subjective:  Patient was seen and evalauted on AM rounds offerring no specific compliants at this time.    OBJECTIVE:  ICU Vital Signs Last 24 Hrs  T(C): 38.1 (2022 04:00), Max: 38.1 (2022 20:00)  T(F): 100.6 (2022 04:00), Max: 100.6 (2022 20:00)  HR: 117 (2022 07:00) (93 - 128)  BP: 145/65 (2022 21:00) (145/65 - 145/65)  BP(mean): 94 (2022 21:00) (94 - 94)  ABP: 125/51 (2022 07:00) (111/30 - 158/51)  ABP(mean): 71 (2022 07:00) (57 - 83)  RR: 35 (2022 07:00) (9 - 38)  SpO2: 97% (2022 07:00) (94% - 99%)      07-02 @ 07:01  -  -03 @ 07:00  --------------------------------------------------------  IN: 3482.6 mL / OUT: 1468 mL / NET: 2014.6 mL      CAPILLARY BLOOD GLUCOSE      POCT Blood Glucose.: 122 mg/dL (2022 06:28)      PHYSICAL EXAM:Daily     Daily Weight in k.4 (2022 06:00)  General: WN/WD NAD    HEENT:     + NCAT  + EOMI  - Conjuctival edema   - Icterus   - Thrush   - ETT  - NGT/OGT    Neck:         + FROM    - JVD     - Nodes     - Masses    + Mid-line trachea   - Tracheostomy    Chest:         - Sternal click  - Sternal drainage  + Pacing wires    - SubQ emphysema    Lungs:          + CTA   - Rhonchi    - Rales    - Wheezing     - Decreased BS   - Dullness R L    Cardiac:       + S1 + S2    + RRR   - Irregular   - S3  - S4    - Murmurs   - Rub   - Hamman’s sign     Abdomen:    + BS     + Soft    + Non-tender     - Distended    - Organomegaly  - PEG    Extremities:   - Cyanosis U/L   - Clubbing  U/L  - LE/UE Edema   + Capillary refill    + Pulses     Neuro:        + Awake   +  Alert   - Confused   - Lethargic   - Sedated   - Generalized Weakness    Skin:        - Rashes    - Erythema   + Normal incisions   + IV sites intact  - Sacral decubitus    HOSPITAL MEDICATIONS:  MEDICATIONS  (STANDING):  abacavir 600 mG/dolutegravir 50 mG/lamivudine 300 mG 1 Tablet(s) Oral daily  acetaminophen   IVPB .. 1000 milliGRAM(s) IV Intermittent once  aMIOdarone    Tablet 200 milliGRAM(s) Oral every 8 hours  aspirin enteric coated 325 milliGRAM(s) Oral daily  atorvastatin 40 milliGRAM(s) Oral at bedtime  chlorhexidine 0.12% Liquid 15 milliLiter(s) Oral Mucosa every 12 hours  clopidogrel Tablet 75 milliGRAM(s) Oral daily  cyclobenzaprine 10 milliGRAM(s) Oral every 8 hours  dapsone 100 milliGRAM(s) Oral daily  dextrose 50% Injectable 50 milliLiter(s) IV Push every 15 minutes  dextrose 50% Injectable 25 milliLiter(s) IV Push every 15 minutes  enoxaparin Injectable 40 milliGRAM(s) SubCutaneous every 24 hours  famotidine    Tablet 20 milliGRAM(s) Oral two times a day  famotidine    Tablet 20 milliGRAM(s) Oral every 12 hours  gabapentin 300 milliGRAM(s) Oral every 8 hours  insulin regular Infusion 1 Unit(s)/Hr (1 mL/Hr) IV Continuous <Continuous>  lisinopril 5 milliGRAM(s) Oral daily  metoprolol tartrate 25 milliGRAM(s) Oral every 12 hours  niCARdipine Infusion 5 mG/Hr (25 mL/Hr) IV Continuous <Continuous>  nitroglycerin  Infusion 5 MICROgram(s)/Min (1.5 mL/Hr) IV Continuous <Continuous>  nitroglycerin  Infusion 16.667 MICROgram(s)/Min (5 mL/Hr) IV Continuous <Continuous>  norepinephrine Infusion 0.05 MICROgram(s)/kG/Min (8.2 mL/Hr) IV Continuous <Continuous>  polyethylene glycol 3350 17 Gram(s) Oral daily  sodium chloride 0.9%. 1000 milliLiter(s) (20 mL/Hr) IV Continuous <Continuous>  sulfaSALAzine 1500 milliGRAM(s) Oral two times a day    MEDICATIONS  (PRN):  acetaminophen     Tablet .. 650 milliGRAM(s) Oral every 6 hours PRN Temp greater or equal to 38C (100.4F), Mild Pain (1 - 3)  ondansetron  IVPB 4 milliGRAM(s) IV Intermittent once PRN Nausea and/or Vomiting  oxyCODONE    IR 5 milliGRAM(s) Oral every 4 hours PRN Moderate Pain (4 - 6)  oxyCODONE    IR 10 milliGRAM(s) Oral every 4 hours PRN Severe Pain (7 - 10)      LABS:                        6.3    11.17 )-----------( 116      ( 2022 02:51 )             18.9    07-03    139  |  105  |  19  ----------------------------<  111<H>  3.8   |  22  |  1.0    Ca    8.1<L>      2022 02:51  Mg     2.4     07-03    TPro  5.6<L>  /  Alb  4.2  /  TBili  0.4  /  DBili  x   /  AST  32  /  ALT  10  /  AlkPhos  59  07-03    PT/INR - ( 2022 16:52 )   PT: 13.90 sec;   INR: 1.21 ratio         PTT - ( 2022 16:52 )  PTT:25.7 sec LIVER FUNCTIONS - ( 2022 02:51 )  Alb: 4.2 g/dL / Pro: 5.6 g/dL / ALK PHOS: 59 U/L / ALT: 10 U/L / AST: 32 U/L / GGT: x               RADIOLOGY:  X Reviewed and interpreted by me: L-base opacity/effusion    CARDIOPULMONARY DYSFUNCTION  - Respiratory status required supplemental oxygen & the following of continuous pulse oximetry for support & to prevent decompensation  - Continued early mobilization as tolerated  - Addressed analgesic regimen to optimize function    PREVENTION-PROPHYLAXIS  - ASA continued for graft occlusion-thromboembolism prophylaxis  - Lipitor was also started for long term graft patency  - Lovenox continued for VTE prophylaxis in addition to Venodyne boots  - Pepcid maintained for GI bleeding prophylaxis  - Lopressor continued for atrial fibrillation prophylaxis  - Metabolic stability & infection prophylaxis required review and adjustment of regular Insulin sliding scale and gylcemic regimen while following serial glucose levels to help achieve & maintain euglycemia  - Reviewed & addressed surgical site infection prophylaxis regimen

## 2022-07-03 NOTE — PHYSICAL THERAPY INITIAL EVALUATION ADULT - GENERAL OBSERVATIONS, REHAB EVAL
Pt was approached for PT IE. Pt stated he has just ambulate with Nurse, CT was removed CC pain/fatigue. unable to participate at this time. Nurse made aware. PT will follow up
62 y/o M received in bed, left in b/s chair, nad, + tele, IV, SCD, son present. vitals: at rest in bed 148/57 112 98% on 4L O2, in chair post amb 156/57 112 97% on 4L O2. pt s/p blood transfusion for Hgb 6.3. pt is cleared for OOB and ambulation, RN present t/o session. pt ambulated 150 ft with cardiac RW and CG. pt tolerated the session without difficulty.

## 2022-07-03 NOTE — PROGRESS NOTE ADULT - SUBJECTIVE AND OBJECTIVE BOX
OPERATIVE PROCEDURE(s):                POD #    2 s/p CABG                   61yMale  SURGEON(s): SHANE Barrera  SUBJECTIVE ASSESSMENT: c/o of pain    Vital Signs Last 24 Hrs  T(F): 100.6 (03 Jul 2022 04:00), Max: 100.6 (02 Jul 2022 20:00)  HR: 117 (03 Jul 2022 07:00) (93 - 128)  BP: 145/65 (02 Jul 2022 21:00) (97/56 - 145/65)  BP(mean): 94 (02 Jul 2022 21:00) (73 - 94)  ABP: 125/51 (03 Jul 2022 07:00) (103/44 - 158/51)  ABP(mean): 71 (03 Jul 2022 07:00)  RR: 35 (03 Jul 2022 07:00) (9 - 38)  SpO2: 97% (03 Jul 2022 07:00) (94% - 99%)  CVP(mm Hg): 13 (02 Jul 2022 12:30)  CVP(cm H2O): --  CO: 9.8 (02 Jul 2022 12:30)  CI: 4.8 (02 Jul 2022 12:30)  PA: 28/15 (02 Jul 2022 12:30)  SVR: 489 (02 Jul 2022 12:30)    I&O's Detail    02 Jul 2022 07:01  -  03 Jul 2022 07:00  --------------------------------------------------------  IN:    Insulin: 82 mL    IV PiggyBack: 450 mL    Milrinone: 116.6 mL    Milrinone: 15 mL    NiCARdipine: 225 mL    Nitroglycerin: 625 mL    Oral Fluid: 1180 mL    PRBCs (Packed Red Blood Cells): 309 mL    sodium chloride 0.9%: 480 mL  Total IN: 3482.6 mL    OUT:    Chest Tube (mL): 75 mL    Chest Tube (mL): 38 mL    Indwelling Catheter - Urethral (mL): 1355 mL  Total OUT: 1468 mL    Net:   I&O's Detail    01 Jul 2022 07:01  -  02 Jul 2022 07:00  --------------------------------------------------------  Total NET: 1185.4 mL    02 Jul 2022 07:01  -  03 Jul 2022 07:00  --------------------------------------------------------  Total NET: 2014.6 mL      CAPILLARY BLOOD GLUCOSE      POCT Blood Glucose.: 122 mg/dL (03 Jul 2022 06:28)  POCT Blood Glucose.: 126 mg/dL (03 Jul 2022 02:40)  POCT Blood Glucose.: 164 mg/dL (02 Jul 2022 22:00)  POCT Blood Glucose.: 139 mg/dL (02 Jul 2022 16:41)  POCT Blood Glucose.: 139 mg/dL (02 Jul 2022 13:52)  POCT Blood Glucose.: 136 mg/dL (02 Jul 2022 11:03)  POCT Blood Glucose.: 149 mg/dL (02 Jul 2022 09:55)  POCT Blood Glucose.: 155 mg/dL (02 Jul 2022 08:17)    Physical Exam:  General: NAD; A&Ox3  Cardiac: S1/S2, RRR, no murmur, no rubs  Lungs: unlabored very shallow respirations, b/l bs decreased at bases ? rales  Abdomen: Soft/NT/protuberant  Sternum: Intact, no click, incision healing well with no drainage  Incisions: Incisions clean/dry/intact  Extremities: No edema b/l lower extremities    Central Venous Catheter: Yes[]  critical patient   Tolliver Catheter: Yes  [] , critical patient strict I&O  BOWEL MOVEMENT:   [] NO,      LABS:                        6.3<LL>  11.17<H> )-----------( 116<L>    ( 03 Jul 2022 02:51 )             18.9<L>                        6.7<LL>  10.25 )-----------( 116<L>    ( 02 Jul 2022 06:27 )             20.2<L>    07-03    139  |  105  |  19  ----------------------------<  111<H>  3.8   |  22  |  1.0  07-02    141  |  108  |  12  ----------------------------<  99  4.2   |  22  |  0.8    Ca    8.1<L>      03 Jul 2022 02:51  Mg     2.4     07-03    TPro  5.6<L> [6.0 - 8.0]  /  Alb  4.2 [3.5 - 5.2]  /  TBili  0.4 [0.2 - 1.2]  /  DBili  x   /  AST  32 [0 - 41]  /  ALT  10 [0 - 41]  /  AlkPhos  59 [30 - 115]  07-03    PT/INR - ( 01 Jul 2022 16:52 )   PT: ;   INR: 1.21 ratio         PTT - ( 01 Jul 2022 16:52 )  PTT:25.7 sec    ABG - ( 03 Jul 2022 03:40 )  pH: 7.43  /  pCO2: 35    /  pO2: 89    / HCO3: 23    / Base Excess: -1.0  /  SaO2: 99.1  /  LA: 0.70     RADIOLOGY & ADDITIONAL TESTS:  CXR:  EKG:  MEDICATIONS  (STANDING):  abacavir 600 mG/dolutegravir 50 mG/lamivudine 300 mG 1 Tablet(s) Oral daily  aMIOdarone    Tablet 200 milliGRAM(s) Oral every 8 hours  aspirin enteric coated 325 milliGRAM(s) Oral daily  atorvastatin 40 milliGRAM(s) Oral at bedtime  chlorhexidine 0.12% Liquid 15 milliLiter(s) Oral Mucosa every 12 hours  dapsone 100 milliGRAM(s) Oral daily  dextrose 50% Injectable 50 milliLiter(s) IV Push every 15 minutes  dextrose 50% Injectable 25 milliLiter(s) IV Push every 15 minutes  enoxaparin Injectable 40 milliGRAM(s) SubCutaneous every 24 hours  famotidine    Tablet 20 milliGRAM(s) Oral two times a day  famotidine    Tablet 20 milliGRAM(s) Oral every 12 hours  insulin regular Infusion 1 Unit(s)/Hr (1 mL/Hr) IV Continuous <Continuous>  lisinopril 5 milliGRAM(s) Oral daily  milrinone Infusion 0.25 MICROgram(s)/kG/Min (6.56 mL/Hr) IV Continuous <Continuous>  niCARdipine Infusion 5 mG/Hr (25 mL/Hr) IV Continuous <Continuous>  nitroglycerin  Infusion 5 MICROgram(s)/Min (1.5 mL/Hr) IV Continuous <Continuous>  nitroglycerin  Infusion 16.667 MICROgram(s)/Min (5 mL/Hr) IV Continuous <Continuous>  norepinephrine Infusion 0.05 MICROgram(s)/kG/Min (8.2 mL/Hr) IV Continuous <Continuous>  polyethylene glycol 3350 17 Gram(s) Oral daily  sodium chloride 0.9% lock flush 3 milliLiter(s) IV Push every 8 hours  sodium chloride 0.9%. 1000 milliLiter(s) (20 mL/Hr) IV Continuous <Continuous>  sulfaSALAzine 1500 milliGRAM(s) Oral two times a day    MEDICATIONS  (PRN):  acetaminophen     Tablet .. 650 milliGRAM(s) Oral every 6 hours PRN Temp greater or equal to 38C (100.4F), Mild Pain (1 - 3)  ondansetron  IVPB 4 milliGRAM(s) IV Intermittent once PRN Nausea and/or Vomiting  oxyCODONE    IR 5 milliGRAM(s) Oral every 4 hours PRN Moderate Pain (4 - 6)  oxyCODONE    IR 10 milliGRAM(s) Oral every 4 hours PRN Severe Pain (7 - 10)      Post-Op Beta-Blockers: no on milrinone   Post-Op Statin: hold today due to slight elevation of LFT's  Allergies    penicillins (Hives)    Intolerances    Ambulation/Activity Status:    Assessment/Plan:  61y Male status-post 1 s/p CABG x 2  - Case and plan discussed with CTU Intensivist and CT Surgeon - Dr. Barrera  - Continue CTU supportive care    - Start DVT prophylaxis  - Continue GI prophylaxis  - Incentive Spirometry 10 times an hour  - Continue to advance physical activity as tolerated and continue PT/OT as directed  - CAD: Continue ASA, start statin, hold  BB - still on milrinone  - Anemia secondary to: Chronic Disease   and Acute PO blood loss anemia,              track and trend H/H asymptomatic - may need transfusion  - Depressed LV function continue milrinone - decrease to 0.125  - HIV adjust dapsone dosing and continue antivirals  - HTN - lisinopril, add bb when off milrinone  - fluid overload secondary too: acute non cardiac fluid over load    - tachycardia -  Amiodarone 200mg Q8 - EKG   - A. Fib: prophylaxis with magnesium and amiodarone 200mg   - COPD/Hypoxia: start duonebs, continue supplemental O2, Incentive spirometery  - DM/Glucose Control: A1c 4.7 - continue insulin drip for today one more day  - ambulate    Social Service Disposition:  home later in the week   OPERATIVE PROCEDURE(s):                POD #    2 s/p CABG                   61yMale  SURGEON(s): SHANE Barrera  SUBJECTIVE ASSESSMENT: c/o of pain, not taking deep breath's     Vital Signs Last 24 Hrs  T(F): 100.6 (03 Jul 2022 04:00), Max: 100.6 (02 Jul 2022 20:00)  HR: 117 (03 Jul 2022 07:00) (93 - 128)  BP: 145/65 (02 Jul 2022 21:00) (97/56 - 145/65)  BP(mean): 94 (02 Jul 2022 21:00) (73 - 94)  ABP: 125/51 (03 Jul 2022 07:00) (103/44 - 158/51)  ABP(mean): 71 (03 Jul 2022 07:00)  RR: 35 (03 Jul 2022 07:00) (9 - 38)  SpO2: 97% (03 Jul 2022 07:00) (94% - 99%)  CVP(mm Hg): 13 (02 Jul 2022 12:30)  CVP(cm H2O): --  CO: 9.8 (02 Jul 2022 12:30)  CI: 4.8 (02 Jul 2022 12:30)  PA: 28/15 (02 Jul 2022 12:30)  SVR: 489 (02 Jul 2022 12:30)    I&O's Detail    02 Jul 2022 07:01  -  03 Jul 2022 07:00  --------------------------------------------------------  IN:    Insulin: 82 mL    IV PiggyBack: 450 mL    Milrinone: 116.6 mL    Milrinone: 15 mL    NiCARdipine: 225 mL    Nitroglycerin: 625 mL    Oral Fluid: 1180 mL    PRBCs (Packed Red Blood Cells): 309 mL    sodium chloride 0.9%: 480 mL  Total IN: 3482.6 mL    OUT:    Chest Tube (mL): 75 mL    Chest Tube (mL): 38 mL    Indwelling Catheter - Urethral (mL): 1355 mL  Total OUT: 1468 mL    Net:   I&O's Detail    01 Jul 2022 07:01  -  02 Jul 2022 07:00  --------------------------------------------------------  Total NET: 1185.4 mL    02 Jul 2022 07:01  -  03 Jul 2022 07:00  --------------------------------------------------------  Total NET: 2014.6 mL      CAPILLARY BLOOD GLUCOSE      POCT Blood Glucose.: 122 mg/dL (03 Jul 2022 06:28)  POCT Blood Glucose.: 126 mg/dL (03 Jul 2022 02:40)  POCT Blood Glucose.: 164 mg/dL (02 Jul 2022 22:00)  POCT Blood Glucose.: 139 mg/dL (02 Jul 2022 16:41)  POCT Blood Glucose.: 139 mg/dL (02 Jul 2022 13:52)  POCT Blood Glucose.: 136 mg/dL (02 Jul 2022 11:03)  POCT Blood Glucose.: 149 mg/dL (02 Jul 2022 09:55)  POCT Blood Glucose.: 155 mg/dL (02 Jul 2022 08:17)    Physical Exam:  General: NAD; A&Ox3  Cardiac: S1/S2, RRR, no murmur, no rubs  Lungs: unlabored very shallow respirations, b/l bs decreased at bases ? rales  Abdomen: Soft/NT/protuberant  Sternum: Intact, no click, incision healing well with no drainage  Incisions: Incisions clean/dry/intact  Extremities: No edema b/l lower extremities    Central Venous Catheter: Yes[]  critical patient   Tolliver Catheter: Yes  [] , critical patient strict I&O  BOWEL MOVEMENT:   [] NO,      LABS:                        6.3<LL>  11.17<H> )-----------( 116<L>    ( 03 Jul 2022 02:51 )             18.9<L>                        6.7<LL>  10.25 )-----------( 116<L>    ( 02 Jul 2022 06:27 )             20.2<L>    07-03    139  |  105  |  19  ----------------------------<  111<H>  3.8   |  22  |  1.0  07-02    141  |  108  |  12  ----------------------------<  99  4.2   |  22  |  0.8    Ca    8.1<L>      03 Jul 2022 02:51  Mg     2.4     07-03    TPro  5.6<L> [6.0 - 8.0]  /  Alb  4.2 [3.5 - 5.2]  /  TBili  0.4 [0.2 - 1.2]  /  DBili  x   /  AST  32 [0 - 41]  /  ALT  10 [0 - 41]  /  AlkPhos  59 [30 - 115]  07-03    PT/INR - ( 01 Jul 2022 16:52 )   PT: ;   INR: 1.21 ratio         PTT - ( 01 Jul 2022 16:52 )  PTT:25.7 sec    ABG - ( 03 Jul 2022 03:40 )  pH: 7.43  /  pCO2: 35    /  pO2: 89    / HCO3: 23    / Base Excess: -1.0  /  SaO2: 99.1  /  LA: 0.70     RADIOLOGY & ADDITIONAL TESTS:  CXR: < from: Xray Chest 1 View-PORTABLE IMMEDIATE (Xray Chest 1 View-PORTABLE IMMEDIATE .) (07.02.22 @ 16:16) >  Very small right apical pneumothorax. Left lower lobe opacity/pleural   effusion.    < end of copied text >  < from: 12 Lead ECG (07.03.22 @ 07:22) >  Ventricular Rate 114 BPM    Atrial Rate 114 BPM    P-R Interval 142 ms    QRS Duration 84 ms    Q-T Interval 348 ms    QTC Calculation(Bazett) 479 ms    P Axis 41 degrees    R Axis 45 degrees    T Axis 0 degrees    Diagnosis Line Sinus tachycardia  ST elevation consider inferolateral injury or acute infarct  ** ** ACUTE MI / STEMI ** **  Abnormal ECG  NOTIFICATION - Please notify MD and re-edit. On re-edit indicate name of  physician and date/time notified.    < end of copied text >    MEDICATIONS  (STANDING):  abacavir 600 mG/dolutegravir 50 mG/lamivudine 300 mG 1 Tablet(s) Oral daily  aMIOdarone    Tablet 200 milliGRAM(s) Oral every 8 hours  aspirin enteric coated 325 milliGRAM(s) Oral daily  atorvastatin 40 milliGRAM(s) Oral at bedtime  chlorhexidine 0.12% Liquid 15 milliLiter(s) Oral Mucosa every 12 hours  dapsone 100 milliGRAM(s) Oral daily  dextrose 50% Injectable 50 milliLiter(s) IV Push every 15 minutes  dextrose 50% Injectable 25 milliLiter(s) IV Push every 15 minutes  enoxaparin Injectable 40 milliGRAM(s) SubCutaneous every 24 hours  famotidine    Tablet 20 milliGRAM(s) Oral two times a day  famotidine    Tablet 20 milliGRAM(s) Oral every 12 hours  insulin regular Infusion 1 Unit(s)/Hr (1 mL/Hr) IV Continuous <Continuous>  lisinopril 5 milliGRAM(s) Oral daily  milrinone Infusion 0.25 MICROgram(s)/kG/Min (6.56 mL/Hr) IV Continuous <Continuous>  niCARdipine Infusion 5 mG/Hr (25 mL/Hr) IV Continuous <Continuous>  nitroglycerin  Infusion 5 MICROgram(s)/Min (1.5 mL/Hr) IV Continuous <Continuous>  nitroglycerin  Infusion 16.667 MICROgram(s)/Min (5 mL/Hr) IV Continuous <Continuous>  norepinephrine Infusion 0.05 MICROgram(s)/kG/Min (8.2 mL/Hr) IV Continuous <Continuous>  polyethylene glycol 3350 17 Gram(s) Oral daily  sodium chloride 0.9% lock flush 3 milliLiter(s) IV Push every 8 hours  sodium chloride 0.9%. 1000 milliLiter(s) (20 mL/Hr) IV Continuous <Continuous>  sulfaSALAzine 1500 milliGRAM(s) Oral two times a day    MEDICATIONS  (PRN):  acetaminophen     Tablet .. 650 milliGRAM(s) Oral every 6 hours PRN Temp greater or equal to 38C (100.4F), Mild Pain (1 - 3)  ondansetron  IVPB 4 milliGRAM(s) IV Intermittent once PRN Nausea and/or Vomiting  oxyCODONE    IR 5 milliGRAM(s) Oral every 4 hours PRN Moderate Pain (4 - 6)  oxyCODONE    IR 10 milliGRAM(s) Oral every 4 hours PRN Severe Pain (7 - 10)      Post-Op Beta-Blockers: no on milrinone     Allergies    penicillins (Hives)    Intolerances    Ambulation/Activity Status: ambulate as tolerated    Assessment/Plan:  61y Male status-post 1 s/p CABG x 2  - Case and plan discussed with CTU Intensivist and CT Surgeon - Dr. Barrera  - Continue CTU supportive care    - Continue DVT prophylaxis  - Continue GI prophylaxis  - Incentive Spirometry 10 times an hour  - Continue to advance physical activity as tolerated and continue PT/OT as directed  - CAD: Continue ASA, start statin, start BB while still on milrinone due to tachycardiac  - Anemia secondary to: Chronic Disease   and Acute PO blood loss anemia,     transfuse 2 units PRBC  - Depressed LV function continue milrinone - decrease to 0.125  - HIV continue dapsone dosing and continue antivirals  - HTN - lisinopril, add bb when off milrinone  - fluid overload secondary too: acute non cardiac fluid over load - will give lasix   - tachycardia -  Amiodarone 200mg Q8 - EKG QTC < 500   - A. Fib: prophylaxis with magnesium and amiodarone 200mg   - COPD/Hypoxia: continue duonebs, continue supplemental O2, Incentive spirometery  - DM/Glucose Control: A1c 4.7 - continue insulin drip for today one more day  - ambulate  - Pain - will add gabapentin and flexeril    Social Service Disposition:  home this week

## 2022-07-04 LAB
ALBUMIN SERPL ELPH-MCNC: 3.9 G/DL — SIGNIFICANT CHANGE UP (ref 3.5–5.2)
ALP SERPL-CCNC: 97 U/L — SIGNIFICANT CHANGE UP (ref 30–115)
ALT FLD-CCNC: 12 U/L — SIGNIFICANT CHANGE UP (ref 0–41)
ANION GAP SERPL CALC-SCNC: 11 MMOL/L — SIGNIFICANT CHANGE UP (ref 7–14)
AST SERPL-CCNC: 30 U/L — SIGNIFICANT CHANGE UP (ref 0–41)
BASOPHILS # BLD AUTO: 0.01 K/UL — SIGNIFICANT CHANGE UP (ref 0–0.2)
BASOPHILS NFR BLD AUTO: 0.1 % — SIGNIFICANT CHANGE UP (ref 0–1)
BILIRUB SERPL-MCNC: 0.6 MG/DL — SIGNIFICANT CHANGE UP (ref 0.2–1.2)
BUN SERPL-MCNC: 20 MG/DL — SIGNIFICANT CHANGE UP (ref 10–20)
CALCIUM SERPL-MCNC: 8.2 MG/DL — LOW (ref 8.5–10.1)
CHLORIDE SERPL-SCNC: 101 MMOL/L — SIGNIFICANT CHANGE UP (ref 98–110)
CO2 SERPL-SCNC: 21 MMOL/L — SIGNIFICANT CHANGE UP (ref 17–32)
CREAT SERPL-MCNC: 1 MG/DL — SIGNIFICANT CHANGE UP (ref 0.7–1.5)
EGFR: 87 ML/MIN/1.73M2 — SIGNIFICANT CHANGE UP
EOSINOPHIL # BLD AUTO: 0.03 K/UL — SIGNIFICANT CHANGE UP (ref 0–0.7)
EOSINOPHIL NFR BLD AUTO: 0.3 % — SIGNIFICANT CHANGE UP (ref 0–8)
GAS PNL BLDA: SIGNIFICANT CHANGE UP
GLUCOSE BLDC GLUCOMTR-MCNC: 116 MG/DL — HIGH (ref 70–99)
GLUCOSE BLDC GLUCOMTR-MCNC: 119 MG/DL — HIGH (ref 70–99)
GLUCOSE BLDC GLUCOMTR-MCNC: 120 MG/DL — HIGH (ref 70–99)
GLUCOSE BLDC GLUCOMTR-MCNC: 132 MG/DL — HIGH (ref 70–99)
GLUCOSE BLDC GLUCOMTR-MCNC: 136 MG/DL — HIGH (ref 70–99)
GLUCOSE BLDC GLUCOMTR-MCNC: 138 MG/DL — HIGH (ref 70–99)
GLUCOSE BLDC GLUCOMTR-MCNC: 141 MG/DL — HIGH (ref 70–99)
GLUCOSE BLDC GLUCOMTR-MCNC: 142 MG/DL — HIGH (ref 70–99)
GLUCOSE BLDC GLUCOMTR-MCNC: 155 MG/DL — HIGH (ref 70–99)
GLUCOSE BLDC GLUCOMTR-MCNC: 157 MG/DL — HIGH (ref 70–99)
GLUCOSE BLDC GLUCOMTR-MCNC: 194 MG/DL — HIGH (ref 70–99)
GLUCOSE BLDC GLUCOMTR-MCNC: 97 MG/DL — SIGNIFICANT CHANGE UP (ref 70–99)
GLUCOSE SERPL-MCNC: 138 MG/DL — HIGH (ref 70–99)
HCT VFR BLD CALC: 21.9 % — LOW (ref 42–52)
HGB BLD-MCNC: 7.4 G/DL — LOW (ref 14–18)
IMM GRANULOCYTES NFR BLD AUTO: 0.4 % — HIGH (ref 0.1–0.3)
LYMPHOCYTES # BLD AUTO: 0.93 K/UL — LOW (ref 1.2–3.4)
LYMPHOCYTES # BLD AUTO: 10.4 % — LOW (ref 20.5–51.1)
MAGNESIUM SERPL-MCNC: 2.3 MG/DL — SIGNIFICANT CHANGE UP (ref 1.8–2.4)
MCHC RBC-ENTMCNC: 28.7 PG — SIGNIFICANT CHANGE UP (ref 27–31)
MCHC RBC-ENTMCNC: 33.8 G/DL — SIGNIFICANT CHANGE UP (ref 32–37)
MCV RBC AUTO: 84.9 FL — SIGNIFICANT CHANGE UP (ref 80–94)
MONOCYTES # BLD AUTO: 0.95 K/UL — HIGH (ref 0.1–0.6)
MONOCYTES NFR BLD AUTO: 10.6 % — HIGH (ref 1.7–9.3)
NEUTROPHILS # BLD AUTO: 7.01 K/UL — HIGH (ref 1.4–6.5)
NEUTROPHILS NFR BLD AUTO: 78.2 % — HIGH (ref 42.2–75.2)
NRBC # BLD: 0 /100 WBCS — SIGNIFICANT CHANGE UP (ref 0–0)
PLATELET # BLD AUTO: 124 K/UL — LOW (ref 130–400)
POTASSIUM SERPL-MCNC: 4.3 MMOL/L — SIGNIFICANT CHANGE UP (ref 3.5–5)
POTASSIUM SERPL-SCNC: 4.3 MMOL/L — SIGNIFICANT CHANGE UP (ref 3.5–5)
PROT SERPL-MCNC: 5.5 G/DL — LOW (ref 6–8)
RBC # BLD: 2.58 M/UL — LOW (ref 4.7–6.1)
RBC # FLD: 16.2 % — HIGH (ref 11.5–14.5)
SODIUM SERPL-SCNC: 133 MMOL/L — LOW (ref 135–146)
WBC # BLD: 8.97 K/UL — SIGNIFICANT CHANGE UP (ref 4.8–10.8)
WBC # FLD AUTO: 8.97 K/UL — SIGNIFICANT CHANGE UP (ref 4.8–10.8)

## 2022-07-04 PROCEDURE — 93010 ELECTROCARDIOGRAM REPORT: CPT

## 2022-07-04 PROCEDURE — 99233 SBSQ HOSP IP/OBS HIGH 50: CPT

## 2022-07-04 PROCEDURE — 71045 X-RAY EXAM CHEST 1 VIEW: CPT | Mod: 26

## 2022-07-04 RX ORDER — SODIUM CHLORIDE 9 MG/ML
1000 INJECTION, SOLUTION INTRAVENOUS
Refills: 0 | Status: DISCONTINUED | OUTPATIENT
Start: 2022-07-04 | End: 2022-07-06

## 2022-07-04 RX ORDER — FUROSEMIDE 40 MG
40 TABLET ORAL ONCE
Refills: 0 | Status: COMPLETED | OUTPATIENT
Start: 2022-07-04 | End: 2022-07-04

## 2022-07-04 RX ORDER — INSULIN LISPRO 100/ML
VIAL (ML) SUBCUTANEOUS
Refills: 0 | Status: DISCONTINUED | OUTPATIENT
Start: 2022-07-04 | End: 2022-07-06

## 2022-07-04 RX ORDER — GLUCAGON INJECTION, SOLUTION 0.5 MG/.1ML
1 INJECTION, SOLUTION SUBCUTANEOUS ONCE
Refills: 0 | Status: DISCONTINUED | OUTPATIENT
Start: 2022-07-04 | End: 2022-07-06

## 2022-07-04 RX ORDER — INSULIN LISPRO 100/ML
7 VIAL (ML) SUBCUTANEOUS
Refills: 0 | Status: DISCONTINUED | OUTPATIENT
Start: 2022-07-04 | End: 2022-07-06

## 2022-07-04 RX ORDER — POTASSIUM CHLORIDE 20 MEQ
20 PACKET (EA) ORAL ONCE
Refills: 0 | Status: COMPLETED | OUTPATIENT
Start: 2022-07-04 | End: 2022-07-04

## 2022-07-04 RX ORDER — DEXTROSE 50 % IN WATER 50 %
15 SYRINGE (ML) INTRAVENOUS ONCE
Refills: 0 | Status: DISCONTINUED | OUTPATIENT
Start: 2022-07-04 | End: 2022-07-06

## 2022-07-04 RX ORDER — INSULIN GLARGINE 100 [IU]/ML
30 INJECTION, SOLUTION SUBCUTANEOUS EVERY MORNING
Refills: 0 | Status: DISCONTINUED | OUTPATIENT
Start: 2022-07-04 | End: 2022-07-06

## 2022-07-04 RX ADMIN — GABAPENTIN 300 MILLIGRAM(S): 400 CAPSULE ORAL at 06:31

## 2022-07-04 RX ADMIN — GABAPENTIN 300 MILLIGRAM(S): 400 CAPSULE ORAL at 21:50

## 2022-07-04 RX ADMIN — AMIODARONE HYDROCHLORIDE 200 MILLIGRAM(S): 400 TABLET ORAL at 06:30

## 2022-07-04 RX ADMIN — OXYCODONE HYDROCHLORIDE 10 MILLIGRAM(S): 5 TABLET ORAL at 11:20

## 2022-07-04 RX ADMIN — CHLORHEXIDINE GLUCONATE 15 MILLILITER(S): 213 SOLUTION TOPICAL at 06:31

## 2022-07-04 RX ADMIN — ATORVASTATIN CALCIUM 40 MILLIGRAM(S): 80 TABLET, FILM COATED ORAL at 21:50

## 2022-07-04 RX ADMIN — Medication 25 MILLIGRAM(S): at 18:07

## 2022-07-04 RX ADMIN — AMIODARONE HYDROCHLORIDE 200 MILLIGRAM(S): 400 TABLET ORAL at 21:50

## 2022-07-04 RX ADMIN — Medication 7 UNIT(S): at 16:41

## 2022-07-04 RX ADMIN — Medication 1500 MILLIGRAM(S): at 06:29

## 2022-07-04 RX ADMIN — CLOPIDOGREL BISULFATE 75 MILLIGRAM(S): 75 TABLET, FILM COATED ORAL at 11:41

## 2022-07-04 RX ADMIN — OXYCODONE HYDROCHLORIDE 10 MILLIGRAM(S): 5 TABLET ORAL at 10:50

## 2022-07-04 RX ADMIN — Medication 325 MILLIGRAM(S): at 11:42

## 2022-07-04 RX ADMIN — CHLORHEXIDINE GLUCONATE 15 MILLILITER(S): 213 SOLUTION TOPICAL at 21:49

## 2022-07-04 RX ADMIN — GABAPENTIN 300 MILLIGRAM(S): 400 CAPSULE ORAL at 13:40

## 2022-07-04 RX ADMIN — Medication 40 MILLIGRAM(S): at 09:29

## 2022-07-04 RX ADMIN — FAMOTIDINE 20 MILLIGRAM(S): 10 INJECTION INTRAVENOUS at 18:07

## 2022-07-04 RX ADMIN — Medication 1500 MILLIGRAM(S): at 18:07

## 2022-07-04 RX ADMIN — OXYCODONE HYDROCHLORIDE 10 MILLIGRAM(S): 5 TABLET ORAL at 22:19

## 2022-07-04 RX ADMIN — OXYCODONE HYDROCHLORIDE 10 MILLIGRAM(S): 5 TABLET ORAL at 22:49

## 2022-07-04 RX ADMIN — ENOXAPARIN SODIUM 40 MILLIGRAM(S): 100 INJECTION SUBCUTANEOUS at 06:30

## 2022-07-04 RX ADMIN — OXYCODONE HYDROCHLORIDE 10 MILLIGRAM(S): 5 TABLET ORAL at 01:45

## 2022-07-04 RX ADMIN — DAPSONE 100 MILLIGRAM(S): 100 TABLET ORAL at 11:45

## 2022-07-04 RX ADMIN — INSULIN GLARGINE 30 UNIT(S): 100 INJECTION, SOLUTION SUBCUTANEOUS at 11:34

## 2022-07-04 RX ADMIN — Medication 2: at 16:41

## 2022-07-04 RX ADMIN — Medication 7 UNIT(S): at 11:57

## 2022-07-04 RX ADMIN — FAMOTIDINE 20 MILLIGRAM(S): 10 INJECTION INTRAVENOUS at 06:29

## 2022-07-04 RX ADMIN — POLYETHYLENE GLYCOL 3350 17 GRAM(S): 17 POWDER, FOR SOLUTION ORAL at 11:41

## 2022-07-04 RX ADMIN — LISINOPRIL 5 MILLIGRAM(S): 2.5 TABLET ORAL at 06:29

## 2022-07-04 RX ADMIN — Medication 20 MILLIEQUIVALENT(S): at 09:29

## 2022-07-04 RX ADMIN — AMIODARONE HYDROCHLORIDE 200 MILLIGRAM(S): 400 TABLET ORAL at 13:40

## 2022-07-04 RX ADMIN — Medication 25 MILLIGRAM(S): at 06:31

## 2022-07-04 NOTE — PROGRESS NOTE ADULT - SUBJECTIVE AND OBJECTIVE BOX
CTU Attending Progress Daily Note     04 Jul 2022 11:17  Admited 06-29-22, Hospital Day 5d  POD# - 3    HPI:  61 y/old M here for [facility] due to chest pain, + CT ANGIO HEART CORONARY ( CS = 2032 )  PMH: HIV, Formal drug user, RA, GERD, Hep C, Thrombocytopenia, Esophageal spasm                  Home Medications:  celecoxib 200 mg oral capsule: 1 cap(s) orally once a day (29 Jun 2022 11:51)  dapsone 100 mg oral tablet: orally 3 times a week m w f  (29 Jun 2022 11:51)  hyoscyamine 0.375 mg oral capsule, extended release: 1 tab(s) orally (29 Jun 2022 11:51)  lisinopril 30 mg oral tablet: 1 tab(s) orally once a day (29 Jun 2022 11:51)  Pepcid 40 mg oral tablet: 1 tab(s) orally once a day (at bedtime) (29 Jun 2022 11:51)  sulfaSALAzine 500 mg oral delayed release tablet: 2 tab(s) orally 2 times a day (29 Jun 2022 11:51)  Triumeq oral tablet: 1 tab(s) orally once a day 600/50/300 mg po takes after meal (29 Jun 2022 11:51)    FAMILY HISTORY:  CAD (coronary artery disease) (Mother)    HTN (hypertension) (Mother, Father)      PAST MEDICAL & SURGICAL HISTORY:  HIV (human immunodeficiency virus infection)  x 25 yrs      Hepatitis C, chronic  dx 2yr no tx per pt      Thrombocytopenia  2 yr plt ct low 32 high 150      History of surgery  ls laminectomy  97 98      History of surgery  rt elbow sx child  bone spur      History of surgery  lt hip i and d 24 yrs      Interval event for past 24 hr:  CHET DAY  61y had no event.   Current Complains:  CHET DAY has no new complains  Allergies    penicillins (Hives)    Intolerances      OBJECTIVE:  Vitals last 24 hrs  T(C): 37.6 (07-04-22 @ 08:00), Max: 38 (07-03-22 @ 12:00)  T(F): 99.7 (07-04-22 @ 08:00), Max: 100.4 (07-03-22 @ 12:00)  HR: 88 (07-04-22 @ 11:00) (83 - 111)  BP: 121/59 (07-04-22 @ 11:00) (119/62 - 121/59)  ABP: 129/47 (07-04-22 @ 09:00) (112/47 - 148/47)  ABP(mean): 69 (07-04-22 @ 09:00) (60 - 82)  RR: 29 (07-04-22 @ 11:00) (11 - 35)  SpO2: 98% (07-04-22 @ 11:00) (92% - 98%)      07-03-22 @ 07:01  -  07-04-22 @ 07:00  --------------------------------------------------------  IN:    Insulin: 70 mL    Nitroglycerin: 725 mL    Oral Fluid: 480 mL    sodium chloride 0.9%: 480 mL  Total IN: 1755 mL    OUT:    Indwelling Catheter - Urethral (mL): 1400 mL  Total OUT: 1400 mL    Total NET: 355 mL             CAPILLARY BLOOD GLUCOSE      POCT Blood Glucose.: 194 mg/dL (04 Jul 2022 10:06)  POCT Blood Glucose.: 120 mg/dL (04 Jul 2022 08:06)  POCT Blood Glucose.: 116 mg/dL (04 Jul 2022 06:41)  POCT Blood Glucose.: 132 mg/dL (04 Jul 2022 03:00)  POCT Blood Glucose.: 142 mg/dL (04 Jul 2022 01:14)  POCT Blood Glucose.: 130 mg/dL (03 Jul 2022 21:46)  POCT Blood Glucose.: 117 mg/dL (03 Jul 2022 18:21)  POCT Blood Glucose.: 103 mg/dL (03 Jul 2022 13:02)    LABS:  ABG - ( 04 Jul 2022 04:10 )  pH, Arterial: 7.42  pH, Blood: x     /  pCO2: 40    /  pO2: 91    / HCO3: 26    / Base Excess: 1.3   /  SaO2: 98.3            Blood Gas Arterial, Lactate: 0.70 mmol/L (07-04-22 @ 04:10)                          7.4    8.97  )-----------( 124      ( 04 Jul 2022 01:20 )             21.9     Hemoglobin: 7.4 g/dL (07-04 @ 01:20)  Hemoglobin: 6.3 g/dL (07-03 @ 02:51)  Hemoglobin: 6.7 g/dL (07-02 @ 06:27)  Hemoglobin: 6.6 g/dL (07-02 @ 01:30)  Hemoglobin: 8.4 g/dL (07-01 @ 16:52)    07-04    133<L>  |  101  |  20  ----------------------------<  138<H>  4.3   |  21  |  1.0    Ca    8.2<L>      04 Jul 2022 01:20  Mg     2.3     07-04    TPro  5.5<L>  /  Alb  3.9  /  TBili  0.6  /  DBili  x   /  AST  30  /  ALT  12  /  AlkPhos  97  07-04    Creatinine, Serum: 1.0 mg/dL (07-04 @ 01:20)  Creatinine, Serum: 1.0 mg/dL (07-03 @ 02:51)  Creatinine, Serum: 0.8 mg/dL (07-02 @ 01:30)  Creatinine, Serum: 0.7 mg/dL (07-01 @ 16:52)  Creatinine, Serum: 0.8 mg/dL (06-30 @ 17:22)          HOSPITAL MEDICATIONS:  MEDICATIONS  (STANDING):  abacavir 600 mG/dolutegravir 50 mG/lamivudine 300 mG 1 Tablet(s) Oral daily  aMIOdarone    Tablet 200 milliGRAM(s) Oral every 8 hours  aspirin enteric coated 325 milliGRAM(s) Oral daily  atorvastatin 40 milliGRAM(s) Oral at bedtime  chlorhexidine 0.12% Liquid 15 milliLiter(s) Oral Mucosa every 12 hours  clopidogrel Tablet 75 milliGRAM(s) Oral daily  dapsone 100 milliGRAM(s) Oral daily  dextrose 5%. 1000 milliLiter(s) (50 mL/Hr) IV Continuous <Continuous>  dextrose 5%. 1000 milliLiter(s) (100 mL/Hr) IV Continuous <Continuous>  dextrose 50% Injectable 50 milliLiter(s) IV Push every 15 minutes  dextrose 50% Injectable 25 milliLiter(s) IV Push every 15 minutes  enoxaparin Injectable 40 milliGRAM(s) SubCutaneous every 24 hours  famotidine    Tablet 20 milliGRAM(s) Oral two times a day  famotidine    Tablet 20 milliGRAM(s) Oral every 12 hours  gabapentin 300 milliGRAM(s) Oral every 8 hours  glucagon  Injectable 1 milliGRAM(s) IntraMuscular once  insulin glargine Injectable (LANTUS) 30 Unit(s) SubCutaneous every morning  insulin lispro (ADMELOG) corrective regimen sliding scale   SubCutaneous three times a day before meals  insulin lispro Injectable (ADMELOG) 7 Unit(s) SubCutaneous three times a day before meals  insulin regular Infusion 1 Unit(s)/Hr (1 mL/Hr) IV Continuous <Continuous>  lisinopril 5 milliGRAM(s) Oral daily  metoprolol tartrate 25 milliGRAM(s) Oral every 12 hours  niCARdipine Infusion 5 mG/Hr (25 mL/Hr) IV Continuous <Continuous>  nitroglycerin  Infusion 5 MICROgram(s)/Min (1.5 mL/Hr) IV Continuous <Continuous>  nitroglycerin  Infusion 16.667 MICROgram(s)/Min (5 mL/Hr) IV Continuous <Continuous>  polyethylene glycol 3350 17 Gram(s) Oral daily  sodium chloride 0.9%. 1000 milliLiter(s) (20 mL/Hr) IV Continuous <Continuous>  sulfaSALAzine 1500 milliGRAM(s) Oral two times a day    MEDICATIONS  (PRN):  acetaminophen     Tablet .. 650 milliGRAM(s) Oral every 6 hours PRN Temp greater or equal to 38C (100.4F), Mild Pain (1 - 3)  dextrose Oral Gel 15 Gram(s) Oral once PRN Blood Glucose LESS THAN 70 milliGRAM(s)/deciliter  ondansetron  IVPB 4 milliGRAM(s) IV Intermittent once PRN Nausea and/or Vomiting  oxyCODONE    IR 5 milliGRAM(s) Oral every 4 hours PRN Moderate Pain (4 - 6)  oxyCODONE    IR 10 milliGRAM(s) Oral every 4 hours PRN Severe Pain (7 - 10)      REVIEW OF SYSTEMS:  CONSTITUTIONAL: [X] all negative; [ ] weakness, [ ] fevers, [ ] chills  EYES/ENT: [X] all negative; [ ] visual changes, [ ] vertigo, [ ] throat pain, [ ] eye pain  NECK: [X] all negative; [ ] pain, [ ] stiffness  RESPIRATORY: [ ] all negative, [x ] cough, [ ] wheezing, [ ] hemoptysis, [ ] shortness of breath, [ x ] chest pain  CARDIOVASCULAR: [X] all negative; [ ] anginal chest pain, [ ] palpitations, [ ] orthopnea  GASTROINTESTINAL: [X] all negative; [ ]abdominal pain, [ ] nausea, [ ] vomiting, [ ] hematemesis, [ ] diarrhea, [ ] constipation, [ ] melena, [ ] hematochezia.  GENITOURINARY: [X] all negative; [ ] dysuria, [ ] frequency, [ ] hematuria  NEUROLOGICAL: [X] all negative; [ ] numbness, [ ] weakness, [ ] paresthesias  MUSCULOSKELETAL: [X] all negative, [ ] joint pain, [ ] joint swelling, [ ] joint redness, [ ] bone pain  SKIN: [X] all negative; [ ] itching, [ ] burning, [ ] rashes, [ ] lesions   All other review of systems is negative unless indicated above.    [  ] Unable to assess ROS because     PHYSICAL EXAM:          CONSTITUTIONAL: Well-developed; well-nourished; in no acute distress.   	SKIN: warm, dry, no rashes or lesions  	HEENT: Atraumatic. Normocephalic. PERRL. Moist membranes, no conjunctival injection, sclera clear  	NECK: Supple; non tender.  No JVD. No lymphadenopathy.  	CARD: Normal S1, S2. Rate and Rhythm are regular. No murmurs.  	RESP: Good air entry bilaterally, no wheezes, + rales no rhonchi.  	ABD: Soft, not tender, not distended, no CVA tenderness, no rebound no guarding, bowel sounds present  	EXT: Normal ROM.  No clubbing, no cyanosis, no pedal edema, no calf pain b/l, Peripheral pulses intact.  	LYMPH: No acute adenopathy.  	NEURO: Alert, awake, motor 5/5 R, 5/5 L, sensation intact bilat, CN 2-12 intact,          PSYCH: Cooperative, appropriate. Alert & oriented x 3    RADIOLOGY:  X Reviewed and interpreted by me  CxR from 07-04-22 shows moderate congestion, no pneumothorax, no effusion, no cardiomegaly,       ECG:  X Reviewed and interpreted by me NSR 90, QTc - 462

## 2022-07-04 NOTE — PROGRESS NOTE ADULT - SUBJECTIVE AND OBJECTIVE BOX
OPERATIVE PROCEDURE(s):                POD #  3 CABG x 4                     61yMale  SURGEON(s): SHANE Barrera  SUBJECTIVE ASSESSMENT: incisional pain    Vital Signs Last 24 Hrs  T(F): 99.9 (04 Jul 2022 04:00), Max: 100.4 (03 Jul 2022 12:00)  HR: 90 (04 Jul 2022 07:00) (85 - 115)  ABP: 146/49 (04 Jul 2022 07:00) (112/47 - 146/60)  ABP(mean): 73 (04 Jul 2022 07:00)  RR: 22 (04 Jul 2022 07:00) (11 - 35)  SpO2: 95% (04 Jul 2022 07:00) (92% - 98%)    I&O's Detail    03 Jul 2022 07:01  -  04 Jul 2022 07:00  --------------------------------------------------------  IN:    Insulin: 70 mL    Nitroglycerin: 725 mL    Oral Fluid: 480 mL    sodium chloride 0.9%: 480 mL  Total IN: 1755 mL    OUT:    Indwelling Catheter - Urethral (mL): 1400 mL  Total OUT: 1400 mL    Net:   I&O's Detail    02 Jul 2022 07:01  -  03 Jul 2022 07:00  --------------------------------------------------------  Total NET: 2014.6 mL    03 Jul 2022 07:01  -  04 Jul 2022 07:00  --------------------------------------------------------  Total NET: 355 mL  CAPILLARY BLOOD GLUCOSE      POCT Blood Glucose.: 116 mg/dL (04 Jul 2022 06:41)  POCT Blood Glucose.: 132 mg/dL (04 Jul 2022 03:00)  POCT Blood Glucose.: 142 mg/dL (04 Jul 2022 01:14)  POCT Blood Glucose.: 130 mg/dL (03 Jul 2022 21:46)  POCT Blood Glucose.: 117 mg/dL (03 Jul 2022 18:21)  POCT Blood Glucose.: 103 mg/dL (03 Jul 2022 13:02)    Physical Exam:  General: NAD; A&Ox3  Cardiac: S1/S2, RRR, no murmur, no rubs  Lungs: unlabored very shallow respirations, b/l bs decreased at bases L>R  Abdomen: Soft/NT/protuberant  Sternum: Intact, no click, incision healing well with no drainage  Incisions: Incisions clean/dry/intact  Extremities: No edema b/l lower extremities    Central Venous Catheter: Yes[]  critical patient   Tolliver Catheter: Yes  [] , critical patient strict I&O  BOWEL MOVEMENT:   [] NO,      LABS:                        7.4<L>  8.97  )-----------( 124<L>    ( 04 Jul 2022 01:20 )             21.9<L>                        6.3<LL>  11.17<H> )-----------( 116<L>    ( 03 Jul 2022 02:51 )             18.9<L>    07-04    133<L>  |  101  |  20  ----------------------------<  138<H>  4.3   |  21  |  1.0  07-03    139  |  105  |  19  ----------------------------<  111<H>  3.8   |  22  |  1.0    Ca    8.2<L>      04 Jul 2022 01:20  Mg     2.3     07-04    TPro  5.5<L> [6.0 - 8.0]  /  Alb  3.9 [3.5 - 5.2]  /  TBili  0.6 [0.2 - 1.2]  /  DBili  x   /  AST  30 [0 - 41]  /  ALT  12 [0 - 41]  /  AlkPhos  97 [30 - 115]  07-04    ABG - ( 04 Jul 2022 04:10 )  pH: 7.42  /  pCO2: 40    /  pO2: 91    / HCO3: 26    / Base Excess: 1.3   /  SaO2: 98.3  /  LA: 0.70       RADIOLOGY & ADDITIONAL TESTS:  CXR: < from: Xray Chest 1 View- PORTABLE-Routine (07.04.22 @ 05:58) >  LUNG PARENCHYMA/PLEURA:Bilateral lower lobe opacities, left greater than   right.    SKELETON/SOFT TISSUES: Unchanged.      IMPRESSION:    Unchanged bilateral lower lobe opacities, left greater than right.    < end of copied text >    EKG:  MEDICATIONS  (STANDING):  abacavir 600 mG/dolutegravir 50 mG/lamivudine 300 mG 1 Tablet(s) Oral daily  aMIOdarone    Tablet 200 milliGRAM(s) Oral every 8 hours  aspirin enteric coated 325 milliGRAM(s) Oral daily  atorvastatin 40 milliGRAM(s) Oral at bedtime  chlorhexidine 0.12% Liquid 15 milliLiter(s) Oral Mucosa every 12 hours  clopidogrel Tablet 75 milliGRAM(s) Oral daily  dapsone 100 milliGRAM(s) Oral daily  dextrose 50% Injectable 50 milliLiter(s) IV Push every 15 minutes  dextrose 50% Injectable 25 milliLiter(s) IV Push every 15 minutes  enoxaparin Injectable 40 milliGRAM(s) SubCutaneous every 24 hours  famotidine    Tablet 20 milliGRAM(s) Oral two times a day  famotidine    Tablet 20 milliGRAM(s) Oral every 12 hours  gabapentin 300 milliGRAM(s) Oral every 8 hours  insulin regular Infusion 1 Unit(s)/Hr (1 mL/Hr) IV Continuous <Continuous>  lisinopril 5 milliGRAM(s) Oral daily  metoprolol tartrate 25 milliGRAM(s) Oral every 12 hours  niCARdipine Infusion 5 mG/Hr (25 mL/Hr) IV Continuous <Continuous>  nitroglycerin  Infusion 5 MICROgram(s)/Min (1.5 mL/Hr) IV Continuous <Continuous>  nitroglycerin  Infusion 16.667 MICROgram(s)/Min (5 mL/Hr) IV Continuous <Continuous>  polyethylene glycol 3350 17 Gram(s) Oral daily  sodium chloride 0.9%. 1000 milliLiter(s) (20 mL/Hr) IV Continuous <Continuous>  sulfaSALAzine 1500 milliGRAM(s) Oral two times a day    MEDICATIONS  (PRN):  acetaminophen     Tablet .. 650 milliGRAM(s) Oral every 6 hours PRN Temp greater or equal to 38C (100.4F), Mild Pain (1 - 3)  ondansetron  IVPB 4 milliGRAM(s) IV Intermittent once PRN Nausea and/or Vomiting  oxyCODONE    IR 5 milliGRAM(s) Oral every 4 hours PRN Moderate Pain (4 - 6)  oxyCODONE    IR 10 milliGRAM(s) Oral every 4 hours PRN Severe Pain (7 - 10)    Allergies    penicillins (Hives)    Intolerances      Ambulation/Activity Status: ambulate as tolerated    Assessment/Plan:  61y Male status-post s/p CABG  day 3  - Case and plan discussed with CTU Intensivist and CT Surgeon - Dr. Barrera  - Continue CTU supportive care    - Continue DVT prophylaxis  - Continue GI prophylaxis  - Incentive Spirometry 10 times an hour  - Continue to advance physical activity as tolerated and continue PT/OT as directed  - CAD: Continue ASA, start statin, started BB, milrinone off, started Plavix  - Anemia secondary to: Chronic Disease   and Acute PO blood loss anemia,     transfused 2 units PRBC  - Depressed LV function milrinone weaned off  - patient stable  - HIV continue dapsone dosing and continue antivirals  - HTN - lisinopri and BB  - fluid overload secondary too: acute non cardiac fluid over load - will give lasix   - tachycardia -  Amiodarone 200mg Q8 - EKG QTC < 500 will recheck today, HR less 100 last 12 hours  - A. Fib: prophylaxis with magnesium and amiodarone 200mg   - COPD/Hypoxia: continue duonebs, continue supplemental O2, Incentive spirometry  - DM/Glucose Control: A1c 4.7 - continue insulin drip for today one more day received 70 units yesterday  - ambulate  - Pain - added gabapentin and flexeril - has helped a little    Social Service Disposition:  home next 48-72 hours   OPERATIVE PROCEDURE(s):                POD #  3 CABG x 4                     61yMale  SURGEON(s): SHANE Barrera  SUBJECTIVE ASSESSMENT: incisional pain    Vital Signs Last 24 Hrs  T(F): 99.9 (04 Jul 2022 04:00), Max: 100.4 (03 Jul 2022 12:00)  HR: 90 (04 Jul 2022 07:00) (85 - 115)  ABP: 146/49 (04 Jul 2022 07:00) (112/47 - 146/60)  ABP(mean): 73 (04 Jul 2022 07:00)  RR: 22 (04 Jul 2022 07:00) (11 - 35)  SpO2: 95% (04 Jul 2022 07:00) (92% - 98%)    I&O's Detail    03 Jul 2022 07:01  -  04 Jul 2022 07:00  --------------------------------------------------------  IN:    Insulin: 70 mL    Nitroglycerin: 725 mL    Oral Fluid: 480 mL    sodium chloride 0.9%: 480 mL  Total IN: 1755 mL    OUT:    Indwelling Catheter - Urethral (mL): 1400 mL  Total OUT: 1400 mL    Net:   I&O's Detail    02 Jul 2022 07:01  -  03 Jul 2022 07:00  --------------------------------------------------------  Total NET: 2014.6 mL    03 Jul 2022 07:01  -  04 Jul 2022 07:00  --------------------------------------------------------  Total NET: 355 mL  CAPILLARY BLOOD GLUCOSE      POCT Blood Glucose.: 116 mg/dL (04 Jul 2022 06:41)  POCT Blood Glucose.: 132 mg/dL (04 Jul 2022 03:00)  POCT Blood Glucose.: 142 mg/dL (04 Jul 2022 01:14)  POCT Blood Glucose.: 130 mg/dL (03 Jul 2022 21:46)  POCT Blood Glucose.: 117 mg/dL (03 Jul 2022 18:21)  POCT Blood Glucose.: 103 mg/dL (03 Jul 2022 13:02)    Physical Exam:  General: NAD; A&Ox3  Cardiac: S1/S2, RRR, no murmur, no rubs  Lungs: unlabored very shallow respirations, b/l bs decreased at bases L>R  Abdomen: Soft/NT/protuberant  Sternum: Intact, no click, incision healing well with no drainage  Incisions: Incisions clean/dry/intact  Extremities: No edema b/l lower extremities    Central Venous Catheter: Yes[]  critical patient   Tolliver Catheter: Yes  [] , critical patient strict I&O  BOWEL MOVEMENT:   [] NO,      LABS:                        7.4<L>  8.97  )-----------( 124<L>    ( 04 Jul 2022 01:20 )             21.9<L>                        6.3<LL>  11.17<H> )-----------( 116<L>    ( 03 Jul 2022 02:51 )             18.9<L>    07-04    133<L>  |  101  |  20  ----------------------------<  138<H>  4.3   |  21  |  1.0  07-03    139  |  105  |  19  ----------------------------<  111<H>  3.8   |  22  |  1.0    Ca    8.2<L>      04 Jul 2022 01:20  Mg     2.3     07-04    TPro  5.5<L> [6.0 - 8.0]  /  Alb  3.9 [3.5 - 5.2]  /  TBili  0.6 [0.2 - 1.2]  /  DBili  x   /  AST  30 [0 - 41]  /  ALT  12 [0 - 41]  /  AlkPhos  97 [30 - 115]  07-04    ABG - ( 04 Jul 2022 04:10 )  pH: 7.42  /  pCO2: 40    /  pO2: 91    / HCO3: 26    / Base Excess: 1.3   /  SaO2: 98.3  /  LA: 0.70       RADIOLOGY & ADDITIONAL TESTS:  CXR: < from: Xray Chest 1 View- PORTABLE-Routine (07.04.22 @ 05:58) >  LUNG PARENCHYMA/PLEURA:Bilateral lower lobe opacities, left greater than   right.    SKELETON/SOFT TISSUES: Unchanged.      IMPRESSION:    Unchanged bilateral lower lobe opacities, left greater than right.    < end of copied text >    EKG: < from: 12 Lead ECG (07.04.22 @ 07:09) >    Ventricular Rate 90 BPM    Atrial Rate 90 BPM    P-R Interval 144 ms    QRS Duration 94 ms    Q-T Interval 378 ms    QTC Calculation(Bazett) 462 ms    P Axis 50 degrees    R Axis 41 degrees    T Axis 25 degrees    Diagnosis Line Normal sinus rhythm  ST elevation consider inferolateral injury or acute infarct  ** ** ACUTE MI / STEMI ** **  Abnormal ECG    < end of copied text >    MEDICATIONS  (STANDING):  abacavir 600 mG/dolutegravir 50 mG/lamivudine 300 mG 1 Tablet(s) Oral daily  aMIOdarone    Tablet 200 milliGRAM(s) Oral every 8 hours  aspirin enteric coated 325 milliGRAM(s) Oral daily  atorvastatin 40 milliGRAM(s) Oral at bedtime  chlorhexidine 0.12% Liquid 15 milliLiter(s) Oral Mucosa every 12 hours  clopidogrel Tablet 75 milliGRAM(s) Oral daily  dapsone 100 milliGRAM(s) Oral daily  dextrose 50% Injectable 50 milliLiter(s) IV Push every 15 minutes  dextrose 50% Injectable 25 milliLiter(s) IV Push every 15 minutes  enoxaparin Injectable 40 milliGRAM(s) SubCutaneous every 24 hours  famotidine    Tablet 20 milliGRAM(s) Oral two times a day  famotidine    Tablet 20 milliGRAM(s) Oral every 12 hours  gabapentin 300 milliGRAM(s) Oral every 8 hours  insulin regular Infusion 1 Unit(s)/Hr (1 mL/Hr) IV Continuous <Continuous>  lisinopril 5 milliGRAM(s) Oral daily  metoprolol tartrate 25 milliGRAM(s) Oral every 12 hours  niCARdipine Infusion 5 mG/Hr (25 mL/Hr) IV Continuous <Continuous>  nitroglycerin  Infusion 5 MICROgram(s)/Min (1.5 mL/Hr) IV Continuous <Continuous>  nitroglycerin  Infusion 16.667 MICROgram(s)/Min (5 mL/Hr) IV Continuous <Continuous>  polyethylene glycol 3350 17 Gram(s) Oral daily  sodium chloride 0.9%. 1000 milliLiter(s) (20 mL/Hr) IV Continuous <Continuous>  sulfaSALAzine 1500 milliGRAM(s) Oral two times a day    MEDICATIONS  (PRN):  acetaminophen     Tablet .. 650 milliGRAM(s) Oral every 6 hours PRN Temp greater or equal to 38C (100.4F), Mild Pain (1 - 3)  ondansetron  IVPB 4 milliGRAM(s) IV Intermittent once PRN Nausea and/or Vomiting  oxyCODONE    IR 5 milliGRAM(s) Oral every 4 hours PRN Moderate Pain (4 - 6)  oxyCODONE    IR 10 milliGRAM(s) Oral every 4 hours PRN Severe Pain (7 - 10)    Allergies    penicillins (Hives)    Intolerances      Ambulation/Activity Status: ambulate as tolerated    Assessment/Plan:  61y Male status-post s/p CABG  day 3  - Case and plan discussed with CTU Intensivist and CT Surgeon - Dr. Barrera  - Continue CTU supportive care    - Continue DVT prophylaxis  - Continue GI prophylaxis  - Incentive Spirometry 10 times an hour  - Continue to advance physical activity as tolerated and continue PT/OT as directed  - CAD: Continue ASA, start statin, started BB, milrinone off, started Plavix  - Anemia secondary to: Chronic Disease   and Acute PO blood loss anemia,     transfused 2 units PRBC    - Depressed LV function milrinone weaned off  - patient stable  - HIV continue dapsone dosing and continue antivirals  - HTN - lisinopri and BB  - fluid overload secondary too: acute non cardiac fluid over load - will give lasix  and potassium  - tachycardia -  Amiodarone 200mg Q8 - EKG QTC < 500 , HR less 100 last 12 hours, will start motrin for mild  ST elevation with low grade fever (early pericarditis)  - A. Fib: prophylaxis with magnesium and amiodarone 200mg   - COPD/Hypoxia: continue duonebs, continue supplemental O2, Incentive spirometry  - DM/Glucose Control: A1c 4.7 - continue insulin drip for today one more day received 70 units yesterday and start Lantus 30 and Lispro 7  - ambulate  - Pain - added gabapentin and flexeril - has helped   - remove arterial line and [place]    Social Service Disposition:  home next 48-72 hours

## 2022-07-04 NOTE — PROGRESS NOTE ADULT - ASSESSMENT
PROBLEMS:  I spent 45 minutes of critical care time examining patient, reviewing vitals, labs, medications, imaging and discussing with the team goals of care to prevent life-threatening in this patient who is at high risk for deterioration or death due to:            Case and plan discussed with CT Surgeons and CTU PAs.  Continue CTU supportive care and ongoing plan of care as per continuing CTU rounds.   Continue DVT/GI prophylaxis.  Incentive Spirometry 10 times an hour.  Continue to advance physical activity as tolerated and continue PT/OT as directed.    PLAN  Neuro: move all 4 extremities. no sensory or motor deficits  Pain management.   gabapentin 300 milliGRAM(s) Oral every 8 hours  oxyCODONE    IR 10 milliGRAM(s) Oral every 4 hours PRN    Pulm: Wean off supplemental oxygen as able. Daily CXR. Encourage coughing, deep breathing and use of incentive spirometry.     Cardio: Monitor telemetry/alarms. Continue supportive care   aMIOdarone    Tablet 200 milliGRAM(s) Oral every 8 hours  lisinopril 5 milliGRAM(s) Oral daily  metoprolol tartrate 25 milliGRAM(s) Oral every 12 hours  nitroglycerin  Infusion 16.667 MICROgram(s)/Min IV Continuous <Continuous>    GI: Continue stool softeners.    famotidine    Tablet 20 milliGRAM(s) Oral two times a day  sulfaSALAzine 1500 milliGRAM(s) Oral two times a day    Nutrition: Continue present diet  Endocrine and glucose control:   atorvastatin 40 milliGRAM(s) Oral at bedtime  dextrose Oral Gel 15 Gram(s) Oral once PRN  glucagon  Injectable 1 milliGRAM(s) IntraMuscular once  insulin glargine Injectable (LANTUS) 30 Unit(s) SubCutaneous every morning  insulin lispro (ADMELOG) corrective regimen sliding scale   SubCutaneous three times a day before meals  insulin lispro Injectable (ADMELOG) 7 Unit(s) SubCutaneous three times a day before meals    Renal: monitor urine output, supplement electrolytes as needed,     Vasc: Heparin SC and/or SCDs for DVT prophylaxis  clopidogrel Tablet 75 milliGRAM(s) Oral daily    ID: Stable, no fever , no chills. Off antibiotics.  abacavir 600 mG/dolutegravir 50 mG/lamivudine 300 mG 1 Tablet(s) Oral daily  dapsone 100 milliGRAM(s) Oral daily    Tubes: Monitor Chest tube output  Therapy: OOB/ambulate  Disposition: start planing discharge home or placement    Pertinent clinical, laboratory, radiographic, hemodynamic, echocardiographic, respiratory data, microbiologic data and chart were reviewed and analyzed frequently throughout the course of the day and night. GI and DVT prophylaxis, glycemic control, head of bed elevation and skin care issues were addressed.  Patient seen, examined and plan discussed with CT Surgery / CTICU team during rounds.    [ ] The patient remains in critical and unstable condition, and requires ICU care and monitoring  [ ] The patient is improving but requires continued monitoring and adjustment of therapy PROBLEMS:  I spent 45 minutes of critical care time examining patient, reviewing vitals, labs, medications, imaging and discussing with the team goals of care to prevent life-threatening in this patient who is at high risk for deterioration or death due to:      CAD - s/p CABG , Lipitor 40, lopressor 25 q 12, sc lovenox 40  HFrEF acute on chronic - off Milrinone drip - Lasix  40 IV  Tachycardia - start Amiodarone 200 q 8  HTN - lisinopril 5  DM - continue insulin drip start Lantus 30 and Lispro 7  Anemia post op due to acute blood loss  - diuresis - and monitor  thrombocytopenia - monitor   D/c A. Line and Tolliver   Pain - Added Gabapentin 300 and cyclobenzaprine for 3 doses        Case and plan discussed with CT Surgeons and CTU PAs.  Continue CTU supportive care and ongoing plan of care as per continuing CTU rounds.   Continue DVT/GI prophylaxis.  Incentive Spirometry 10 times an hour.  Continue to advance physical activity as tolerated and continue PT/OT as directed.    PLAN  Neuro: move all 4 extremities. no sensory or motor deficits  Pain management.   gabapentin 300 milliGRAM(s) Oral every 8 hours  oxyCODONE    IR 10 milliGRAM(s) Oral every 4 hours PRN    Pulm: Wean off supplemental oxygen as able. Daily CXR. Encourage coughing, deep breathing and use of incentive spirometry.     Cardio: Monitor telemetry/alarms. Continue supportive care   aMIOdarone    Tablet 200 milliGRAM(s) Oral every 8 hours  lisinopril 5 milliGRAM(s) Oral daily  metoprolol tartrate 25 milliGRAM(s) Oral every 12 hours  nitroglycerin  Infusion 16.667 MICROgram(s)/Min IV Continuous <Continuous>    GI: Continue stool softeners.    famotidine    Tablet 20 milliGRAM(s) Oral two times a day  sulfaSALAzine 1500 milliGRAM(s) Oral two times a day    Nutrition: Continue present diet  Endocrine and glucose control:   atorvastatin 40 milliGRAM(s) Oral at bedtime  dextrose Oral Gel 15 Gram(s) Oral once PRN  glucagon  Injectable 1 milliGRAM(s) IntraMuscular once  insulin glargine Injectable (LANTUS) 30 Unit(s) SubCutaneous every morning  insulin lispro (ADMELOG) corrective regimen sliding scale   SubCutaneous three times a day before meals  insulin lispro Injectable (ADMELOG) 7 Unit(s) SubCutaneous three times a day before meals    Renal: monitor urine output, supplement electrolytes as needed,     Vasc: Heparin SC and/or SCDs for DVT prophylaxis  clopidogrel Tablet 75 milliGRAM(s) Oral daily    ID: Stable, no fever , no chills. Off antibiotics.  abacavir 600 mG/dolutegravir 50 mG/lamivudine 300 mG 1 Tablet(s) Oral daily  dapsone 100 milliGRAM(s) Oral daily    Tubes: Monitor Chest tube output  Therapy: OOB/ambulate  Disposition: start planing discharge home or placement    Pertinent clinical, laboratory, radiographic, hemodynamic, echocardiographic, respiratory data, microbiologic data and chart were reviewed and analyzed frequently throughout the course of the day and night. GI and DVT prophylaxis, glycemic control, head of bed elevation and skin care issues were addressed.  Patient seen, examined and plan discussed with CT Surgery / CTICU team during rounds.    [ ] The patient remains in critical and unstable condition, and requires ICU care and monitoring  [ ] The patient is improving but requires continued monitoring and adjustment of therapy PROBLEMS:  I spent 45 minutes of critical care time examining patient, reviewing vitals, labs, medications, imaging and discussing with the team goals of care to prevent life-threatening in this patient who is at high risk for deterioration or death due to:      CAD - s/p CABG , Lipitor 40, lopressor 25 q 12, sc lovenox 40  HFrEF acute on chronic - off Milrinone drip - Lasix  40 IV  Tachycardia - start Amiodarone 200 q 8  HTN - lisinopril 5  DM - continue insulin drip start Lantus 30 and Lispro 7  Anemia post op due to acute blood loss  - diuresis - and monitor  thrombocytopenia - monitor   D/c A. Line and Tolliver   Pain - Added Gabapentin 300 and cyclobenzaprine for 3 doses  Mild ST elevation on ECG - pericarditis? - add Motrin 400 mg q 8        Case and plan discussed with CT Surgeons and CTU PAs.  Continue CTU supportive care and ongoing plan of care as per continuing CTU rounds.   Continue DVT/GI prophylaxis.  Incentive Spirometry 10 times an hour.  Continue to advance physical activity as tolerated and continue PT/OT as directed.    PLAN  Neuro: move all 4 extremities. no sensory or motor deficits  Pain management.   gabapentin 300 milliGRAM(s) Oral every 8 hours  oxyCODONE    IR 10 milliGRAM(s) Oral every 4 hours PRN    Pulm: Wean off supplemental oxygen as able. Daily CXR. Encourage coughing, deep breathing and use of incentive spirometry.     Cardio: Monitor telemetry/alarms. Continue supportive care   aMIOdarone    Tablet 200 milliGRAM(s) Oral every 8 hours  lisinopril 5 milliGRAM(s) Oral daily  metoprolol tartrate 25 milliGRAM(s) Oral every 12 hours  nitroglycerin  Infusion 16.667 MICROgram(s)/Min IV Continuous <Continuous>    GI: Continue stool softeners.    famotidine    Tablet 20 milliGRAM(s) Oral two times a day  sulfaSALAzine 1500 milliGRAM(s) Oral two times a day    Nutrition: Continue present diet  Endocrine and glucose control:   atorvastatin 40 milliGRAM(s) Oral at bedtime  dextrose Oral Gel 15 Gram(s) Oral once PRN  glucagon  Injectable 1 milliGRAM(s) IntraMuscular once  insulin glargine Injectable (LANTUS) 30 Unit(s) SubCutaneous every morning  insulin lispro (ADMELOG) corrective regimen sliding scale   SubCutaneous three times a day before meals  insulin lispro Injectable (ADMELOG) 7 Unit(s) SubCutaneous three times a day before meals    Renal: monitor urine output, supplement electrolytes as needed,     Vasc: Heparin SC and/or SCDs for DVT prophylaxis  clopidogrel Tablet 75 milliGRAM(s) Oral daily    ID: Stable, no fever , no chills. Off antibiotics.  abacavir 600 mG/dolutegravir 50 mG/lamivudine 300 mG 1 Tablet(s) Oral daily  dapsone 100 milliGRAM(s) Oral daily    Tubes: Monitor Chest tube output  Therapy: OOB/ambulate  Disposition: start planing discharge home or placement    Pertinent clinical, laboratory, radiographic, hemodynamic, echocardiographic, respiratory data, microbiologic data and chart were reviewed and analyzed frequently throughout the course of the day and night. GI and DVT prophylaxis, glycemic control, head of bed elevation and skin care issues were addressed.  Patient seen, examined and plan discussed with CT Surgery / CTICU team during rounds.    [ ] The patient remains in critical and unstable condition, and requires ICU care and monitoring  [ ] The patient is improving but requires continued monitoring and adjustment of therapy

## 2022-07-05 LAB
ALBUMIN SERPL ELPH-MCNC: 3.8 G/DL — SIGNIFICANT CHANGE UP (ref 3.5–5.2)
ALP SERPL-CCNC: 280 U/L — HIGH (ref 30–115)
ALT FLD-CCNC: 48 U/L — HIGH (ref 0–41)
ANION GAP SERPL CALC-SCNC: 11 MMOL/L — SIGNIFICANT CHANGE UP (ref 7–14)
AST SERPL-CCNC: 74 U/L — HIGH (ref 0–41)
BASOPHILS # BLD AUTO: 0.01 K/UL — SIGNIFICANT CHANGE UP (ref 0–0.2)
BASOPHILS NFR BLD AUTO: 0.2 % — SIGNIFICANT CHANGE UP (ref 0–1)
BILIRUB SERPL-MCNC: 0.4 MG/DL — SIGNIFICANT CHANGE UP (ref 0.2–1.2)
BUN SERPL-MCNC: 22 MG/DL — HIGH (ref 10–20)
CALCIUM SERPL-MCNC: 8.4 MG/DL — LOW (ref 8.5–10.1)
CHLORIDE SERPL-SCNC: 100 MMOL/L — SIGNIFICANT CHANGE UP (ref 98–110)
CO2 SERPL-SCNC: 25 MMOL/L — SIGNIFICANT CHANGE UP (ref 17–32)
CREAT SERPL-MCNC: 0.9 MG/DL — SIGNIFICANT CHANGE UP (ref 0.7–1.5)
EGFR: 98 ML/MIN/1.73M2 — SIGNIFICANT CHANGE UP
EOSINOPHIL # BLD AUTO: 0.07 K/UL — SIGNIFICANT CHANGE UP (ref 0–0.7)
EOSINOPHIL NFR BLD AUTO: 1.1 % — SIGNIFICANT CHANGE UP (ref 0–8)
GLUCOSE BLDC GLUCOMTR-MCNC: 116 MG/DL — HIGH (ref 70–99)
GLUCOSE BLDC GLUCOMTR-MCNC: 122 MG/DL — HIGH (ref 70–99)
GLUCOSE BLDC GLUCOMTR-MCNC: 131 MG/DL — HIGH (ref 70–99)
GLUCOSE BLDC GLUCOMTR-MCNC: 131 MG/DL — HIGH (ref 70–99)
GLUCOSE BLDC GLUCOMTR-MCNC: 132 MG/DL — HIGH (ref 70–99)
GLUCOSE BLDC GLUCOMTR-MCNC: 138 MG/DL — HIGH (ref 70–99)
GLUCOSE BLDC GLUCOMTR-MCNC: 177 MG/DL — HIGH (ref 70–99)
GLUCOSE SERPL-MCNC: 114 MG/DL — HIGH (ref 70–99)
HCT VFR BLD CALC: 24.3 % — LOW (ref 42–52)
HGB BLD-MCNC: 8 G/DL — LOW (ref 14–18)
IMM GRANULOCYTES NFR BLD AUTO: 0.2 % — SIGNIFICANT CHANGE UP (ref 0.1–0.3)
LYMPHOCYTES # BLD AUTO: 1.11 K/UL — LOW (ref 1.2–3.4)
LYMPHOCYTES # BLD AUTO: 17.2 % — LOW (ref 20.5–51.1)
MAGNESIUM SERPL-MCNC: 2.2 MG/DL — SIGNIFICANT CHANGE UP (ref 1.8–2.4)
MCHC RBC-ENTMCNC: 28.5 PG — SIGNIFICANT CHANGE UP (ref 27–31)
MCHC RBC-ENTMCNC: 32.9 G/DL — SIGNIFICANT CHANGE UP (ref 32–37)
MCV RBC AUTO: 86.5 FL — SIGNIFICANT CHANGE UP (ref 80–94)
MONOCYTES # BLD AUTO: 0.66 K/UL — HIGH (ref 0.1–0.6)
MONOCYTES NFR BLD AUTO: 10.2 % — HIGH (ref 1.7–9.3)
NEUTROPHILS # BLD AUTO: 4.59 K/UL — SIGNIFICANT CHANGE UP (ref 1.4–6.5)
NEUTROPHILS NFR BLD AUTO: 71.1 % — SIGNIFICANT CHANGE UP (ref 42.2–75.2)
NRBC # BLD: 0 /100 WBCS — SIGNIFICANT CHANGE UP (ref 0–0)
PLATELET # BLD AUTO: 143 K/UL — SIGNIFICANT CHANGE UP (ref 130–400)
POTASSIUM SERPL-MCNC: 4.5 MMOL/L — SIGNIFICANT CHANGE UP (ref 3.5–5)
POTASSIUM SERPL-SCNC: 4.5 MMOL/L — SIGNIFICANT CHANGE UP (ref 3.5–5)
PROT SERPL-MCNC: 5.4 G/DL — LOW (ref 6–8)
RBC # BLD: 2.81 M/UL — LOW (ref 4.7–6.1)
RBC # FLD: 16.2 % — HIGH (ref 11.5–14.5)
SODIUM SERPL-SCNC: 136 MMOL/L — SIGNIFICANT CHANGE UP (ref 135–146)
WBC # BLD: 6.45 K/UL — SIGNIFICANT CHANGE UP (ref 4.8–10.8)
WBC # FLD AUTO: 6.45 K/UL — SIGNIFICANT CHANGE UP (ref 4.8–10.8)

## 2022-07-05 PROCEDURE — 93010 ELECTROCARDIOGRAM REPORT: CPT

## 2022-07-05 PROCEDURE — 99232 SBSQ HOSP IP/OBS MODERATE 35: CPT

## 2022-07-05 PROCEDURE — 71045 X-RAY EXAM CHEST 1 VIEW: CPT | Mod: 26

## 2022-07-05 RX ORDER — SENNA PLUS 8.6 MG/1
2 TABLET ORAL AT BEDTIME
Refills: 0 | Status: DISCONTINUED | OUTPATIENT
Start: 2022-07-05 | End: 2022-07-06

## 2022-07-05 RX ORDER — FUROSEMIDE 40 MG
40 TABLET ORAL ONCE
Refills: 0 | Status: COMPLETED | OUTPATIENT
Start: 2022-07-05 | End: 2022-07-05

## 2022-07-05 RX ORDER — POTASSIUM CHLORIDE 20 MEQ
20 PACKET (EA) ORAL ONCE
Refills: 0 | Status: COMPLETED | OUTPATIENT
Start: 2022-07-05 | End: 2022-07-05

## 2022-07-05 RX ORDER — COLCHICINE 0.6 MG
0.6 TABLET ORAL EVERY 12 HOURS
Refills: 0 | Status: DISCONTINUED | OUTPATIENT
Start: 2022-07-05 | End: 2022-07-06

## 2022-07-05 RX ADMIN — Medication 25 MILLIGRAM(S): at 17:03

## 2022-07-05 RX ADMIN — FAMOTIDINE 20 MILLIGRAM(S): 10 INJECTION INTRAVENOUS at 05:52

## 2022-07-05 RX ADMIN — DAPSONE 100 MILLIGRAM(S): 100 TABLET ORAL at 11:15

## 2022-07-05 RX ADMIN — LISINOPRIL 5 MILLIGRAM(S): 2.5 TABLET ORAL at 05:52

## 2022-07-05 RX ADMIN — OXYCODONE HYDROCHLORIDE 10 MILLIGRAM(S): 5 TABLET ORAL at 06:57

## 2022-07-05 RX ADMIN — Medication 1500 MILLIGRAM(S): at 17:05

## 2022-07-05 RX ADMIN — POLYETHYLENE GLYCOL 3350 17 GRAM(S): 17 POWDER, FOR SOLUTION ORAL at 11:16

## 2022-07-05 RX ADMIN — CLOPIDOGREL BISULFATE 75 MILLIGRAM(S): 75 TABLET, FILM COATED ORAL at 11:13

## 2022-07-05 RX ADMIN — Medication 5 MILLIGRAM(S): at 17:34

## 2022-07-05 RX ADMIN — Medication 1500 MILLIGRAM(S): at 05:52

## 2022-07-05 RX ADMIN — CHLORHEXIDINE GLUCONATE 15 MILLILITER(S): 213 SOLUTION TOPICAL at 05:52

## 2022-07-05 RX ADMIN — GABAPENTIN 300 MILLIGRAM(S): 400 CAPSULE ORAL at 05:52

## 2022-07-05 RX ADMIN — OXYCODONE HYDROCHLORIDE 10 MILLIGRAM(S): 5 TABLET ORAL at 06:27

## 2022-07-05 RX ADMIN — Medication 7 UNIT(S): at 16:20

## 2022-07-05 RX ADMIN — OXYCODONE HYDROCHLORIDE 10 MILLIGRAM(S): 5 TABLET ORAL at 23:15

## 2022-07-05 RX ADMIN — OXYCODONE HYDROCHLORIDE 10 MILLIGRAM(S): 5 TABLET ORAL at 23:45

## 2022-07-05 RX ADMIN — Medication 20 MILLIEQUIVALENT(S): at 09:39

## 2022-07-05 RX ADMIN — Medication 0.6 MILLIGRAM(S): at 17:02

## 2022-07-05 RX ADMIN — FAMOTIDINE 20 MILLIGRAM(S): 10 INJECTION INTRAVENOUS at 17:03

## 2022-07-05 RX ADMIN — GABAPENTIN 300 MILLIGRAM(S): 400 CAPSULE ORAL at 13:58

## 2022-07-05 RX ADMIN — Medication 7 UNIT(S): at 10:46

## 2022-07-05 RX ADMIN — Medication 0.6 MILLIGRAM(S): at 11:13

## 2022-07-05 RX ADMIN — Medication 25 MILLIGRAM(S): at 05:53

## 2022-07-05 RX ADMIN — Medication 2: at 10:47

## 2022-07-05 RX ADMIN — Medication 7 UNIT(S): at 07:12

## 2022-07-05 RX ADMIN — INSULIN GLARGINE 30 UNIT(S): 100 INJECTION, SOLUTION SUBCUTANEOUS at 07:07

## 2022-07-05 RX ADMIN — SENNA PLUS 2 TABLET(S): 8.6 TABLET ORAL at 21:47

## 2022-07-05 RX ADMIN — OXYCODONE HYDROCHLORIDE 10 MILLIGRAM(S): 5 TABLET ORAL at 15:43

## 2022-07-05 RX ADMIN — GABAPENTIN 300 MILLIGRAM(S): 400 CAPSULE ORAL at 21:47

## 2022-07-05 RX ADMIN — Medication 325 MILLIGRAM(S): at 11:14

## 2022-07-05 RX ADMIN — ENOXAPARIN SODIUM 40 MILLIGRAM(S): 100 INJECTION SUBCUTANEOUS at 05:53

## 2022-07-05 RX ADMIN — Medication 40 MILLIGRAM(S): at 09:39

## 2022-07-05 RX ADMIN — AMIODARONE HYDROCHLORIDE 200 MILLIGRAM(S): 400 TABLET ORAL at 05:52

## 2022-07-05 RX ADMIN — OXYCODONE HYDROCHLORIDE 10 MILLIGRAM(S): 5 TABLET ORAL at 15:13

## 2022-07-05 NOTE — PROGRESS NOTE ADULT - SUBJECTIVE AND OBJECTIVE BOX
CHET DAY  MRN#: [id number]  Subjective:  Patient was seen and evalauted on AM rounds offerring no specific compliants at this time.    OBJECTIVE:  ICU Vital Signs Last 24 Hrs  T(C): 36.7 (2022 08:00), Max: 37.8 (2022 12:00)  T(F): 98 (2022 08:00), Max: 100 (2022 12:00)  HR: 75 (2022 09:00) (75 - 94)  BP: 88/59 (2022 09:00) (88/59 - 136/69)  BP(mean): 69 (2022 09:00) (69 - 96)  ABP: --  ABP(mean): --  RR: 24 (2022 09:00) (17 - 34)  SpO2: 93% (2022 09:00) (88% - 98%)      04 @ 07:  -  05 @ 07:00  --------------------------------------------------------  IN: 440 mL / OUT: 2150 mL / NET: -1710 mL     @ 07:01  -  05 @ 10:25  --------------------------------------------------------  IN: 240 mL / OUT: 0 mL / NET: 240 mL      CAPILLARY BLOOD GLUCOSE      POCT Blood Glucose.: 131 mg/dL (2022 06:45)      PHYSICAL EXAM:Daily     Daily Weight in k (2022 06:00)  General: WN/WD NAD    HEENT:     + NCAT  + EOMI  - Conjuctival edema   - Icterus   - Thrush   - ETT  - NGT/OGT    Neck:         + FROM    - JVD     - Nodes     - Masses    + Mid-line trachea   - Tracheostomy    Chest:         - Sternal click  - Sternal drainage    - SubQ emphysema    Lungs:          + CTA   - Rhonchi    - Rales    - Wheezing     - Decreased BS   - Dullness R L    Cardiac:       + S1 + S2    + RRR   - Irregular   - S3  - S4    - Murmurs   - Rub   - Hamman’s sign     Abdomen:    + BS     + Soft    + Non-tender     - Distended    - Organomegaly  - PEG    Extremities:   - Cyanosis U/L   - Clubbing  U/L  - LE/UE Edema   + Capillary refill    + Pulses     Neuro:        + Awake   +  Alert   - Confused   - Lethargic   - Sedated   - Generalized Weakness    Skin:        - Rashes    - Erythema   + Normal incisions   + IV sites intact  - Sacral decubitus    HOSPITAL MEDICATIONS:  MEDICATIONS  (STANDING):  abacavir 600 mG/dolutegravir 50 mG/lamivudine 300 mG 1 Tablet(s) Oral daily  aspirin enteric coated 325 milliGRAM(s) Oral daily  bisacodyl 5 milliGRAM(s) Oral every 12 hours  clopidogrel Tablet 75 milliGRAM(s) Oral daily  colchicine 0.6 milliGRAM(s) Oral every 12 hours  dapsone 100 milliGRAM(s) Oral daily  dextrose 5%. 1000 milliLiter(s) (50 mL/Hr) IV Continuous <Continuous>  dextrose 5%. 1000 milliLiter(s) (100 mL/Hr) IV Continuous <Continuous>  dextrose 50% Injectable 50 milliLiter(s) IV Push every 15 minutes  dextrose 50% Injectable 25 milliLiter(s) IV Push every 15 minutes  enoxaparin Injectable 40 milliGRAM(s) SubCutaneous every 24 hours  famotidine    Tablet 20 milliGRAM(s) Oral two times a day  famotidine    Tablet 20 milliGRAM(s) Oral every 12 hours  gabapentin 300 milliGRAM(s) Oral every 8 hours  glucagon  Injectable 1 milliGRAM(s) IntraMuscular once  insulin glargine Injectable (LANTUS) 30 Unit(s) SubCutaneous every morning  insulin lispro (ADMELOG) corrective regimen sliding scale   SubCutaneous three times a day before meals  insulin lispro Injectable (ADMELOG) 7 Unit(s) SubCutaneous three times a day before meals  lisinopril 5 milliGRAM(s) Oral daily  metoprolol tartrate 25 milliGRAM(s) Oral every 12 hours  polyethylene glycol 3350 17 Gram(s) Oral daily  senna 2 Tablet(s) Oral at bedtime  sulfaSALAzine 1500 milliGRAM(s) Oral two times a day    MEDICATIONS  (PRN):  acetaminophen     Tablet .. 650 milliGRAM(s) Oral every 6 hours PRN Temp greater or equal to 38C (100.4F), Mild Pain (1 - 3)  dextrose Oral Gel 15 Gram(s) Oral once PRN Blood Glucose LESS THAN 70 milliGRAM(s)/deciliter  ondansetron  IVPB 4 milliGRAM(s) IV Intermittent once PRN Nausea and/or Vomiting  oxyCODONE    IR 5 milliGRAM(s) Oral every 4 hours PRN Moderate Pain (4 - 6)  oxyCODONE    IR 10 milliGRAM(s) Oral every 4 hours PRN Severe Pain (7 - 10)      LABS:                        8.0    6.45  )-----------( 143      ( 2022 03:25 )             24.3    07-    136  |  100  |  22<H>  ----------------------------<  114<H>  4.5   |  25  |  0.9    Ca    8.4<L>      2022 03:25  Mg     2.2     07-    TPro  5.4<L>  /  Alb  3.8  /  TBili  0.4  /  DBili  x   /  AST  74<H>  /  ALT  48<H>  /  AlkPhos  280<H>  07-     LIVER FUNCTIONS - ( 2022 03:25 )  Alb: 3.8 g/dL / Pro: 5.4 g/dL / ALK PHOS: 280 U/L / ALT: 48 U/L / AST: 74 U/L / GGT: x               RADIOLOGY:  X Reviewed and interpreted by me: L-base opacity/effusion    CARDIOPULMONARY DYSFUNCTION  - Respiratory status required supplemental oxygen & the following of continuous pulse oximetry for support & to prevent decompensation  - Continued early mobilization as tolerated  - Addressed analgesic regimen to optimize function    PREVENTION-PROPHYLAXIS  - ASA continued for graft occlusion-thromboembolism prophylaxis  - Lipitor was also started for long term graft patency  - Lovenox continued for VTE prophylaxis in addition to Venodyne boots  - Pepcid maintained for GI bleeding prophylaxis  - Lopressor continued for atrial fibrillation prophylaxis  - Metabolic stability & infection prophylaxis required review and adjustment of regular Insulin sliding scale and gylcemic regimen while following serial glucose levels to help achieve & maintain euglycemia  - Reviewed & addressed surgical site infection prophylaxis regimen

## 2022-07-05 NOTE — PROGRESS NOTE ADULT - SUBJECTIVE AND OBJECTIVE BOX
OPERATIVE PROCEDURE(s):                POD #  5 s/p CABG                     61yMale  SURGEON(s): SHANE Barrera  SUBJECTIVE ASSESSMENT: less pain    Vital Signs Last 24 Hrs  T(F): 98 (05 Jul 2022 08:00), Max: 100 (04 Jul 2022 12:00)  HR: 80 (05 Jul 2022 10:00) (75 - 94)  BP: 132/70 (05 Jul 2022 10:00) (88/59 - 136/69)  BP(mean): 95 (05 Jul 2022 10:00) (69 - 96)  RR: 22 (05 Jul 2022 10:00) (17 - 34)  SpO2: 97% (05 Jul 2022 10:00) (88% - 98%)    I&O's Detail    04 Jul 2022 07:01  -  05 Jul 2022 07:00  --------------------------------------------------------  IN:    Insulin: 40 mL    Oral Fluid: 120 mL    sodium chloride 0.9%: 280 mL  Total IN: 440 mL    OUT:    Indwelling Catheter - Urethral (mL): 1450 mL    Voided (mL): 700 mL  Total OUT: 2150 mL    Net:   I&O's Detail    03 Jul 2022 07:01  -  04 Jul 2022 07:00  --------------------------------------------------------  Total NET: 355 mL    04 Jul 2022 07:01  -  05 Jul 2022 07:00  --------------------------------------------------------  Total NET: -1710 mL    CAPILLARY BLOOD GLUCOSE      POCT Blood Glucose.: 177 mg/dL (05 Jul 2022 10:32)  POCT Blood Glucose.: 131 mg/dL (05 Jul 2022 06:45)  POCT Blood Glucose.: 116 mg/dL (05 Jul 2022 04:23)  POCT Blood Glucose.: 138 mg/dL (05 Jul 2022 04:21)  POCT Blood Glucose.: 122 mg/dL (05 Jul 2022 02:36)  POCT Blood Glucose.: 97 mg/dL (04 Jul 2022 22:09)  POCT Blood Glucose.: 119 mg/dL (04 Jul 2022 20:21)  POCT Blood Glucose.: 155 mg/dL (04 Jul 2022 18:09)  POCT Blood Glucose.: 157 mg/dL (04 Jul 2022 16:39)  POCT Blood Glucose.: 138 mg/dL (04 Jul 2022 15:10)  POCT Blood Glucose.: 141 mg/dL (04 Jul 2022 12:43)  POCT Blood Glucose.: 136 mg/dL (04 Jul 2022 11:32)    Physical Exam:  General: NAD; A&Ox3  Cardiac: S1/S2, RRR, no murmur, no rubs  Lungs: unlabored very shallow respirations, b/l bs decreased at bases L>R  Abdomen: Soft/NT/protuberant  Sternum: Intact, no click, incision healing well with no drainage  Incisions: Incisions clean/dry/intact  Extremities: No edema b/l lower extremities    Central Venous Catheter: Yes[]  critical patient   BOWEL MOVEMENT:   [] NO,      LABS:                        8.0<L>  6.45  )-----------( 143      ( 05 Jul 2022 03:25 )             24.3<L>                        7.4<L>  8.97  )-----------( 124<L>    ( 04 Jul 2022 01:20 )             21.9<L>    07-05    136  |  100  |  22<H>  ----------------------------<  114<H>  4.5   |  25  |  0.9  07-04    133<L>  |  101  |  20  ----------------------------<  138<H>  4.3   |  21  |  1.0    Ca    8.4<L>      05 Jul 2022 03:25  Mg     2.2     07-05    TPro  5.4<L> [6.0 - 8.0]  /  Alb  3.8 [3.5 - 5.2]  /  TBili  0.4 [0.2 - 1.2]  /  DBili  x   /  AST  74<H> [0 - 41]  /  ALT  48<H> [0 - 41]  /  AlkPhos  280<H> [30 - 115]  07-05    ABG - ( 04 Jul 2022 04:10 )  pH: 7.42  /  pCO2: 40    /  pO2: 91    / HCO3: 26    / Base Excess: 1.3   /  SaO2: 98.3  /  LA: 0.70     RADIOLOGY & ADDITIONAL TESTS:  CXR:  < from: Xray Chest 1 View- PORTABLE-Routine (Xray Chest 1 View- PORTABLE-Routine in AM.) (07.05.22 @ 06:19) >  No change since one day earlier.    < end of copied text >    EKG: mild ST elevations    MEDICATIONS  (STANDING):  abacavir 600 mG/dolutegravir 50 mG/lamivudine 300 mG 1 Tablet(s) Oral daily  aspirin enteric coated 325 milliGRAM(s) Oral daily  bisacodyl 5 milliGRAM(s) Oral every 12 hours  clopidogrel Tablet 75 milliGRAM(s) Oral daily  colchicine 0.6 milliGRAM(s) Oral every 12 hours  dapsone 100 milliGRAM(s) Oral daily  dextrose 5%. 1000 milliLiter(s) (50 mL/Hr) IV Continuous <Continuous>  dextrose 5%. 1000 milliLiter(s) (100 mL/Hr) IV Continuous <Continuous>  dextrose 50% Injectable 50 milliLiter(s) IV Push every 15 minutes  dextrose 50% Injectable 25 milliLiter(s) IV Push every 15 minutes  enoxaparin Injectable 40 milliGRAM(s) SubCutaneous every 24 hours  famotidine    Tablet 20 milliGRAM(s) Oral two times a day  famotidine    Tablet 20 milliGRAM(s) Oral every 12 hours  gabapentin 300 milliGRAM(s) Oral every 8 hours  glucagon  Injectable 1 milliGRAM(s) IntraMuscular once  insulin glargine Injectable (LANTUS) 30 Unit(s) SubCutaneous every morning  insulin lispro (ADMELOG) corrective regimen sliding scale   SubCutaneous three times a day before meals  insulin lispro Injectable (ADMELOG) 7 Unit(s) SubCutaneous three times a day before meals  lisinopril 5 milliGRAM(s) Oral daily  metoprolol tartrate 25 milliGRAM(s) Oral every 12 hours  polyethylene glycol 3350 17 Gram(s) Oral daily  senna 2 Tablet(s) Oral at bedtime  sulfaSALAzine 1500 milliGRAM(s) Oral two times a day    MEDICATIONS  (PRN):  acetaminophen     Tablet .. 650 milliGRAM(s) Oral every 6 hours PRN Temp greater or equal to 38C (100.4F), Mild Pain (1 - 3)  dextrose Oral Gel 15 Gram(s) Oral once PRN Blood Glucose LESS THAN 70 milliGRAM(s)/deciliter  ondansetron  IVPB 4 milliGRAM(s) IV Intermittent once PRN Nausea and/or Vomiting  oxyCODONE    IR 5 milliGRAM(s) Oral every 4 hours PRN Moderate Pain (4 - 6)  oxyCODONE    IR 10 milliGRAM(s) Oral every 4 hours PRN Severe Pain (7 - 10)    Ambulation/Activity Status: ambulate as tolerated    Assessment/Plan:  61y Male status-post s/p CABG  day 4  - Case and plan discussed with CTU Intensivist and CT Surgeon - Dr. Barrera  - Continue CTU supportive care    - Continue DVT prophylaxis  - Continue GI prophylaxis  - Incentive Spirometry 10 times an hour  - Continue to advance physical activity as tolerated and continue PT/OT as directed  - CAD: Continue ASA, stop statin rising LFT's, continue BB,  Plavix  - Anemia secondary to: Chronic Disease   and Acute PO blood loss anemia,     transfused 2 units PRBC  this admission  - Depressed LV function milrinone weaned off  - patient stable  - HIV continue dapsone dosing and continue antivirals  - HTN - lisinopri and BB  - fluid overload secondary too: acute non cardiac fluid over load - will give lasix  and potassium  - tachycardia resolved-  Amiodarone 200mg Q8 - EKG QTC < 465 ,  start start  colchicine for mild  ST elevation with low grade fever (early pericarditis)  - A. Fib: prophylaxis with magnesium stop amiodarone due to transaminitis   - COPD/Hypoxia: continue duonebs, continue supplemental O2, Incentive spirometry, wean off    - DM/Glucose Control: A1c 4.7 - discontinue insulin drip continue Lantus 30 and Lispro 7  - ambulate  - Pain - gabapentin   - remove Cordis  - transaminitis- mild  - stop Amiodarone and Satins     Social Service Disposition:  home next 48-72 hours

## 2022-07-05 NOTE — PROGRESS NOTE ADULT - ASSESSMENT
Assessment/Plan:  CAD-s/p CABG x 4-POD #4  1-BP control-metoprolol, lisinopril  2-serum glucose control-Lantus/Lispro with sliding scale correction regimen  3-fluid overload-diuresis  4-acute blood loss anemia-stable s/p transfusion pRBCs, monitor Hb/Hct daily  5-HIV ds-continue antiretroviral/prophylactic meds  1-vidxwwdeculxipz-svxr statin, amio-monitor daily

## 2022-07-06 ENCOUNTER — TRANSCRIPTION ENCOUNTER (OUTPATIENT)
Age: 62
End: 2022-07-06

## 2022-07-06 VITALS
DIASTOLIC BLOOD PRESSURE: 70 MMHG | HEART RATE: 79 BPM | TEMPERATURE: 99 F | RESPIRATION RATE: 24 BRPM | SYSTOLIC BLOOD PRESSURE: 146 MMHG | OXYGEN SATURATION: 95 %

## 2022-07-06 LAB
ALBUMIN SERPL ELPH-MCNC: 3.6 G/DL — SIGNIFICANT CHANGE UP (ref 3.5–5.2)
ALP SERPL-CCNC: 339 U/L — HIGH (ref 30–115)
ALT FLD-CCNC: 57 U/L — HIGH (ref 0–41)
ANION GAP SERPL CALC-SCNC: 10 MMOL/L — SIGNIFICANT CHANGE UP (ref 7–14)
AST SERPL-CCNC: 55 U/L — HIGH (ref 0–41)
BASOPHILS # BLD AUTO: 0.04 K/UL — SIGNIFICANT CHANGE UP (ref 0–0.2)
BASOPHILS NFR BLD AUTO: 0.8 % — SIGNIFICANT CHANGE UP (ref 0–1)
BILIRUB SERPL-MCNC: 0.4 MG/DL — SIGNIFICANT CHANGE UP (ref 0.2–1.2)
BUN SERPL-MCNC: 19 MG/DL — SIGNIFICANT CHANGE UP (ref 10–20)
CALCIUM SERPL-MCNC: 8.8 MG/DL — SIGNIFICANT CHANGE UP (ref 8.5–10.1)
CHLORIDE SERPL-SCNC: 102 MMOL/L — SIGNIFICANT CHANGE UP (ref 98–110)
CO2 SERPL-SCNC: 27 MMOL/L — SIGNIFICANT CHANGE UP (ref 17–32)
CREAT SERPL-MCNC: 0.9 MG/DL — SIGNIFICANT CHANGE UP (ref 0.7–1.5)
EGFR: 98 ML/MIN/1.73M2 — SIGNIFICANT CHANGE UP
EOSINOPHIL # BLD AUTO: 0.11 K/UL — SIGNIFICANT CHANGE UP (ref 0–0.7)
EOSINOPHIL NFR BLD AUTO: 2.1 % — SIGNIFICANT CHANGE UP (ref 0–8)
GLUCOSE BLDC GLUCOMTR-MCNC: 126 MG/DL — HIGH (ref 70–99)
GLUCOSE BLDC GLUCOMTR-MCNC: 260 MG/DL — HIGH (ref 70–99)
GLUCOSE SERPL-MCNC: 126 MG/DL — HIGH (ref 70–99)
HCT VFR BLD CALC: 26.7 % — LOW (ref 42–52)
HGB BLD-MCNC: 8.9 G/DL — LOW (ref 14–18)
IMM GRANULOCYTES NFR BLD AUTO: 0.4 % — HIGH (ref 0.1–0.3)
LYMPHOCYTES # BLD AUTO: 1.05 K/UL — LOW (ref 1.2–3.4)
LYMPHOCYTES # BLD AUTO: 20.2 % — LOW (ref 20.5–51.1)
MAGNESIUM SERPL-MCNC: 2.1 MG/DL — SIGNIFICANT CHANGE UP (ref 1.8–2.4)
MCHC RBC-ENTMCNC: 28.7 PG — SIGNIFICANT CHANGE UP (ref 27–31)
MCHC RBC-ENTMCNC: 33.3 G/DL — SIGNIFICANT CHANGE UP (ref 32–37)
MCV RBC AUTO: 86.1 FL — SIGNIFICANT CHANGE UP (ref 80–94)
MONOCYTES # BLD AUTO: 0.84 K/UL — HIGH (ref 0.1–0.6)
MONOCYTES NFR BLD AUTO: 16.2 % — HIGH (ref 1.7–9.3)
NEUTROPHILS # BLD AUTO: 3.13 K/UL — SIGNIFICANT CHANGE UP (ref 1.4–6.5)
NEUTROPHILS NFR BLD AUTO: 60.3 % — SIGNIFICANT CHANGE UP (ref 42.2–75.2)
NRBC # BLD: 0 /100 WBCS — SIGNIFICANT CHANGE UP (ref 0–0)
PLATELET # BLD AUTO: 176 K/UL — SIGNIFICANT CHANGE UP (ref 130–400)
POTASSIUM SERPL-MCNC: 3.9 MMOL/L — SIGNIFICANT CHANGE UP (ref 3.5–5)
POTASSIUM SERPL-SCNC: 3.9 MMOL/L — SIGNIFICANT CHANGE UP (ref 3.5–5)
PROT SERPL-MCNC: 5.8 G/DL — LOW (ref 6–8)
RBC # BLD: 3.1 M/UL — LOW (ref 4.7–6.1)
RBC # FLD: 15.9 % — HIGH (ref 11.5–14.5)
SODIUM SERPL-SCNC: 139 MMOL/L — SIGNIFICANT CHANGE UP (ref 135–146)
WBC # BLD: 5.19 K/UL — SIGNIFICANT CHANGE UP (ref 4.8–10.8)
WBC # FLD AUTO: 5.19 K/UL — SIGNIFICANT CHANGE UP (ref 4.8–10.8)

## 2022-07-06 PROCEDURE — 71046 X-RAY EXAM CHEST 2 VIEWS: CPT | Mod: 26

## 2022-07-06 PROCEDURE — 71045 X-RAY EXAM CHEST 1 VIEW: CPT | Mod: 26,59

## 2022-07-06 PROCEDURE — 93010 ELECTROCARDIOGRAM REPORT: CPT

## 2022-07-06 PROCEDURE — 76705 ECHO EXAM OF ABDOMEN: CPT | Mod: 26

## 2022-07-06 PROCEDURE — 74018 RADEX ABDOMEN 1 VIEW: CPT | Mod: 26

## 2022-07-06 PROCEDURE — 99233 SBSQ HOSP IP/OBS HIGH 50: CPT

## 2022-07-06 RX ORDER — POLYETHYLENE GLYCOL 3350 17 G/17G
17 POWDER, FOR SOLUTION ORAL
Qty: 510 | Refills: 0
Start: 2022-07-06 | End: 2022-08-04

## 2022-07-06 RX ORDER — FAMOTIDINE 10 MG/ML
1 INJECTION INTRAVENOUS
Qty: 0 | Refills: 0 | DISCHARGE

## 2022-07-06 RX ORDER — COLCHICINE 0.6 MG
1 TABLET ORAL
Qty: 8 | Refills: 0
Start: 2022-07-06

## 2022-07-06 RX ORDER — CELECOXIB 200 MG/1
1 CAPSULE ORAL
Qty: 0 | Refills: 0 | DISCHARGE

## 2022-07-06 RX ORDER — KETOROLAC TROMETHAMINE 30 MG/ML
30 SYRINGE (ML) INJECTION ONCE
Refills: 0 | Status: DISCONTINUED | OUTPATIENT
Start: 2022-07-06 | End: 2022-07-06

## 2022-07-06 RX ORDER — SIMETHICONE 80 MG/1
80 TABLET, CHEWABLE ORAL EVERY 6 HOURS
Refills: 0 | Status: DISCONTINUED | OUTPATIENT
Start: 2022-07-06 | End: 2022-07-06

## 2022-07-06 RX ORDER — METOPROLOL TARTRATE 50 MG
1 TABLET ORAL
Qty: 60 | Refills: 0
Start: 2022-07-06

## 2022-07-06 RX ORDER — POTASSIUM CHLORIDE 20 MEQ
20 PACKET (EA) ORAL
Refills: 0 | Status: COMPLETED | OUTPATIENT
Start: 2022-07-06 | End: 2022-07-06

## 2022-07-06 RX ORDER — CLOPIDOGREL BISULFATE 75 MG/1
1 TABLET, FILM COATED ORAL
Qty: 30 | Refills: 0
Start: 2022-07-06

## 2022-07-06 RX ORDER — ASPIRIN/CALCIUM CARB/MAGNESIUM 324 MG
1 TABLET ORAL
Qty: 30 | Refills: 0
Start: 2022-07-06

## 2022-07-06 RX ADMIN — INSULIN GLARGINE 30 UNIT(S): 100 INJECTION, SOLUTION SUBCUTANEOUS at 07:18

## 2022-07-06 RX ADMIN — Medication 30 MILLIGRAM(S): at 12:00

## 2022-07-06 RX ADMIN — Medication 10 MILLIGRAM(S): at 08:55

## 2022-07-06 RX ADMIN — Medication 25 MILLIGRAM(S): at 06:13

## 2022-07-06 RX ADMIN — Medication 7 UNIT(S): at 07:10

## 2022-07-06 RX ADMIN — GABAPENTIN 300 MILLIGRAM(S): 400 CAPSULE ORAL at 16:30

## 2022-07-06 RX ADMIN — Medication 100 MILLIGRAM(S): at 16:31

## 2022-07-06 RX ADMIN — POLYETHYLENE GLYCOL 3350 17 GRAM(S): 17 POWDER, FOR SOLUTION ORAL at 12:01

## 2022-07-06 RX ADMIN — Medication 0.6 MILLIGRAM(S): at 06:12

## 2022-07-06 RX ADMIN — Medication 20 MILLIEQUIVALENT(S): at 10:33

## 2022-07-06 RX ADMIN — CLOPIDOGREL BISULFATE 75 MILLIGRAM(S): 75 TABLET, FILM COATED ORAL at 12:01

## 2022-07-06 RX ADMIN — OXYCODONE HYDROCHLORIDE 10 MILLIGRAM(S): 5 TABLET ORAL at 07:30

## 2022-07-06 RX ADMIN — SIMETHICONE 80 MILLIGRAM(S): 80 TABLET, CHEWABLE ORAL at 12:00

## 2022-07-06 RX ADMIN — Medication 325 MILLIGRAM(S): at 12:01

## 2022-07-06 RX ADMIN — Medication 20 MILLIEQUIVALENT(S): at 08:58

## 2022-07-06 RX ADMIN — OXYCODONE HYDROCHLORIDE 10 MILLIGRAM(S): 5 TABLET ORAL at 07:00

## 2022-07-06 RX ADMIN — DAPSONE 100 MILLIGRAM(S): 100 TABLET ORAL at 12:10

## 2022-07-06 RX ADMIN — LISINOPRIL 5 MILLIGRAM(S): 2.5 TABLET ORAL at 06:13

## 2022-07-06 RX ADMIN — Medication 1500 MILLIGRAM(S): at 06:56

## 2022-07-06 RX ADMIN — FAMOTIDINE 20 MILLIGRAM(S): 10 INJECTION INTRAVENOUS at 06:13

## 2022-07-06 RX ADMIN — Medication 7 UNIT(S): at 12:02

## 2022-07-06 RX ADMIN — Medication 100 MILLIGRAM(S): at 09:00

## 2022-07-06 RX ADMIN — GABAPENTIN 300 MILLIGRAM(S): 400 CAPSULE ORAL at 06:13

## 2022-07-06 RX ADMIN — ENOXAPARIN SODIUM 40 MILLIGRAM(S): 100 INJECTION SUBCUTANEOUS at 06:12

## 2022-07-06 RX ADMIN — Medication 6: at 12:02

## 2022-07-06 RX ADMIN — Medication 5 MILLIGRAM(S): at 06:13

## 2022-07-06 NOTE — PROGRESS NOTE ADULT - ASSESSMENT
PROBLEMS:  I spent 45 minutes of critical care time examining patient, reviewing vitals, labs, medications, imaging and discussing with the team goals of care to prevent life-threatening in this patient who is at high risk for deterioration or death due to:      CAD - s/p CABG , Lipitor 40, lopressor 25 q 12, sc lovenox 40  HFrEF acute on chronic resolving  Resolving transaminazemia  rising Alk. Phos - Abdominal sonogram  Abdominal distention - KUB, Dulcolax Supp, simethicone 80  HTN - lisinopril 5  DM - continue insulin drip start Lantus 30 and Lispro 7  Anemia post op due to acute blood loss  - diuresis - and monitor  Pain - on Gabapentin 300 toradol, oxycodone  Mild ST elevation on ECG - pericarditis? - on Cholchicine 0.6 q 12  Excessive couch - benzonatate 100 q 8        Case and plan discussed with CT Surgeons and CTU PAs.  Continue CTU supportive care and ongoing plan of care as per continuing CTU rounds.   Continue DVT/GI prophylaxis.  Incentive Spirometry 10 times an hour.  Continue to advance physical activity as tolerated and continue PT/OT as directed.    PLAN  Neuro: move all 4 extremities. no sensory or motor deficits  Pain management.   gabapentin 300 milliGRAM(s) Oral every 8 hours  oxyCODONE    IR 10 milliGRAM(s) Oral every 4 hours PRN    Pulm: Wean off supplemental oxygen as able. Daily CXR. Encourage coughing, deep breathing and use of incentive spirometry.   benzonatate 100 milliGRAM(s) Oral every 8 hours    Cardio: Monitor telemetry/alarms. Continue supportive care   lisinopril 5 milliGRAM(s) Oral daily  metoprolol tartrate 25 milliGRAM(s) Oral every 12 hours    GI: Continue stool softeners.    bisacodyl 5 milliGRAM(s) Oral every 12 hours  famotidine    Tablet 20 milliGRAM(s) Oral two times a day  senna 2 Tablet(s) Oral at bedtime  simethicone 80 milliGRAM(s) Chew every 6 hours PRN  sulfaSALAzine 1500 milliGRAM(s) Oral two times a day    Nutrition: Continue present diet  Endocrine and glucose control:   colchicine 0.6 milliGRAM(s) Oral every 12 hours  dextrose Oral Gel 15 Gram(s) Oral once PRN  glucagon  Injectable 1 milliGRAM(s) IntraMuscular once  insulin glargine Injectable (LANTUS) 30 Unit(s) SubCutaneous every morning  insulin lispro (ADMELOG) corrective regimen sliding scale   SubCutaneous three times a day before meals  insulin lispro Injectable (ADMELOG) 7 Unit(s) SubCutaneous three times a day before meals    Renal: monitor urine output, supplement electrolytes as needed,     Vasc: Heparin SC and/or SCDs for DVT prophylaxis  clopidogrel Tablet 75 milliGRAM(s) Oral daily    ID: Stable, no fever , no chills.   abacavir 600 mG/dolutegravir 50 mG/lamivudine 300 mG 1 Tablet(s) Oral daily  dapsone 100 milliGRAM(s) Oral daily    Therapy: OOB/ambulate  Disposition: start planing discharge home or placement    Pertinent clinical, laboratory, radiographic, hemodynamic, echocardiographic, respiratory data, microbiologic data and chart were reviewed and analyzed frequently throughout the course of the day and night. GI and DVT prophylaxis, glycemic control, head of bed elevation and skin care issues were addressed.  Patient seen, examined and plan discussed with CT Surgery / CTICU team during rounds.    [ ] The patient remains in critical and unstable condition, and requires ICU care and monitoring  [x ] The patient is improving but requires continued monitoring and adjustment of therapy

## 2022-07-06 NOTE — DISCHARGE NOTE PROVIDER - CARE PROVIDER_API CALL
Bladimir Barrera (MD)  Surgery; Thoracic and Cardiac Surgery  [address], [address]  [place] [address]  Phone: [phone]  Fax: [phone]  Follow Up Time:     [place]  CARDIOVASCULAR DISEASE  [address] [facility] [facility] [address]  [place] [address]  Phone: [phone]  Fax: [phone]  Follow Up Time:     Bladimir Henderson (DO)  Infectious Disease; Internal Medicine  [address] [place] [address]  Phone: [phone]  Fax: [phone]  Follow Up Time:

## 2022-07-06 NOTE — DISCHARGE NOTE PROVIDER - NSDCFUSCHEDAPPT_GEN_ALL_CORE_FT
Bladimir Barrera  [facility]  CTSURG [address] [facility]  Scheduled Appointment: 07/13/2022

## 2022-07-06 NOTE — DISCHARGE NOTE PROVIDER - PROVIDER TOKENS
PROVIDER:[TOKEN:[[id number]]],PROVIDER:[TOKEN:[[id number]]],PROVIDER:[TOKEN:[[id number]]]

## 2022-07-06 NOTE — PROGRESS NOTE ADULT - PROVIDER SPECIALTY LIST ADULT
CT Surgery
Critical Care
CT Surgery
Cardiology
Critical Care

## 2022-07-06 NOTE — DISCHARGE NOTE NURSING/CASE MANAGEMENT/SOCIAL WORK - PATIENT PORTAL LINK FT
You can access the FollowMyHealth Patient Portal offered by [facility] by registering at the following website: [email or web address]. By joining [facility] FollowMyHealth portal, you will also be able to view your health information using other applications (apps) compatible with our system.

## 2022-07-06 NOTE — DISCHARGE NOTE NURSING/CASE MANAGEMENT/SOCIAL WORK - NSDCFUADDAPPT_GEN_ALL_CORE_FT
please follow up with dr yoon on 7/13 at 10 am    please call for follow up appointment with cardiologist in 2 weeks and with pmd in 4 weeks

## 2022-07-06 NOTE — DISCHARGE NOTE NURSING/CASE MANAGEMENT/SOCIAL WORK - NSDCPEFALRISK_GEN_ALL_CORE
For information on Fall & Injury Prevention, visit: [email or web address] OR  [email or web address] OR  https://www.cdc.gov/steadi/patient.html

## 2022-07-06 NOTE — DISCHARGE NOTE PROVIDER - HOSPITAL COURSE
61y Male with PMH of HIV, former drug abuser, ETOH abuse, RA, presented to his cardiologist for 2 years of chest pain on exertion, stress test negative, and normal EKG, had subsequent CTA which showed LM and 3vCAD. Cardiac cath showed LM 90%, 3vCAD. CT surgery consulted for CABG evaluation. On 7/01, the patient underwent a cabg x 4 and a postop course significant for mildly elevated LFT's and alkaline phosphate.  He had a KUB that was negative for obstruction and an abdominal US that was negative for any cholelithiasis / cholecystitis.  Amiodarone and statin therapy were stopped.  He was then discharged home in stable condition on POD # 5. 61y Male with PMH of HIV, former drug abuser, ETOH abuse, RA, presented to his cardiologist for 2 years of chest pain on exertion, stress test negative, and normal EKG, had subsequent CTA which showed LM and 3vCAD. Cardiac cath showed LM 90%, 3vCAD. CT surgery consulted for CABG evaluation. On 7/01, the patient underwent a cabg x 4 and a postop course significant for mildly elevated LFT's and alkaline phosphate.  He had a KUB that was negative for obstruction and an abdominal US that was negative for any cholelithiasis / cholecystitis.  Amiodarone and statin therapy were stopped.  He was then discharged home in stable condition on POD # 5 with instructions to have a repeat CMP prior to the follow up visit.

## 2022-07-06 NOTE — DISCHARGE NOTE PROVIDER - NSDCMRMEDTOKEN_GEN_ALL_CORE_FT
aspirin 325 mg oral delayed release tablet: 1 tab(s) orally once a day  benzonatate 100 mg oral capsule: 1 cap(s) orally every 8 hours  clopidogrel 75 mg oral tablet: 1 tab(s) orally once a day  colchicine 0.6 mg oral tablet: 1 tab(s) orally every 12 hours  dapsone 100 mg oral tablet: orally 3 times a week m w f   hyoscyamine 0.375 mg oral capsule, extended release: 1 tab(s) orally  lisinopril 30 mg oral tablet: 1 tab(s) orally once a day  metoprolol tartrate 25 mg oral tablet: 1 tab(s) orally every 12 hours  oxycodone-acetaminophen 5 mg-325 mg oral tablet: 1 tab(s) orally every 6 hours, As Needed -for moderate pain MDD:6   polyethylene glycol 3350 oral powder for reconstitution: 17 gram(s) orally once a day  sulfaSALAzine 500 mg oral delayed release tablet: 2 tab(s) orally 2 times a day  Triumeq oral tablet: 1 tab(s) orally once a day 600/50/300 mg po takes after meal

## 2022-07-06 NOTE — PROGRESS NOTE ADULT - SUBJECTIVE AND OBJECTIVE BOX
OPERATIVE PROCEDURE(s):          cabg x 4      POD # 5    SURGEON(s): car    SUBJECTIVE ASSESSMENT: pt was complaining of constipation, but now feels better after bm; pt also complains of cough    Vital Signs Last 24 Hrs  T(C): 37.1 (06 Jul 2022 12:00), Max: 37.6 (05 Jul 2022 20:00)  T(F): 98.7 (06 Jul 2022 12:00), Max: 99.7 (05 Jul 2022 20:00)  HR: 77 (06 Jul 2022 12:00) (73 - 95)  BP: 132/67 (06 Jul 2022 12:00) (124/60 - 142/65)  BP(mean): 94 (06 Jul 2022 12:00) (86 - 114)  RR: 26 (06 Jul 2022 12:00) (17 - 27)  SpO2: 94% (06 Jul 2022 12:00) (90% - 96%)  07-05-22 @ 07:01  -  07-06-22 @ 07:00  --------------------------------------------------------  IN: 1360 mL / OUT: 2500 mL / NET: -1140 mL    07-06-22 @ 07:01  -  07-06-22 @ 14:15  --------------------------------------------------------  IN: 360 mL / OUT: 200 mL / NET: 160 mL      General: NAD; A&Ox3  Cardiac: S1/S2, RRR, no murmur, no rubs  Lungs: unlabored very shallow respirations, b/l bs decreased at bases L>R  Abdomen: Soft/NT/protuberant  Sternum: Intact, no click, incision healing well with no drainage  Incisions: Incisions clean/dry/intact  Extremities: No edema b/l lower extremities    LABS:                        8.9    5.19  )-----------( 176      ( 06 Jul 2022 06:00 )             26.7     COUMADIN:   [ ] YES [x ] NO      07-06    139  |  102  |  19  ----------------------------<  126<H>  3.9   |  27  |  0.9    Ca    8.8      06 Jul 2022 06:00  Mg     2.1     07-06    TPro  5.8<L>  /  Alb  3.6  /  TBili  0.4  /  DBili  x   /  AST  55<H>  /  ALT  57<H>  /  AlkPhos  339<H>  07-06    MEDICATIONS  (STANDING):  abacavir 600 mG/dolutegravir 50 mG/lamivudine 300 mG 1 Tablet(s) Oral daily  aspirin enteric coated 325 milliGRAM(s) Oral daily  benzonatate 100 milliGRAM(s) Oral every 8 hours  bisacodyl 5 milliGRAM(s) Oral every 12 hours  clopidogrel Tablet 75 milliGRAM(s) Oral daily  colchicine 0.6 milliGRAM(s) Oral every 12 hours  dapsone 100 milliGRAM(s) Oral daily  dextrose 5%. 1000 milliLiter(s) (100 mL/Hr) IV Continuous <Continuous>  dextrose 5%. 1000 milliLiter(s) (50 mL/Hr) IV Continuous <Continuous>  dextrose 50% Injectable 50 milliLiter(s) IV Push every 15 minutes  dextrose 50% Injectable 25 milliLiter(s) IV Push every 15 minutes  enoxaparin Injectable 40 milliGRAM(s) SubCutaneous every 24 hours  famotidine    Tablet 20 milliGRAM(s) Oral two times a day  famotidine    Tablet 20 milliGRAM(s) Oral every 12 hours  gabapentin 300 milliGRAM(s) Oral every 8 hours  glucagon  Injectable 1 milliGRAM(s) IntraMuscular once  insulin glargine Injectable (LANTUS) 30 Unit(s) SubCutaneous every morning  insulin lispro (ADMELOG) corrective regimen sliding scale   SubCutaneous three times a day before meals  insulin lispro Injectable (ADMELOG) 7 Unit(s) SubCutaneous three times a day before meals  lisinopril 5 milliGRAM(s) Oral daily  metoprolol tartrate 25 milliGRAM(s) Oral every 12 hours  polyethylene glycol 3350 17 Gram(s) Oral daily  senna 2 Tablet(s) Oral at bedtime  sulfaSALAzine 1500 milliGRAM(s) Oral two times a day    MEDICATIONS  (PRN):  acetaminophen     Tablet .. 650 milliGRAM(s) Oral every 6 hours PRN Temp greater or equal to 38C (100.4F), Mild Pain (1 - 3)  dextrose Oral Gel 15 Gram(s) Oral once PRN Blood Glucose LESS THAN 70 milliGRAM(s)/deciliter  ondansetron  IVPB 4 milliGRAM(s) IV Intermittent once PRN Nausea and/or Vomiting  oxyCODONE    IR 10 milliGRAM(s) Oral every 4 hours PRN Severe Pain (7 - 10)  oxyCODONE    IR 5 milliGRAM(s) Oral every 4 hours PRN Moderate Pain (4 - 6)  simethicone 80 milliGRAM(s) Chew every 6 hours PRN Indigestion      Allergies    penicillins (Hives)    Intolerances    Ambulation/Activity Status:    amb stairs with pt    RADIOLOGY & ADDITIONAL TESTS:  Diet, NPO:   NPO for Procedure/Test     NPO Start Date: 06-Jul-2022,   NPO Start Time: 08:45  Except Medications (07-06-22 @ 08:48)  ACC: [id number] EXAM:  US ABDOMEN RT UPR QUADRANT                          PROCEDURE DATE:  07/06/2022      INTERPRETATION:  CLINICAL INFORMATION: Elevated liver function test,   status post CABG.    COMPARISON: None available.    TECHNIQUE: Sonography of the right upper quadrant.    FINDINGS:  Liver: Within normal limits.  Bile ducts: Normal caliber. Common bile duct measures .  Gallbladder: Within normal limits.  Pancreas: Visualized portions are within normal limits.  Right kidney: . No hydronephrosis.  Ascites: None.  IVC: Visualized portions are within normal limits.  Other: Right pleural effusion.    IMPRESSION:    Right pleural effusion.    Otherwise, unremarkable right upper quadrant abdominal ultrasound.      ACC: [id number] EXAM:  XR KUB 1 VIEW                          PROCEDURE DATE:  07/06/2022      INTERPRETATION:  Indication: Abdominal distention    Technique: Abdominal radiographs    Comparison: None.    Findings/  impression:    Nonobstructive bowel gas pattern.    Partially imaged left hip prosthesis. Degenerative changes of the spine.    --- End of Report ---      ACC: [id number] EXAM:  XR CHEST PA LAT 2V                          PROCEDURE DATE:  07/06/2022        INTERPRETATION:  Clinical History / Reason for exam: Shortness of breath    Comparison : Chest radiograph July 6, 2022.    Technique/Positioning: Frontal and lateral chest radiograph.    Findings:    Support devices: None.    Cardiac/mediastinum/hilum: Unchanged.    Lung parenchyma/Pleura: Unchanged bilateral pleural effusions and   bibasilar opacities, left greater than right. No pneumothorax.    Skeleton/soft tissues: Unchanged.    Impression:    Unchanged bilateral pleural effusions and bibasilar opacities, left   greater than right.    --- End of Report ---      Assessment/Plan:  61y Male status-post s/p CABG  day 5  - Case and plan discussed with CTU Intensivist and CT Surgeon - Dr. Barrera  - Continue CTU supportive care    - Continue DVT prophylaxis  - Continue GI prophylaxis  - Incentive Spirometry 10 times an hour  - Continue to advance physical activity as tolerated and continue PT/OT as directed  - CAD: Continue ASA, stop statin rising LFT's, continue BB,  Plavix  - Anemia secondary to: Chronic Disease   and Acute PO blood loss anemia,     transfused 2 units PRBC  this admission  - Depressed LV function milrinone weaned off  - patient stable  - HIV continue dapsone dosing and continue antivirals  - HTN - lisinopril and BB  - fluid overload secondary too: acute non cardiac fluid over load - will give lasix  and potassium  - cont colchicine for mild  ST elevation with low grade fever (early pericarditis)  - A. Fib: prophylaxis with magnesium stop amiodarone due to transaminitis   - COPD/Hypoxia: continue duonebs, continue supplemental O2, Incentive spirometry, wean off    - DM/Glucose Control: A1c 4.7 - discontinue insulin drip continue Lantus 30 and Lispro 7--   - ambulate  - Pain - gabapentin   - remove Cordis  - transaminitis- mild  - stop Amiodarone and Satins ; right upper quad sono is neg for acute changes; pt was advised to repeat cmp as an outpatient on 7/11 prior to follow up cts visit on 7/13  constipation- resolved after pt received dulcolax supp; kub is neg for ileus or obstruction     Social Service Disposition:  home today

## 2022-07-06 NOTE — DISCHARGE NOTE PROVIDER - NSDCFUADDAPPT_GEN_ALL_CORE_FT
please  please follow up with dr yoon on 7/13 at 10 am    please call for follow up appointment with cardiologist in 2 weeks and with pmd in 4 weeks

## 2022-07-06 NOTE — PROGRESS NOTE ADULT - SUBJECTIVE AND OBJECTIVE BOX
CTU Attending Progress Daily Note     06 Jul 2022 07:57  Admited 06-29-22, Hospital Day 7d  POD# - 5    HPI:  61 y/old M here for [facility] due to chest pain, + CT ANGIO HEART CORONARY ( CS = 2032 )  PMH: HIV, Formal drug user, RA, GERD, Hep C, Thrombocytopenia, Esophageal spasm         Home Medications:  celecoxib 200 mg oral capsule: 1 cap(s) orally once a day (29 Jun 2022 11:51)  dapsone 100 mg oral tablet: orally 3 times a week m w f  (29 Jun 2022 11:51)  hyoscyamine 0.375 mg oral capsule, extended release: 1 tab(s) orally (29 Jun 2022 11:51)  lisinopril 30 mg oral tablet: 1 tab(s) orally once a day (29 Jun 2022 11:51)  Pepcid 40 mg oral tablet: 1 tab(s) orally once a day (at bedtime) (29 Jun 2022 11:51)  sulfaSALAzine 500 mg oral delayed release tablet: 2 tab(s) orally 2 times a day (29 Jun 2022 11:51)  Triumeq oral tablet: 1 tab(s) orally once a day 600/50/300 mg po takes after meal (29 Jun 2022 11:51)    FAMILY HISTORY:  CAD (coronary artery disease) (Mother)    HTN (hypertension) (Mother, Father)      PAST MEDICAL & SURGICAL HISTORY:  HIV (human immunodeficiency virus infection)  x 25 yrs      Hepatitis C, chronic  dx 2yr no tx per pt      Thrombocytopenia  2 yr plt ct low 32 high 150      History of surgery  ls laminectomy  97 98      History of surgery  rt elbow sx child  bone spur      History of surgery  lt hip i and d 24 yrs        Interval event for past 24 hr:  CHET DAY  61y had no event.   Current Complains:  CHET DAY has no new complains  Allergies    penicillins (Hives)    Intolerances      OBJECTIVE:  Vitals last 24 hrs  T(C): 37.4 (07-06-22 @ 04:00), Max: 37.8 (07-05-22 @ 12:00)  T(F): 99.3 (07-06-22 @ 04:00), Max: 100 (07-05-22 @ 12:00)  HR: 79 (07-06-22 @ 06:00) (73 - 95)  BP: 136/71 (07-06-22 @ 06:00) (88/59 - 153/69)  ABP: --  ABP(mean): --  RR: 19 (07-06-22 @ 06:00) (17 - 25)  SpO2: 94% (07-06-22 @ 06:00) (90% - 97%)      07-05-22 @ 07:01  -  07-06-22 @ 07:00  --------------------------------------------------------  IN:    Oral Fluid: 1360 mL  Total IN: 1360 mL    OUT:    Voided (mL): 2500 mL  Total OUT: 2500 mL    Total NET: -1140 mL             CAPILLARY BLOOD GLUCOSE      POCT Blood Glucose.: 126 mg/dL (06 Jul 2022 06:06)  POCT Blood Glucose.: 131 mg/dL (05 Jul 2022 22:40)  POCT Blood Glucose.: 132 mg/dL (05 Jul 2022 15:21)  POCT Blood Glucose.: 177 mg/dL (05 Jul 2022 10:32)    LABS:                          8.9    5.19  )-----------( 176      ( 06 Jul 2022 06:00 )             26.7     Hemoglobin: 8.9 g/dL (07-06 @ 06:00)  Hemoglobin: 8.0 g/dL (07-05 @ 03:25)  Hemoglobin: 7.4 g/dL (07-04 @ 01:20)    07-06    139  |  102  |  19  ----------------------------<  126<H>  3.9   |  27  |  0.9    Ca    8.8      06 Jul 2022 06:00  Mg     2.1     07-06    TPro  5.8<L>  /  Alb  3.6  /  TBili  0.4  /  DBili  x   /  AST  55<H>  /  ALT  57<H>  /  AlkPhos  339<H>  07-06    Creatinine, Serum: 0.9 mg/dL (07-06 @ 06:00)  Creatinine, Serum: 0.9 mg/dL (07-05 @ 03:25)  Creatinine, Serum: 1.0 mg/dL (07-04 @ 01:20)  Creatinine, Serum: 1.0 mg/dL (07-03 @ 02:51)          HOSPITAL MEDICATIONS:  MEDICATIONS  (STANDING):  abacavir 600 mG/dolutegravir 50 mG/lamivudine 300 mG 1 Tablet(s) Oral daily  aspirin enteric coated 325 milliGRAM(s) Oral daily  bisacodyl 5 milliGRAM(s) Oral every 12 hours  clopidogrel Tablet 75 milliGRAM(s) Oral daily  colchicine 0.6 milliGRAM(s) Oral every 12 hours  dapsone 100 milliGRAM(s) Oral daily  dextrose 5%. 1000 milliLiter(s) (50 mL/Hr) IV Continuous <Continuous>  dextrose 5%. 1000 milliLiter(s) (100 mL/Hr) IV Continuous <Continuous>  dextrose 50% Injectable 50 milliLiter(s) IV Push every 15 minutes  dextrose 50% Injectable 25 milliLiter(s) IV Push every 15 minutes  enoxaparin Injectable 40 milliGRAM(s) SubCutaneous every 24 hours  famotidine    Tablet 20 milliGRAM(s) Oral two times a day  famotidine    Tablet 20 milliGRAM(s) Oral every 12 hours  gabapentin 300 milliGRAM(s) Oral every 8 hours  glucagon  Injectable 1 milliGRAM(s) IntraMuscular once  insulin glargine Injectable (LANTUS) 30 Unit(s) SubCutaneous every morning  insulin lispro (ADMELOG) corrective regimen sliding scale   SubCutaneous three times a day before meals  insulin lispro Injectable (ADMELOG) 7 Unit(s) SubCutaneous three times a day before meals  lisinopril 5 milliGRAM(s) Oral daily  metoprolol tartrate 25 milliGRAM(s) Oral every 12 hours  polyethylene glycol 3350 17 Gram(s) Oral daily  senna 2 Tablet(s) Oral at bedtime  sulfaSALAzine 1500 milliGRAM(s) Oral two times a day    MEDICATIONS  (PRN):  acetaminophen     Tablet .. 650 milliGRAM(s) Oral every 6 hours PRN Temp greater or equal to 38C (100.4F), Mild Pain (1 - 3)  dextrose Oral Gel 15 Gram(s) Oral once PRN Blood Glucose LESS THAN 70 milliGRAM(s)/deciliter  ondansetron  IVPB 4 milliGRAM(s) IV Intermittent once PRN Nausea and/or Vomiting  oxyCODONE    IR 5 milliGRAM(s) Oral every 4 hours PRN Moderate Pain (4 - 6)  oxyCODONE    IR 10 milliGRAM(s) Oral every 4 hours PRN Severe Pain (7 - 10)      REVIEW OF SYSTEMS:  CONSTITUTIONAL: [X] all negative; [ ] weakness, [ ] fevers, [ ] chills  EYES/ENT: [X] all negative; [ ] visual changes, [ ] vertigo, [ ] throat pain, [ ] eye pain  NECK: [X] all negative; [ ] pain, [ ] stiffness  RESPIRATORY: [ ] all negative, [x ] cough, [ ] wheezing, [ ] hemoptysis, [ ] shortness of breath, [x  ] chest pain  CARDIOVASCULAR: [X] all negative; [ ] anginal chest pain, [ ] palpitations, [ ] orthopnea  GASTROINTESTINAL: [X] all negative; [ ]abdominal pain, [ ] nausea, [ ] vomiting, [ ] hematemesis, [ ] diarrhea, [ ] constipation, [ ] melena, [ ] hematochezia.  GENITOURINARY: [X] all negative; [ ] dysuria, [ ] frequency, [ ] hematuria  NEUROLOGICAL: [X] all negative; [ ] numbness, [ ] weakness, [ ] paresthesias  MUSCULOSKELETAL: [X] all negative, [ ] joint pain, [ ] joint swelling, [ ] joint redness, [ ] bone pain  SKIN: [X] all negative; [ ] itching, [ ] burning, [ ] rashes, [ ] lesions   All other review of systems is negative unless indicated above.    [  ] Unable to assess ROS because     PHYSICAL EXAM:          CONSTITUTIONAL: Well-developed; well-nourished; in no acute distress.   	SKIN: warm, dry, no rashes or lesions  	HEENT: Atraumatic. Normocephalic. PERRL. Moist membranes, no conjunctival injection, sclera clear  	NECK: Supple; non tender.  No JVD. No lymphadenopathy.  	CARD: Normal S1, S2. Rate and Rhythm are regular. No murmurs.  	RESP: Good air entry bilaterally, no wheezes, + rales no rhonchi.  	ABD: Soft, not tender, not distended, no CVA tenderness, no rebound no guarding, bowel sounds present  	EXT: Normal ROM.  No clubbing, no cyanosis, no pedal edema, no calf pain b/l, Peripheral pulses intact.  	LYMPH: No acute adenopathy.  	NEURO: Alert, awake, motor 5/5 R, 5/5 L, sensation intact bilat, CN 2-12 intact,          PSYCH: Cooperative, appropriate. Alert & oriented x 3    RADIOLOGY:  X Reviewed and interpreted by me  CxR from 07-06-22 shows mild congestion, no pneumothorax, left effusion, no cardiomegaly,       ECG:  X Reviewed and interpreted by me CTU Attending Progress Daily Note     06 Jul 2022 07:57  Admited 06-29-22, Hospital Day 7d  POD# - 5    HPI:  61 y/old M here for [facility] due to chest pain, + CT ANGIO HEART CORONARY ( CS = 2032 )  PMH: HIV, Formal drug user, RA, GERD, Hep C, Thrombocytopenia, Esophageal spasm         Home Medications:  celecoxib 200 mg oral capsule: 1 cap(s) orally once a day (29 Jun 2022 11:51)  dapsone 100 mg oral tablet: orally 3 times a week m w f  (29 Jun 2022 11:51)  hyoscyamine 0.375 mg oral capsule, extended release: 1 tab(s) orally (29 Jun 2022 11:51)  lisinopril 30 mg oral tablet: 1 tab(s) orally once a day (29 Jun 2022 11:51)  Pepcid 40 mg oral tablet: 1 tab(s) orally once a day (at bedtime) (29 Jun 2022 11:51)  sulfaSALAzine 500 mg oral delayed release tablet: 2 tab(s) orally 2 times a day (29 Jun 2022 11:51)  Triumeq oral tablet: 1 tab(s) orally once a day 600/50/300 mg po takes after meal (29 Jun 2022 11:51)    FAMILY HISTORY:  CAD (coronary artery disease) (Mother)    HTN (hypertension) (Mother, Father)      PAST MEDICAL & SURGICAL HISTORY:  HIV (human immunodeficiency virus infection)  x 25 yrs      Hepatitis C, chronic  dx 2yr no tx per pt      Thrombocytopenia  2 yr plt ct low 32 high 150      History of surgery  ls laminectomy  97 98      History of surgery  rt elbow sx child  bone spur      History of surgery  lt hip i and d 24 yrs        Interval event for past 24 hr:  CHET DAY  61y had no event.   Current Complains:  CHET DAY has no new complains  Allergies    penicillins (Hives)    Intolerances      OBJECTIVE:  Vitals last 24 hrs  T(C): 37.4 (07-06-22 @ 04:00), Max: 37.8 (07-05-22 @ 12:00)  T(F): 99.3 (07-06-22 @ 04:00), Max: 100 (07-05-22 @ 12:00)  HR: 79 (07-06-22 @ 06:00) (73 - 95)  BP: 136/71 (07-06-22 @ 06:00) (88/59 - 153/69)  ABP: --  ABP(mean): --  RR: 19 (07-06-22 @ 06:00) (17 - 25)  SpO2: 94% (07-06-22 @ 06:00) (90% - 97%)      07-05-22 @ 07:01  -  07-06-22 @ 07:00  --------------------------------------------------------  IN:    Oral Fluid: 1360 mL  Total IN: 1360 mL    OUT:    Voided (mL): 2500 mL  Total OUT: 2500 mL    Total NET: -1140 mL             CAPILLARY BLOOD GLUCOSE      POCT Blood Glucose.: 126 mg/dL (06 Jul 2022 06:06)  POCT Blood Glucose.: 131 mg/dL (05 Jul 2022 22:40)  POCT Blood Glucose.: 132 mg/dL (05 Jul 2022 15:21)  POCT Blood Glucose.: 177 mg/dL (05 Jul 2022 10:32)    LABS:                          8.9    5.19  )-----------( 176      ( 06 Jul 2022 06:00 )             26.7     Hemoglobin: 8.9 g/dL (07-06 @ 06:00)  Hemoglobin: 8.0 g/dL (07-05 @ 03:25)  Hemoglobin: 7.4 g/dL (07-04 @ 01:20)    07-06    139  |  102  |  19  ----------------------------<  126<H>  3.9   |  27  |  0.9    Ca    8.8      06 Jul 2022 06:00  Mg     2.1     07-06    TPro  5.8<L>  /  Alb  3.6  /  TBili  0.4  /  DBili  x   /  AST  55<H>  /  ALT  57<H>  /  AlkPhos  339<H>  07-06    Creatinine, Serum: 0.9 mg/dL (07-06 @ 06:00)  Creatinine, Serum: 0.9 mg/dL (07-05 @ 03:25)  Creatinine, Serum: 1.0 mg/dL (07-04 @ 01:20)  Creatinine, Serum: 1.0 mg/dL (07-03 @ 02:51)          HOSPITAL MEDICATIONS:  MEDICATIONS  (STANDING):  abacavir 600 mG/dolutegravir 50 mG/lamivudine 300 mG 1 Tablet(s) Oral daily  aspirin enteric coated 325 milliGRAM(s) Oral daily  bisacodyl 5 milliGRAM(s) Oral every 12 hours  clopidogrel Tablet 75 milliGRAM(s) Oral daily  colchicine 0.6 milliGRAM(s) Oral every 12 hours  dapsone 100 milliGRAM(s) Oral daily  dextrose 5%. 1000 milliLiter(s) (50 mL/Hr) IV Continuous <Continuous>  dextrose 5%. 1000 milliLiter(s) (100 mL/Hr) IV Continuous <Continuous>  dextrose 50% Injectable 50 milliLiter(s) IV Push every 15 minutes  dextrose 50% Injectable 25 milliLiter(s) IV Push every 15 minutes  enoxaparin Injectable 40 milliGRAM(s) SubCutaneous every 24 hours  famotidine    Tablet 20 milliGRAM(s) Oral two times a day  famotidine    Tablet 20 milliGRAM(s) Oral every 12 hours  gabapentin 300 milliGRAM(s) Oral every 8 hours  glucagon  Injectable 1 milliGRAM(s) IntraMuscular once  insulin glargine Injectable (LANTUS) 30 Unit(s) SubCutaneous every morning  insulin lispro (ADMELOG) corrective regimen sliding scale   SubCutaneous three times a day before meals  insulin lispro Injectable (ADMELOG) 7 Unit(s) SubCutaneous three times a day before meals  lisinopril 5 milliGRAM(s) Oral daily  metoprolol tartrate 25 milliGRAM(s) Oral every 12 hours  polyethylene glycol 3350 17 Gram(s) Oral daily  senna 2 Tablet(s) Oral at bedtime  sulfaSALAzine 1500 milliGRAM(s) Oral two times a day    MEDICATIONS  (PRN):  acetaminophen     Tablet .. 650 milliGRAM(s) Oral every 6 hours PRN Temp greater or equal to 38C (100.4F), Mild Pain (1 - 3)  dextrose Oral Gel 15 Gram(s) Oral once PRN Blood Glucose LESS THAN 70 milliGRAM(s)/deciliter  ondansetron  IVPB 4 milliGRAM(s) IV Intermittent once PRN Nausea and/or Vomiting  oxyCODONE    IR 5 milliGRAM(s) Oral every 4 hours PRN Moderate Pain (4 - 6)  oxyCODONE    IR 10 milliGRAM(s) Oral every 4 hours PRN Severe Pain (7 - 10)      REVIEW OF SYSTEMS:  CONSTITUTIONAL: [X] all negative; [ ] weakness, [ ] fevers, [ ] chills  EYES/ENT: [X] all negative; [ ] visual changes, [ ] vertigo, [ ] throat pain, [ ] eye pain  NECK: [X] all negative; [ ] pain, [ ] stiffness  RESPIRATORY: [ ] all negative, [x ] cough, [ ] wheezing, [ ] hemoptysis, [ ] shortness of breath, [x  ] chest pain  CARDIOVASCULAR: [X] all negative; [ ] anginal chest pain, [ ] palpitations, [ ] orthopnea  GASTROINTESTINAL: [X] all negative; [ ]abdominal pain, [ ] nausea, [ ] vomiting, [ ] hematemesis, [ ] diarrhea, [ ] constipation, [ ] melena, [ ] hematochezia.  GENITOURINARY: [X] all negative; [ ] dysuria, [ ] frequency, [ ] hematuria  NEUROLOGICAL: [X] all negative; [ ] numbness, [ ] weakness, [ ] paresthesias  MUSCULOSKELETAL: [X] all negative, [ ] joint pain, [ ] joint swelling, [ ] joint redness, [ ] bone pain  SKIN: [X] all negative; [ ] itching, [ ] burning, [ ] rashes, [ ] lesions   All other review of systems is negative unless indicated above.    [  ] Unable to assess ROS because     PHYSICAL EXAM:          CONSTITUTIONAL: Well-developed; well-nourished; in no acute distress.   	SKIN: warm, dry, no rashes or lesions  	HEENT: Atraumatic. Normocephalic. PERRL. Moist membranes, no conjunctival injection, sclera clear  	NECK: Supple; non tender.  No JVD. No lymphadenopathy.  	CARD: Normal S1, S2. Rate and Rhythm are regular. No murmurs.  	RESP: Good air entry bilaterally, no wheezes, + rales no rhonchi.  	ABD: Soft, not tender, mildly distended, no CVA tenderness, no rebound no guarding, bowel sounds present  	EXT: Normal ROM.  No clubbing, no cyanosis, trace pedal edema, no calf pain b/l, Peripheral pulses intact.  	LYMPH: No acute adenopathy.  	NEURO: Alert, awake, motor 5/5 R, 5/5 L, sensation intact bilat, CN 2-12 intact,          PSYCH: Cooperative, appropriate. Alert & oriented x 3    RADIOLOGY:  X Reviewed and interpreted by me  CxR from 07-06-22 shows mild congestion, no pneumothorax, small left effusion, no cardiomegaly,       ECG:  X Reviewed and interpreted by me NSR 81 QTc - 476

## 2022-07-06 NOTE — DISCHARGE NOTE PROVIDER - CARE PROVIDERS DIRECT ADDRESSES
,[email or web address],DirectAddress_Unknown,DirectAddress_Unknown

## 2022-07-07 ENCOUNTER — TRANSCRIPTION ENCOUNTER (OUTPATIENT)
Age: 62
End: 2022-07-07

## 2022-07-08 ENCOUNTER — TRANSCRIPTION ENCOUNTER (OUTPATIENT)
Age: 62
End: 2022-07-08

## 2022-07-08 ENCOUNTER — EMERGENCY (EMERGENCY)
Facility: HOSPITAL | Age: 62
LOS: 0 days | Discharge: HOME | End: 2022-07-09
Attending: EMERGENCY MEDICINE | Admitting: EMERGENCY MEDICINE

## 2022-07-08 VITALS
DIASTOLIC BLOOD PRESSURE: 84 MMHG | HEIGHT: 70 IN | TEMPERATURE: 97 F | HEART RATE: 66 BPM | RESPIRATION RATE: 18 BRPM | WEIGHT: 160.06 LBS | OXYGEN SATURATION: 95 % | SYSTOLIC BLOOD PRESSURE: 176 MMHG

## 2022-07-08 DIAGNOSIS — Z95.1 PRESENCE OF AORTOCORONARY BYPASS GRAFT: ICD-10-CM

## 2022-07-08 DIAGNOSIS — I31.3 PERICARDIAL EFFUSION (NONINFLAMMATORY): ICD-10-CM

## 2022-07-08 DIAGNOSIS — F17.210 NICOTINE DEPENDENCE, CIGARETTES, UNCOMPLICATED: ICD-10-CM

## 2022-07-08 DIAGNOSIS — I10 ESSENTIAL (PRIMARY) HYPERTENSION: ICD-10-CM

## 2022-07-08 DIAGNOSIS — Z20.822 CONTACT WITH AND (SUSPECTED) EXPOSURE TO COVID-19: ICD-10-CM

## 2022-07-08 DIAGNOSIS — R06.02 SHORTNESS OF BREATH: ICD-10-CM

## 2022-07-08 DIAGNOSIS — Z98.890 OTHER SPECIFIED POSTPROCEDURAL STATES: Chronic | ICD-10-CM

## 2022-07-08 DIAGNOSIS — Z88.0 ALLERGY STATUS TO PENICILLIN: ICD-10-CM

## 2022-07-08 DIAGNOSIS — B20 HUMAN IMMUNODEFICIENCY VIRUS [HIV] DISEASE: ICD-10-CM

## 2022-07-08 DIAGNOSIS — Z82.49 FAMILY HISTORY OF ISCHEMIC HEART DISEASE AND OTHER DISEASES OF THE CIRCULATORY SYSTEM: ICD-10-CM

## 2022-07-08 DIAGNOSIS — R07.9 CHEST PAIN, UNSPECIFIED: ICD-10-CM

## 2022-07-08 DIAGNOSIS — I25.10 ATHEROSCLEROTIC HEART DISEASE OF NATIVE CORONARY ARTERY WITHOUT ANGINA PECTORIS: ICD-10-CM

## 2022-07-08 DIAGNOSIS — J90 PLEURAL EFFUSION, NOT ELSEWHERE CLASSIFIED: ICD-10-CM

## 2022-07-08 LAB
ALBUMIN SERPL ELPH-MCNC: 4.1 G/DL — SIGNIFICANT CHANGE UP (ref 3.5–5.2)
ALP SERPL-CCNC: 244 U/L — HIGH (ref 30–115)
ALT FLD-CCNC: 30 U/L — SIGNIFICANT CHANGE UP (ref 0–41)
ANION GAP SERPL CALC-SCNC: 12 MMOL/L — SIGNIFICANT CHANGE UP (ref 7–14)
AST SERPL-CCNC: 22 U/L — SIGNIFICANT CHANGE UP (ref 0–41)
BASE EXCESS BLDV CALC-SCNC: 8.6 MMOL/L — HIGH (ref -2–3)
BASOPHILS # BLD AUTO: 0.03 K/UL — SIGNIFICANT CHANGE UP (ref 0–0.2)
BASOPHILS NFR BLD AUTO: 0.5 % — SIGNIFICANT CHANGE UP (ref 0–1)
BILIRUB SERPL-MCNC: 0.6 MG/DL — SIGNIFICANT CHANGE UP (ref 0.2–1.2)
BUN SERPL-MCNC: 10 MG/DL — SIGNIFICANT CHANGE UP (ref 10–20)
CA-I SERPL-SCNC: 1.17 MMOL/L — SIGNIFICANT CHANGE UP (ref 1.15–1.33)
CALCIUM SERPL-MCNC: 9.3 MG/DL — SIGNIFICANT CHANGE UP (ref 8.5–10.1)
CHLORIDE SERPL-SCNC: 101 MMOL/L — SIGNIFICANT CHANGE UP (ref 98–110)
CO2 SERPL-SCNC: 28 MMOL/L — SIGNIFICANT CHANGE UP (ref 17–32)
CREAT SERPL-MCNC: 0.8 MG/DL — SIGNIFICANT CHANGE UP (ref 0.7–1.5)
EGFR: 102 ML/MIN/1.73M2 — SIGNIFICANT CHANGE UP
EOSINOPHIL # BLD AUTO: 0.07 K/UL — SIGNIFICANT CHANGE UP (ref 0–0.7)
EOSINOPHIL NFR BLD AUTO: 1.2 % — SIGNIFICANT CHANGE UP (ref 0–8)
GAS PNL BLDV: 137 MMOL/L — SIGNIFICANT CHANGE UP (ref 136–145)
GAS PNL BLDV: SIGNIFICANT CHANGE UP
GLUCOSE SERPL-MCNC: 122 MG/DL — HIGH (ref 70–99)
HCO3 BLDV-SCNC: 33 MMOL/L — HIGH (ref 22–29)
HCT VFR BLD CALC: 29.2 % — LOW (ref 42–52)
HCT VFR BLDA CALC: 24 % — LOW (ref 39–51)
HGB BLD CALC-MCNC: 7.9 G/DL — LOW (ref 12.6–17.4)
HGB BLD-MCNC: 9.6 G/DL — LOW (ref 14–18)
IMM GRANULOCYTES NFR BLD AUTO: 0.5 % — HIGH (ref 0.1–0.3)
LACTATE BLDV-MCNC: 1.4 MMOL/L — SIGNIFICANT CHANGE UP (ref 0.5–2)
LIDOCAIN IGE QN: 39 U/L — SIGNIFICANT CHANGE UP (ref 7–60)
LYMPHOCYTES # BLD AUTO: 0.78 K/UL — LOW (ref 1.2–3.4)
LYMPHOCYTES # BLD AUTO: 13.4 % — LOW (ref 20.5–51.1)
MAGNESIUM SERPL-MCNC: 2.1 MG/DL — SIGNIFICANT CHANGE UP (ref 1.8–2.4)
MCHC RBC-ENTMCNC: 28.1 PG — SIGNIFICANT CHANGE UP (ref 27–31)
MCHC RBC-ENTMCNC: 32.9 G/DL — SIGNIFICANT CHANGE UP (ref 32–37)
MCV RBC AUTO: 85.4 FL — SIGNIFICANT CHANGE UP (ref 80–94)
MONOCYTES # BLD AUTO: 0.74 K/UL — HIGH (ref 0.1–0.6)
MONOCYTES NFR BLD AUTO: 12.7 % — HIGH (ref 1.7–9.3)
NEUTROPHILS # BLD AUTO: 4.16 K/UL — SIGNIFICANT CHANGE UP (ref 1.4–6.5)
NEUTROPHILS NFR BLD AUTO: 71.7 % — SIGNIFICANT CHANGE UP (ref 42.2–75.2)
NRBC # BLD: 0 /100 WBCS — SIGNIFICANT CHANGE UP (ref 0–0)
NT-PROBNP SERPL-SCNC: 1950 PG/ML — HIGH (ref 0–300)
PCO2 BLDV: 43 MMHG — SIGNIFICANT CHANGE UP (ref 42–55)
PH BLDV: 7.49 — HIGH (ref 7.32–7.43)
PLATELET # BLD AUTO: 221 K/UL — SIGNIFICANT CHANGE UP (ref 130–400)
PO2 BLDV: 34 MMHG — SIGNIFICANT CHANGE UP
POTASSIUM BLDV-SCNC: 4.2 MMOL/L — SIGNIFICANT CHANGE UP (ref 3.5–5.1)
POTASSIUM SERPL-MCNC: 4.7 MMOL/L — SIGNIFICANT CHANGE UP (ref 3.5–5)
POTASSIUM SERPL-SCNC: 4.7 MMOL/L — SIGNIFICANT CHANGE UP (ref 3.5–5)
PROT SERPL-MCNC: 6.5 G/DL — SIGNIFICANT CHANGE UP (ref 6–8)
RBC # BLD: 3.42 M/UL — LOW (ref 4.7–6.1)
RBC # FLD: 15.5 % — HIGH (ref 11.5–14.5)
SAO2 % BLDV: 55.7 % — SIGNIFICANT CHANGE UP
SARS-COV-2 RNA SPEC QL NAA+PROBE: SIGNIFICANT CHANGE UP
SODIUM SERPL-SCNC: 141 MMOL/L — SIGNIFICANT CHANGE UP (ref 135–146)
TROPONIN T SERPL-MCNC: 0.36 NG/ML — CRITICAL HIGH
WBC # BLD: 5.81 K/UL — SIGNIFICANT CHANGE UP (ref 4.8–10.8)
WBC # FLD AUTO: 5.81 K/UL — SIGNIFICANT CHANGE UP (ref 4.8–10.8)

## 2022-07-08 PROCEDURE — 32554 ASPIRATE PLEURA W/O IMAGING: CPT

## 2022-07-08 PROCEDURE — 93010 ELECTROCARDIOGRAM REPORT: CPT | Mod: 76

## 2022-07-08 PROCEDURE — 99220: CPT | Mod: 25

## 2022-07-08 PROCEDURE — 71045 X-RAY EXAM CHEST 1 VIEW: CPT | Mod: 26,77,76,59

## 2022-07-08 PROCEDURE — 71045 X-RAY EXAM CHEST 1 VIEW: CPT | Mod: 26,59

## 2022-07-08 RX ORDER — OXYCODONE AND ACETAMINOPHEN 5; 325 MG/1; MG/1
1 TABLET ORAL ONCE
Refills: 0 | Status: DISCONTINUED | OUTPATIENT
Start: 2022-07-08 | End: 2022-07-08

## 2022-07-08 RX ORDER — METOPROLOL TARTRATE 50 MG
25 TABLET ORAL EVERY 12 HOURS
Refills: 0 | Status: DISCONTINUED | OUTPATIENT
Start: 2022-07-08 | End: 2022-07-09

## 2022-07-08 RX ORDER — ABACAVIR 20 MG/ML
600 SOLUTION ORAL DAILY
Refills: 0 | Status: DISCONTINUED | OUTPATIENT
Start: 2022-07-08 | End: 2022-07-09

## 2022-07-08 RX ORDER — COLCHICINE 0.6 MG
0.6 TABLET ORAL EVERY 12 HOURS
Refills: 0 | Status: DISCONTINUED | OUTPATIENT
Start: 2022-07-08 | End: 2022-07-09

## 2022-07-08 RX ORDER — DOLUTEGRAVIR SODIUM 25 MG/1
50 TABLET, FILM COATED ORAL DAILY
Refills: 0 | Status: DISCONTINUED | OUTPATIENT
Start: 2022-07-08 | End: 2022-07-09

## 2022-07-08 RX ORDER — MORPHINE SULFATE 50 MG/1
4 CAPSULE, EXTENDED RELEASE ORAL ONCE
Refills: 0 | Status: DISCONTINUED | OUTPATIENT
Start: 2022-07-08 | End: 2022-07-08

## 2022-07-08 RX ORDER — CLOPIDOGREL BISULFATE 75 MG/1
75 TABLET, FILM COATED ORAL DAILY
Refills: 0 | Status: DISCONTINUED | OUTPATIENT
Start: 2022-07-08 | End: 2022-07-09

## 2022-07-08 RX ORDER — MORPHINE SULFATE 50 MG/1
2 CAPSULE, EXTENDED RELEASE ORAL
Refills: 0 | Status: DISCONTINUED | OUTPATIENT
Start: 2022-07-08 | End: 2022-07-08

## 2022-07-08 RX ORDER — HYDROMORPHONE HYDROCHLORIDE 2 MG/ML
1 INJECTION INTRAMUSCULAR; INTRAVENOUS; SUBCUTANEOUS ONCE
Refills: 0 | Status: DISCONTINUED | OUTPATIENT
Start: 2022-07-08 | End: 2022-07-08

## 2022-07-08 RX ORDER — HYDROMORPHONE HYDROCHLORIDE 2 MG/ML
0.5 INJECTION INTRAMUSCULAR; INTRAVENOUS; SUBCUTANEOUS ONCE
Refills: 0 | Status: DISCONTINUED | OUTPATIENT
Start: 2022-07-08 | End: 2022-07-08

## 2022-07-08 RX ORDER — ASPIRIN/CALCIUM CARB/MAGNESIUM 324 MG
325 TABLET ORAL DAILY
Refills: 0 | Status: DISCONTINUED | OUTPATIENT
Start: 2022-07-08 | End: 2022-07-09

## 2022-07-08 RX ORDER — LAMIVUDINE 150 MG
300 TABLET ORAL DAILY
Refills: 0 | Status: DISCONTINUED | OUTPATIENT
Start: 2022-07-08 | End: 2022-07-09

## 2022-07-08 RX ORDER — FUROSEMIDE 40 MG
40 TABLET ORAL ONCE
Refills: 0 | Status: COMPLETED | OUTPATIENT
Start: 2022-07-08 | End: 2022-07-08

## 2022-07-08 RX ORDER — LISINOPRIL 2.5 MG/1
30 TABLET ORAL DAILY
Refills: 0 | Status: DISCONTINUED | OUTPATIENT
Start: 2022-07-08 | End: 2022-07-09

## 2022-07-08 RX ADMIN — HYDROMORPHONE HYDROCHLORIDE 0.5 MILLIGRAM(S): 2 INJECTION INTRAMUSCULAR; INTRAVENOUS; SUBCUTANEOUS at 17:56

## 2022-07-08 RX ADMIN — MORPHINE SULFATE 4 MILLIGRAM(S): 50 CAPSULE, EXTENDED RELEASE ORAL at 16:26

## 2022-07-08 RX ADMIN — OXYCODONE AND ACETAMINOPHEN 1 TABLET(S): 5; 325 TABLET ORAL at 16:26

## 2022-07-08 RX ADMIN — DOLUTEGRAVIR SODIUM 50 MILLIGRAM(S): 25 TABLET, FILM COATED ORAL at 16:45

## 2022-07-08 RX ADMIN — MORPHINE SULFATE 2 MILLIGRAM(S): 50 CAPSULE, EXTENDED RELEASE ORAL at 08:15

## 2022-07-08 RX ADMIN — MORPHINE SULFATE 2 MILLIGRAM(S): 50 CAPSULE, EXTENDED RELEASE ORAL at 07:51

## 2022-07-08 RX ADMIN — MORPHINE SULFATE 4 MILLIGRAM(S): 50 CAPSULE, EXTENDED RELEASE ORAL at 10:01

## 2022-07-08 RX ADMIN — OXYCODONE AND ACETAMINOPHEN 1 TABLET(S): 5; 325 TABLET ORAL at 16:30

## 2022-07-08 RX ADMIN — Medication 0.6 MILLIGRAM(S): at 17:07

## 2022-07-08 RX ADMIN — OXYCODONE AND ACETAMINOPHEN 1 TABLET(S): 5; 325 TABLET ORAL at 17:00

## 2022-07-08 RX ADMIN — Medication 300 MILLIGRAM(S): at 16:45

## 2022-07-08 RX ADMIN — MORPHINE SULFATE 4 MILLIGRAM(S): 50 CAPSULE, EXTENDED RELEASE ORAL at 10:15

## 2022-07-08 RX ADMIN — Medication 40 MILLIGRAM(S): at 10:21

## 2022-07-08 RX ADMIN — HYDROMORPHONE HYDROCHLORIDE 1 MILLIGRAM(S): 2 INJECTION INTRAMUSCULAR; INTRAVENOUS; SUBCUTANEOUS at 19:49

## 2022-07-08 RX ADMIN — Medication 25 MILLIGRAM(S): at 17:56

## 2022-07-08 RX ADMIN — Medication 100 MILLIGRAM(S): at 22:38

## 2022-07-08 RX ADMIN — ABACAVIR 600 MILLIGRAM(S): 20 SOLUTION ORAL at 16:45

## 2022-07-08 RX ADMIN — HYDROMORPHONE HYDROCHLORIDE 1 MILLIGRAM(S): 2 INJECTION INTRAMUSCULAR; INTRAVENOUS; SUBCUTANEOUS at 20:05

## 2022-07-08 RX ADMIN — OXYCODONE AND ACETAMINOPHEN 1 TABLET(S): 5; 325 TABLET ORAL at 16:05

## 2022-07-08 RX ADMIN — Medication 100 MILLIGRAM(S): at 16:45

## 2022-07-08 RX ADMIN — MORPHINE SULFATE 4 MILLIGRAM(S): 50 CAPSULE, EXTENDED RELEASE ORAL at 17:50

## 2022-07-08 RX ADMIN — HYDROMORPHONE HYDROCHLORIDE 0.5 MILLIGRAM(S): 2 INJECTION INTRAMUSCULAR; INTRAVENOUS; SUBCUTANEOUS at 18:15

## 2022-07-08 NOTE — ED PROVIDER NOTE - CARE PLAN
Assessment and plan of treatment:	A/P: Cocnern for accumulating effusions causing respiratory distress. Concern for pericarditis, less so for ongoing ACS given recent CABG and minimal chest pain and EKG findings. Eval also for pneumonia given chills and possible though less likely infectious source. Labs, imaging, symptom control, reassess.   1 Principal Discharge DX:	Pleural effusion, left  Assessment and plan of treatment:	A/P: Cocnern for accumulating effusions causing respiratory distress. Concern for pericarditis, less so for ongoing ACS given recent CABG and minimal chest pain and EKG findings. Eval also for pneumonia given chills and possible though less likely infectious source. Labs, imaging, symptom control, reassess.  Secondary Diagnosis:	S/P CABG x 1  Secondary Diagnosis:	Acute dyspnea  Secondary Diagnosis:	HIV disease

## 2022-07-08 NOTE — ED ADULT NURSE REASSESSMENT NOTE - NS ED NURSE REASSESS COMMENT FT1
Patient needs assessed and met at this time. Assisted to bathroom with O2. Patient reports feeling better but is having difficulty getting comfortable.

## 2022-07-08 NOTE — ED PROVIDER NOTE - NSICDXFAMILYHX_GEN_ALL_CORE_FT
FAMILY HISTORY:  Father  Still living? Unknown  HTN (hypertension), Age at diagnosis: Age Unknown    Mother  Still living? Unknown  CAD (coronary artery disease), Age at diagnosis: Age Unknown  HTN (hypertension), Age at diagnosis: Age Unknown

## 2022-07-08 NOTE — ED ADULT TRIAGE NOTE - CHIEF COMPLAINT QUOTE
PT presents to the ED c/o of SOB. Pt states he just had bypass surgery Last Friday 7/1 and was just Discharged this Wednesday 7/6. Since being home pt has been having difficulty sleeping due to his SOB.

## 2022-07-08 NOTE — ED PROVIDER NOTE - CLINICAL SUMMARY MEDICAL DECISION MAKING FREE TEXT BOX
pw increasing chest pain and dyspnea. Elevated troponin, Cocnern for accumulating effusions causing respiratory distress, Required Drainage by CT surgery and placement in Obs. Concern for pericarditis, less so for ongoing ACS or myocarditis given recent CABG and minimal chest pain and EKG findings and Echo showing no significant wall motion abnormality or significantly depressed EF. Eval also for pneumonia given chills and possible though less likely infectious source was negative. Patient to be placed into observation status. There is a lack of diagnostic certainty, where a more precise diagnosis could decide inpatient admission or discharge to home, and/or need for therapeutic intensity, where extensive therapy has a reasonable possibility of abating the patient's presenting condition, and thereby prevents inpatient admission.

## 2022-07-08 NOTE — PROCEDURE NOTE - NSPROCDETAILS_GEN_ALL_CORE
location identified, draped/prepped, sterile technique used, needle inserted/introduced/catheter inserted over needle/connection to syringe/ultrasound assessment of effusion (localization)

## 2022-07-08 NOTE — ED PROVIDER NOTE - NSICDXPASTSURGICALHX_GEN_ALL_CORE_FT
PAST SURGICAL HISTORY:  History of surgery ls laminectomy  97 98    History of surgery rt elbow sx child  bone spur    History of surgery lt hip i and d 24 yrs

## 2022-07-08 NOTE — ED ADULT NURSE REASSESSMENT NOTE - NS ED NURSE REASSESS COMMENT FT1
pt resting in bed at this time. denies any pain or discomfort. v/s stable. will continue to monitor.

## 2022-07-08 NOTE — ED CDU PROVIDER INITIAL DAY NOTE - MEDICAL DECISION MAKING DETAILS
61-year-old man, history of hypertension, HIV with undetectable viral load, CAD status post CABG 7/01 by Dr. Barrera was placed in CDU for observation after an ED thoracentesis 2/2 pleural effusion. Patient had complained of feeling increased chest tightness and shortness of breath after discharge, despite Lasix at home. After the thoracentesis he denies johan chest pain, however he does complain of back pain at the site of the thoracentesis. CXR post procedure was negative for PTX and showed significant improvement in effusion. On exam pt looks uncomfortable, splinting to breathe, but afebrile and nontoxic-appearing, and saturating well on 2L.  Lungs are clear, CV S1-S2, RRR, abdomen soft, nontender.  Plan is for observation and pain control, repeat chest x-ray, reassess.  CT surgery is following.

## 2022-07-08 NOTE — ED PROVIDER NOTE - NS ED ROS FT
Constitutional: (-) fever, (+) chills  Eyes/ENT: (-) blurry vision, (-) epistaxis, (-) sore throat  Cardiovascular: (+) chest pain, (-) syncope  Respiratory: (-) cough, (+) shortness of breath  Gastrointestinal: (-) abdominal pain, (-) vomiting, (-) diarrhea  Musculoskeletal: (-) neck pain, (-) back pain, (-) joint pain  Integumentary: (-) rash, (-) edema  Neurological: (-) headache, (-) altered mental status  : (-) dysuria, hematuria.  Allergic/Immunologic: (-) pruritus, rash

## 2022-07-08 NOTE — ED PROVIDER NOTE - PHYSICAL EXAMINATION
Vital Signs: I have reviewed the initial vital signs.  Constitutional: NAD, appears stated age.  HEENT: Airway patent, moist MM, no erythema/swelling/deformity of oral structures. EOMI, PERRLA.  CV: regular rate, regular rhythm, well-perfused extremities, 2+ b/l DP and radial pulses equal.  Lungs: Bibasliar crackles, visible dyspnea, no pursed lip breathing, retractions, or tripodding.   ABD: NTND, no guarding or rebound, no pulsatile mass, no hernias.   MSK: Neck supple, nontender, nl ROM, no stepoff. Chest nontender. Back nontender in TLS spine or to b/l bony structures or flanks. Ext nontender, nl rom, no deformity.   INTEG: Skin warm, dry, no rash.  NEURO: A&Ox3, normal strength, nl sensation throughout, normal speech.   PSYCH: Calm, cooperative, normal affect and interaction.

## 2022-07-08 NOTE — ED CDU PROVIDER INITIAL DAY NOTE - OBJECTIVE STATEMENT
62 yo male with a pmh of HTN, CAD s/p CABG 7/01 by Dr. Jones, and HIV (undetectable viral load) presents c/o chest tightness and SOB. pt states he was discharged on Wednesday and has noted the SOB since late that night. pt states the SOB has been progressively worsening and he also experienced chills. pt states his was given lasix and home O2 by home nurse but he did not experience any resolution. pt states pt denies any other symptoms including headache, numbness/tingling, recent illness/travel, cough, abdominal pain, or n/v/d.

## 2022-07-08 NOTE — ED CDU PROVIDER INITIAL DAY NOTE - PROGRESS NOTE DETAILS
received signout from Edu Young - pt in obs post cabg 7/1 for sob; found to have bilat pleural effusions - left thoracentesis performed; pt feels better; seen by ct surgery; repeat cxr in am, monitor overnight;

## 2022-07-08 NOTE — ED ADULT NURSE NOTE - CCCP TRG CHIEF CMPLNT
[facility] notified that equipment is not needed today. Will provide update at end of week.    shortness of breath

## 2022-07-08 NOTE — ED PROVIDER NOTE - OBJECTIVE STATEMENT
61y M PMH HTN, HIV, recent CABG 7/1 by Dr. Jones, Cardiologist Dr. Woo, pw chest tightness and increasing SOB, moderate, worsening, nonradiating, associated with lying flat and activity, preventing sleep, interventions required included Starting Home O2 by visiting Nurse 2L which relieved dyspnea partially but did not resolve his inability to sleep due to discomfort. Went for outpt workup 2 days ago with discovery of pleural effusions bilateral on RUQ US and CXR. Also feeling Chills, temp measured 99F at home. No numbness, tingling, focal weakness, nausea, vomiting, diarrhea, constipation or obstipation. 61y M PMH HTN, HIV (undetectable), recent CABG 7/1 by Dr. Jones, Cardiologist Dr. Woo, pw chest tightness and increasing SOB, moderate, worsening, nonradiating, associated with lying flat and activity, preventing sleep, interventions required included Starting Home O2 by visiting Nurse 2L which relieved dyspnea partially but did not resolve his inability to sleep due to discomfort. Went for outpt workup 2 days ago with discovery of pleural effusions bilateral on RUQ US and CXR. Also feeling Chills, temp measured 99F at home. No numbness, tingling, focal weakness, nausea, vomiting, diarrhea, constipation or obstipation.

## 2022-07-08 NOTE — ED CDU PROVIDER INITIAL DAY NOTE - NS ED ATTENDING STATEMENT MOD
This was a shared visit with the RIAZ. I reviewed and verified the documentation and independently performed the documented:

## 2022-07-08 NOTE — ED PROVIDER NOTE - NSICDXPASTMEDICALHX_GEN_ALL_CORE_FT
PAST MEDICAL HISTORY:  Hepatitis C, chronic dx 2yr no tx per pt    HIV (human immunodeficiency virus infection) x 25 yrs    Thrombocytopenia 2 yr plt ct low 32 high 150

## 2022-07-08 NOTE — ED PROVIDER NOTE - PLAN OF CARE
A/P: Cocnern for accumulating effusions causing respiratory distress. Concern for pericarditis, less so for ongoing ACS given recent CABG and minimal chest pain and EKG findings. Eval also for pneumonia given chills and possible though less likely infectious source. Labs, imaging, symptom control, reassess.

## 2022-07-08 NOTE — ED PROVIDER NOTE - PROGRESS NOTE DETAILS
CT surgery is in the ED. Seeing patient. Echo shows trace Pericardial effusion, Large Left pleural effusion. Pending their plan. Authored by Dr. Duke: CT surgery performed thoracentesis, 750 cc serosanguinous fluid drained. Pending post thorac CXR. Pt will be placed in obs.

## 2022-07-08 NOTE — ED ADULT TRIAGE NOTE - BRAND OF COVID-19 VACCINATION
Spoke with patient and relayed below information.     Has cuff at home. Will get BP for next few days, write it donw, so it can be discussed with PCP at Virual visit.   Has had problems connecting with that previously.   Advised RN will send him these instructions via e-advise.   Visit on 11/13/20 changed to a virtual appt - telephone #: [phone] (M)  Verbalized understanding.        Pfizer dose 1 and 2

## 2022-07-08 NOTE — ED CLERICAL - NS ED CLERK NOTE PRE-ARRIVAL INFORMATION; ADDITIONAL PRE-ARRIVAL INFORMATION
This patient is enrolled in the Follow Your [facility] program and has undergone a cardiac surgery procedure within the last 30 days and has active care navigation.   This patient can be followed up by the care navigation team within 24 hours. To arrange close follow-up or to obtain additional clinical information about this patient, please call the contact number above.   Please call the cardiac surgery team once patient is registered for consultation PRIOR to disposition decision.  The patient recently underwent a cardiac surgery procedure and the team can assist in acute medical management.

## 2022-07-08 NOTE — PROGRESS NOTE ADULT - SUBJECTIVE AND OBJECTIVE BOX
OPERATIVE PROCEDURE(s):       CABG     1 week ago                    61yMale  SURGEON(s): Bruce  SUBJECTIVE ASSESSMENT:  Patient has no complaints at this time.    Vital Signs Last 24 Hrs  T(F): 97.7 (08 Jul 2022 07:34), Max: 97.7 (08 Jul 2022 07:34)  HR: 84 (08 Jul 2022 07:34) (66 - 84)  BP: 155/83 (08 Jul 2022 07:34) (155/83 - 176/84)  RR: 18 (08 Jul 2022 04:11) (18 - 18)  SpO2: 96% (08 Jul 2022 07:34) (95% - 96%) RA      I&O's Detail    Net:   I&O's Detail      Physical Exam:  General: NAD; A&Ox3  Cardiac: S1/S2, RRR, no murmur, no rubs  Lungs: unlabored respirations, CTA b/l, no wheeze, no rales, no crackles  Abdomen: Soft/NT/ND; positive bowel sounds x 4  Sternum: Intact, no click, incision healing well with no drainage  Incisions: Incisions clean/dry/intact  Extremities: No edema b/l lower extremities; good capillary refill; no cyanosis; palpable 1+ pedal pulses b/l        LABS:                        9.6<L>  5.81  )-----------( 221      ( 08 Jul 2022 07:50 )             29.2<L>                        8.9<L>  5.19  )-----------( 176      ( 06 Jul 2022 06:00 )             26.7<L>    07-08    141  |  101  |  10  ----------------------------<  122<H>  4.7   |  28  |  0.8  07-06    139  |  102  |  19  ----------------------------<  126<H>  3.9   |  27  |  0.9    Ca    9.3      08 Jul 2022 07:50  Mg     2.1     07-08    TPro  6.5 [6.0 - 8.0]  /  Alb  4.1 [3.5 - 5.2]  /  TBili  0.6 [0.2 - 1.2]  /  DBili  x   /  AST  22 [0 - 41]  /  ALT  30 [0 - 41]  /  AlkPhos  244<H> [30 - 115]  07-08    MEDICATIONS  (STANDING):  furosemide   Injectable 40 milliGRAM(s) IV Push Once    MEDICATIONS  (PRN):  morphine  - Injectable 2 milliGRAM(s) IV Push every 5 minutes PRN Chest Pain      Allergies:  penicillins (Hives)      Ambulation/Activity Status: Ambulates several times daily with assistance.    Assessment/Plan:  61y Male status-post CABG  - Case and plan discussed with CTU Intensivist and CT Surgeon - Dr. Reina/Bruce/Karen  - Continue supportive care    - Continue DVT/GI prophylaxis  - Incentive Spirometry 10 times an hour  - Continue to advance physical activity as tolerated   1. Patient seen in ED today for SOB and difficulty taking deep breath.  The patient was found to have moderate to large left pleural effusion; small to moderate right pleural effusion; and trace pericardial effusion as seen on bedside sono.  2. Left thoracentesis was performed at bedside and 750cc of serous fluid was obtained without complication.  Post-procedure CXR was negative for pneumothorax   3. IV lasix while in ED and continue lasix PO 40mg Q24h on discharge.  4. ED Obs overnight with repeat CXR in AM.  If patient off oxygen and AM CXR unchanged will plan to d/c home in AM tomorrow

## 2022-07-09 ENCOUNTER — TRANSCRIPTION ENCOUNTER (OUTPATIENT)
Age: 62
End: 2022-07-09

## 2022-07-09 VITALS
HEART RATE: 77 BPM | DIASTOLIC BLOOD PRESSURE: 83 MMHG | OXYGEN SATURATION: 99 % | SYSTOLIC BLOOD PRESSURE: 148 MMHG | RESPIRATION RATE: 20 BRPM

## 2022-07-09 PROCEDURE — 71045 X-RAY EXAM CHEST 1 VIEW: CPT | Mod: 26

## 2022-07-09 PROCEDURE — 99217: CPT

## 2022-07-09 RX ORDER — HYDROMORPHONE HYDROCHLORIDE 2 MG/ML
1 INJECTION INTRAMUSCULAR; INTRAVENOUS; SUBCUTANEOUS ONCE
Refills: 0 | Status: DISCONTINUED | OUTPATIENT
Start: 2022-07-09 | End: 2022-07-09

## 2022-07-09 RX ORDER — HYDROMORPHONE HYDROCHLORIDE 2 MG/ML
2 INJECTION INTRAMUSCULAR; INTRAVENOUS; SUBCUTANEOUS ONCE
Refills: 0 | Status: DISCONTINUED | OUTPATIENT
Start: 2022-07-09 | End: 2022-07-09

## 2022-07-09 RX ORDER — HYDROMORPHONE HYDROCHLORIDE 2 MG/ML
1 INJECTION INTRAMUSCULAR; INTRAVENOUS; SUBCUTANEOUS
Qty: 9 | Refills: 0
Start: 2022-07-09 | End: 2022-07-11

## 2022-07-09 RX ADMIN — Medication 100 MILLIGRAM(S): at 05:18

## 2022-07-09 RX ADMIN — LISINOPRIL 30 MILLIGRAM(S): 2.5 TABLET ORAL at 05:19

## 2022-07-09 RX ADMIN — CLOPIDOGREL BISULFATE 75 MILLIGRAM(S): 75 TABLET, FILM COATED ORAL at 11:32

## 2022-07-09 RX ADMIN — HYDROMORPHONE HYDROCHLORIDE 2 MILLIGRAM(S): 2 INJECTION INTRAMUSCULAR; INTRAVENOUS; SUBCUTANEOUS at 12:33

## 2022-07-09 RX ADMIN — HYDROMORPHONE HYDROCHLORIDE 1 MILLIGRAM(S): 2 INJECTION INTRAMUSCULAR; INTRAVENOUS; SUBCUTANEOUS at 04:09

## 2022-07-09 RX ADMIN — Medication 0.6 MILLIGRAM(S): at 05:19

## 2022-07-09 RX ADMIN — Medication 325 MILLIGRAM(S): at 11:32

## 2022-07-09 RX ADMIN — HYDROMORPHONE HYDROCHLORIDE 2 MILLIGRAM(S): 2 INJECTION INTRAMUSCULAR; INTRAVENOUS; SUBCUTANEOUS at 11:51

## 2022-07-09 RX ADMIN — ABACAVIR 600 MILLIGRAM(S): 20 SOLUTION ORAL at 11:33

## 2022-07-09 RX ADMIN — Medication 300 MILLIGRAM(S): at 11:32

## 2022-07-09 RX ADMIN — Medication 25 MILLIGRAM(S): at 05:19

## 2022-07-09 RX ADMIN — DOLUTEGRAVIR SODIUM 50 MILLIGRAM(S): 25 TABLET, FILM COATED ORAL at 11:32

## 2022-07-09 NOTE — ED CDU PROVIDER SUBSEQUENT DAY NOTE - PROGRESS NOTE DETAILS
patient signed out from rivera alcantar, no complaint awaiting ct surgery will continue to monitor patient comfortable, seen and reevaluated by ct surgery will d/c home with follow up instructions

## 2022-07-09 NOTE — ED CDU PROVIDER DISPOSITION NOTE - CARE PROVIDER_API CALL
Bladimir Barrera (MD)  Surgery; Thoracic and Cardiac Surgery  [address] [place] [address], [address]  [place] [address]  Phone: [phone]  Fax: [phone]  Established Patient  Follow Up Time: 4-6 Days    Bladimir Henderson ()  Infectious Disease; Internal Medicine  [address] [place] [address]  Phone: [phone]  Fax: [phone]  Established Patient  Follow Up Time: 4-6 Days

## 2022-07-09 NOTE — ED CDU PROVIDER DISPOSITION NOTE - CARE PROVIDERS DIRECT ADDRESSES
,[email or web address]

## 2022-07-09 NOTE — ED CDU PROVIDER SUBSEQUENT DAY NOTE - ATTENDING APP SHARED VISIT CONTRIBUTION OF CARE
61-year-old man, history of hypertension, HIV with undetectable viral load, CAD status post CABG 7/01 by Dr. Barrera was placed in CDU for observation after an ED thoracentesis 2/2 pleural effusion. Patient had complained of feeling increased chest tightness and shortness of breath after discharge, despite Lasix at home. After the thoracentesis he denies johan chest pain, however he does complain of back pain at the site of the thoracentesis. CXR post procedure was negative for PTX and showed significant improvement in effusion. Patient was monitored overnight, and continued to have pain despite multiple doses of morphine.  He was seen by cardiothoracic surgery this morning, chest x-ray still no pneumothorax.  I turned off the oxygen and walked the patient, which caused an increase in pain, and saturation decreased to 89-90 on room air, likely due to significant and persistent splinting.  Per wife, patient has a remote history of opiate use disorder, and has a high requirement for pain meds as was seen during his hospitalization.  Spoke with CT PA, will try dose of Dilaudid p.o. and reassess, as if patient cannot keep pulse ox reliably in the 90s, he may require admission.  Patient and wife are comfortable with this plan.  We will continue to observe.

## 2022-07-09 NOTE — ED CDU PROVIDER SUBSEQUENT DAY NOTE - HISTORY
pt resting in obs; c/o pain at site of thoracentesis; pain meds given; repeat cxr performed; pt pending reevaluation by ct surgery;

## 2022-07-09 NOTE — ED CDU PROVIDER DISPOSITION NOTE - PROVIDER TOKENS
PROVIDER:[TOKEN:[[id number]],FOLLOWUP:[4-6 Days],ESTABLISHEDPATIENT:[T]],PROVIDER:[TOKEN:[[id number]],FOLLOWUP:[4-6 Days],ESTABLISHEDPATIENT:[T]]

## 2022-07-09 NOTE — ED CDU PROVIDER DISPOSITION NOTE - PATIENT PORTAL LINK FT
You can access the FollowMyHealth Patient Portal offered by [facility] by registering at the following website: [email or web address]. By joining [employer]’s FollowMyHealth portal, you will also be able to view your health information using other applications (apps) compatible with our system.

## 2022-07-09 NOTE — ED CDU PROVIDER SUBSEQUENT DAY NOTE - MEDICAL DECISION MAKING DETAILS
61-year-old man, history of hypertension, HIV with undetectable viral load, CAD status post CABG 7/01 by Dr. Barrera was placed in CDU for observation after an ED thoracentesis 2/2 pleural effusion. Patient had complained of feeling increased chest tightness and shortness of breath after discharge, despite Lasix at home. After the thoracentesis he denies johan chest pain, however he does complain of back pain at the site of the thoracentesis. CXR post procedure was negative for PTX and showed significant improvement in effusion. Patient was monitored overnight, and continued to have pain despite multiple doses of morphine.  He was seen by cardiothoracic surgery this morning, chest x-ray still no pneumothorax.  I turned off the oxygen and walked the patient, which caused an increase in pain, and saturation decreased to 89-90 on room air, likely due to significant and persistent splinting.  Per wife, patient has a remote history of opiate use disorder, and has a high requirement for pain meds as was seen during his hospitalization.  After pain regimen was changed to p.o. Dilaudid, patient experienced significant improvement, used his incentive spirometer more effectively, and was able to ambulate with pulse ox above 90.  Both he and wife would prefer to go home and are comfortable with managing the pain with Dilaudid.  Patient has follow-up with CT surgery already scheduled, he will return for persistent or worsening symptoms.

## 2022-07-10 ENCOUNTER — TRANSCRIPTION ENCOUNTER (OUTPATIENT)
Age: 62
End: 2022-07-10

## 2022-07-11 ENCOUNTER — APPOINTMENT (OUTPATIENT)
Dept: CARE COORDINATION | Facility: HOME HEALTH | Age: 62
End: 2022-07-11

## 2022-07-11 ENCOUNTER — TRANSCRIPTION ENCOUNTER (OUTPATIENT)
Age: 62
End: 2022-07-11

## 2022-07-11 PROCEDURE — 99024 POSTOP FOLLOW-UP VISIT: CPT

## 2022-07-11 RX ORDER — CELECOXIB 200 MG/1
200 CAPSULE ORAL
Qty: 60 | Refills: 0 | Status: COMPLETED | COMMUNITY
Start: 2021-09-13 | End: 2022-07-11

## 2022-07-11 RX ORDER — HYOSCYAMINE SULFATE 0.12 MG/1
0.12 TABLET ORAL
Qty: 90 | Refills: 0 | Status: COMPLETED | COMMUNITY
Start: 2022-04-20 | End: 2022-07-11

## 2022-07-11 RX ORDER — HYOSCYAMINE SULFATE 0.38 MG/1
0.38 TABLET, EXTENDED RELEASE ORAL
Qty: 60 | Refills: 0 | Status: ACTIVE | COMMUNITY
Start: 2022-06-13

## 2022-07-11 RX ORDER — METOPROLOL TARTRATE 25 MG/1
25 TABLET, FILM COATED ORAL
Qty: 60 | Refills: 0 | Status: COMPLETED | COMMUNITY
Start: 2022-07-06 | End: 2022-07-11

## 2022-07-11 RX ORDER — METOPROLOL TARTRATE 25 MG/1
25 TABLET, FILM COATED ORAL TWICE DAILY
Qty: 90 | Refills: 0 | Status: ACTIVE | COMMUNITY
Start: 2022-07-11

## 2022-07-11 RX ORDER — BENZONATATE 100 MG/1
100 CAPSULE ORAL
Qty: 90 | Refills: 0 | Status: ACTIVE | COMMUNITY
Start: 2022-07-06

## 2022-07-11 RX ORDER — IBUPROFEN 800 MG/1
800 TABLET, FILM COATED ORAL
Refills: 0 | Status: COMPLETED | COMMUNITY
End: 2022-07-11

## 2022-07-11 RX ORDER — DAPSONE 100 MG/1
100 TABLET ORAL
Refills: 0 | Status: COMPLETED | COMMUNITY
End: 2022-07-11

## 2022-07-11 RX ORDER — AMLODIPINE BESYLATE 5 MG/1
5 TABLET ORAL
Refills: 0 | Status: DISCONTINUED | COMMUNITY
End: 2022-07-11

## 2022-07-11 RX ORDER — LISINOPRIL 30 MG/1
30 TABLET ORAL
Qty: 90 | Refills: 0 | Status: ACTIVE | COMMUNITY
Start: 2021-10-25

## 2022-07-11 RX ORDER — ASPIRIN 325 MG/1
325 TABLET ORAL
Qty: 30 | Refills: 0 | Status: ACTIVE | COMMUNITY
Start: 2022-07-06

## 2022-07-11 RX ORDER — SULFASALAZINE 500 MG/1
500 TABLET ORAL
Qty: 360 | Refills: 0 | Status: DISCONTINUED | COMMUNITY
Start: 2022-04-10

## 2022-07-11 RX ORDER — CLOPIDOGREL BISULFATE 75 MG/1
75 TABLET, FILM COATED ORAL
Qty: 30 | Refills: 0 | Status: ACTIVE | COMMUNITY
Start: 2022-07-06

## 2022-07-11 RX ORDER — METOPROLOL TARTRATE 100 MG/1
100 TABLET, FILM COATED ORAL
Qty: 2 | Refills: 0 | Status: COMPLETED | COMMUNITY
Start: 2022-06-21 | End: 2022-07-11

## 2022-07-11 NOTE — REVIEW OF SYSTEMS
[Feeling Tired] : feeling tired [Eyesight Problems] : eyesight problems [SOB on Exertion] : shortness of breath during exertion [Sleep Disturbances] : sleep disturbances [Lower Ext Edema] : no extremity edema [Abdominal Pain] : no abdominal pain [Vomiting] : no vomiting [Nocturia] : no nocturia [Dizziness] : no dizziness [Fainting] : no fainting [Easy Bleeding] : no tendency for easy bleeding

## 2022-07-11 NOTE — HISTORY OF PRESENT ILLNESS
[FreeTextEntry1] : 61y Male with PMH of HIV, former drug abuser, ETOH abuse, RA, presented to his cardiologist for 2 years of chest pain on exertion, stress test negative, and normal EKG, had subsequent CTA which showed LM and 3vCAD. Cardiac cath showed LM 90%, 3vCAD. CT surgery consulted for CABG evaluation. \par \par On 7/01, the patient underwent a cabg x 4 and a postop course significant for mildly elevated LFT's and alkaline phosphate. He had a KUB that was negative for obstruction and an abdominal US that was negative for any cholelithiasis / cholecystitis.  Amiodarone and statin therapy were stopped. He was then discharged home instable condition on POD # 5 with instructions to have a repeat CMP prior to the follow up visit. He was evaluated in ER after hospital discharge for SOB found to have pleural effusion and underwent thoracentesis. He was discharged to home and agrees to telehealth visit today.

## 2022-07-11 NOTE — PHYSICAL EXAM
[] : no respiratory distress [Oriented To Time, Place, And Person] : oriented to person, place, and time [FreeTextEntry1] : steri strips intact over sternotomy incision, RIGHT svg site- dressings in tact

## 2022-07-11 NOTE — ASSESSMENT
[FreeTextEntry1] : 61y Male with PMH of HIV, former drug abuser, ETOH abuse, RA, presented to his cardiologist for 2 years of chest pain on exertion, stress test negative, and normal EKG, had subsequent CTA which showed LM and 3vCAD. Cardiac cath showed LM 90%, 3vCAD. CT surgery consulted for CABG evaluation. \par \par On 7/01, the patient underwent a cabg x 4 and a postop course significant for mildly elevated LFT's and alkaline phosphate. He had a KUB that was negative for obstruction and an abdominal US that was negative for any cholelithiasis / cholecystitis.  Amiodarone and statin therapy were stopped. He was then discharged home instable condition on POD # 5 with instructions to have a repeat CMP prior to the follow up visit. He was evaluated in ER after hospital discharge for SOB found to have pleural effusion and underwent thoracentesis. He was discharged to home and agrees to telehealth visit today. \par \par Today on virtual exam no use of accessory muscles noted or respiratory distress,  sternal incision clean, dry and intact. Steri strips intact. \par \par RIGHT SVG sites are  clean, dry and intact.  No peripheral edema noted. \par \par Instructed patient on importance of optimal glycemic control, daily showering, daily weights and no heavy lifting. Instructed to call office with any signs of fever (temperature greater than 101F, chills, increasing shortness of breath, palpitations or lightheadedness or syncope. Also reinforced with patient to call office if any weight gain of 2 or more pounds in 1 day or 3 or more pounds in 1 week. \par \par Educated patient on FY and care navigator's role. All questions were answered and concerns addressed. Awaiting visiting RN today. \par \par Plan:\par 1) Continue current medication regimen\par 2) Follow up with CT surgeon (Wednesday 7/13/2022)\par 3) Increase activity as tolerated\par 4) Seek medical attention if you experience chest pain, palpitations, shortness of breath, persistent nausea and vomiting\par oozing from wounds or pain not relieved with medication\par 5) Notify surgeon for any temperature greater than 101 F\par 6) Keep legs elevated when sitting\par 7) Call to schedule follow up visit with cardiologist \par \par \par \par \par

## 2022-07-13 ENCOUNTER — OUTPATIENT (OUTPATIENT)
Dept: OUTPATIENT SERVICES | Facility: HOSPITAL | Age: 62
LOS: 1 days | Discharge: HOME | End: 2022-07-13

## 2022-07-13 ENCOUNTER — APPOINTMENT (OUTPATIENT)
Dept: CARDIOTHORACIC SURGERY | Facility: CLINIC | Age: 62
End: 2022-07-13

## 2022-07-13 VITALS
WEIGHT: 187 LBS | TEMPERATURE: 98.7 F | BODY MASS INDEX: 26.77 KG/M2 | RESPIRATION RATE: 16 BRPM | OXYGEN SATURATION: 93 % | HEIGHT: 70 IN | HEART RATE: 94 BPM

## 2022-07-13 VITALS — SYSTOLIC BLOOD PRESSURE: 133 MMHG | DIASTOLIC BLOOD PRESSURE: 84 MMHG

## 2022-07-13 DIAGNOSIS — Z95.1 PRESENCE OF AORTOCORONARY BYPASS GRAFT: ICD-10-CM

## 2022-07-13 DIAGNOSIS — I25.118 ATHEROSCLEROTIC HEART DISEASE OF NATIVE CORONARY ARTERY WITH OTHER FORMS OF ANGINA PECTORIS: ICD-10-CM

## 2022-07-13 DIAGNOSIS — Z88.0 ALLERGY STATUS TO PENICILLIN: ICD-10-CM

## 2022-07-13 DIAGNOSIS — Z82.49 FAMILY HISTORY OF ISCHEMIC HEART DISEASE AND OTHER DISEASES OF THE CIRCULATORY SYSTEM: ICD-10-CM

## 2022-07-13 DIAGNOSIS — E87.70 FLUID OVERLOAD, UNSPECIFIED: ICD-10-CM

## 2022-07-13 DIAGNOSIS — Z98.890 OTHER SPECIFIED POSTPROCEDURAL STATES: Chronic | ICD-10-CM

## 2022-07-13 DIAGNOSIS — B18.2 CHRONIC VIRAL HEPATITIS C: ICD-10-CM

## 2022-07-13 DIAGNOSIS — F17.200 NICOTINE DEPENDENCE, UNSPECIFIED, UNCOMPLICATED: ICD-10-CM

## 2022-07-13 DIAGNOSIS — Z97.2 PRESENCE OF DENTAL PROSTHETIC DEVICE (COMPLETE) (PARTIAL): ICD-10-CM

## 2022-07-13 DIAGNOSIS — I11.0 HYPERTENSIVE HEART DISEASE WITH HEART FAILURE: ICD-10-CM

## 2022-07-13 DIAGNOSIS — Z21 ASYMPTOMATIC HUMAN IMMUNODEFICIENCY VIRUS [HIV] INFECTION STATUS: ICD-10-CM

## 2022-07-13 DIAGNOSIS — R00.0 TACHYCARDIA, UNSPECIFIED: ICD-10-CM

## 2022-07-13 DIAGNOSIS — D69.6 THROMBOCYTOPENIA, UNSPECIFIED: ICD-10-CM

## 2022-07-13 DIAGNOSIS — D62 ACUTE POSTHEMORRHAGIC ANEMIA: ICD-10-CM

## 2022-07-13 DIAGNOSIS — R79.89 OTHER SPECIFIED ABNORMAL FINDINGS OF BLOOD CHEMISTRY: ICD-10-CM

## 2022-07-13 DIAGNOSIS — K59.00 CONSTIPATION, UNSPECIFIED: ICD-10-CM

## 2022-07-13 DIAGNOSIS — M06.9 RHEUMATOID ARTHRITIS, UNSPECIFIED: ICD-10-CM

## 2022-07-13 DIAGNOSIS — I50.23 ACUTE ON CHRONIC SYSTOLIC (CONGESTIVE) HEART FAILURE: ICD-10-CM

## 2022-07-13 DIAGNOSIS — K21.9 GASTRO-ESOPHAGEAL REFLUX DISEASE WITHOUT ESOPHAGITIS: ICD-10-CM

## 2022-07-13 DIAGNOSIS — D63.8 ANEMIA IN OTHER CHRONIC DISEASES CLASSIFIED ELSEWHERE: ICD-10-CM

## 2022-07-13 PROCEDURE — 71046 X-RAY EXAM CHEST 2 VIEWS: CPT | Mod: 26

## 2022-07-13 PROCEDURE — 99024 POSTOP FOLLOW-UP VISIT: CPT

## 2022-07-13 RX ORDER — FAMOTIDINE 40 MG/1
40 TABLET, FILM COATED ORAL
Qty: 60 | Refills: 0 | Status: COMPLETED | COMMUNITY
Start: 2022-02-17 | End: 2022-07-13

## 2022-07-15 ENCOUNTER — TRANSCRIPTION ENCOUNTER (OUTPATIENT)
Age: 62
End: 2022-07-15

## 2022-07-19 LAB
ALBUMIN SERPL ELPH-MCNC: 4.8 G/DL
ALP BLD-CCNC: 206 U/L
ALT SERPL-CCNC: 19 U/L
ANION GAP SERPL CALC-SCNC: 10 MMOL/L
AST SERPL-CCNC: 15 U/L
BILIRUB SERPL-MCNC: 0.3 MG/DL
BUN SERPL-MCNC: 19 MG/DL
CALCIUM SERPL-MCNC: 10.1 MG/DL
CHLORIDE SERPL-SCNC: 99 MMOL/L
CO2 SERPL-SCNC: 29 MMOL/L
CREAT SERPL-MCNC: 0.9 MG/DL
EGFR: 98 ML/MIN/1.73M2
GLUCOSE SERPL-MCNC: 124 MG/DL
POTASSIUM SERPL-SCNC: 4.9 MMOL/L
PROT SERPL-MCNC: 7.4 G/DL
SODIUM SERPL-SCNC: 138 MMOL/L

## 2022-07-20 ENCOUNTER — APPOINTMENT (OUTPATIENT)
Dept: CARDIOTHORACIC SURGERY | Facility: CLINIC | Age: 62
End: 2022-07-20

## 2022-07-20 ENCOUNTER — OUTPATIENT (OUTPATIENT)
Dept: OUTPATIENT SERVICES | Facility: HOSPITAL | Age: 62
LOS: 1 days | Discharge: HOME | End: 2022-07-20

## 2022-07-20 VITALS
TEMPERATURE: 97.8 F | HEART RATE: 70 BPM | BODY MASS INDEX: 26.48 KG/M2 | HEIGHT: 70 IN | WEIGHT: 185 LBS | DIASTOLIC BLOOD PRESSURE: 78 MMHG | SYSTOLIC BLOOD PRESSURE: 136 MMHG | RESPIRATION RATE: 14 BRPM | OXYGEN SATURATION: 94 %

## 2022-07-20 DIAGNOSIS — Z98.890 OTHER SPECIFIED POSTPROCEDURAL STATES: Chronic | ICD-10-CM

## 2022-07-20 DIAGNOSIS — Z95.1 PRESENCE OF AORTOCORONARY BYPASS GRAFT: ICD-10-CM

## 2022-07-20 PROCEDURE — 99024 POSTOP FOLLOW-UP VISIT: CPT

## 2022-07-20 PROCEDURE — 71046 X-RAY EXAM CHEST 2 VIEWS: CPT | Mod: 26

## 2022-07-20 RX ORDER — POTASSIUM CHLORIDE 1500 MG/1
20 TABLET, FILM COATED, EXTENDED RELEASE ORAL DAILY
Qty: 7 | Refills: 0 | Status: DISCONTINUED | COMMUNITY
Start: 2022-07-07 | End: 2022-07-20

## 2022-07-20 RX ORDER — HYDROMORPHONE HYDROCHLORIDE 2 MG/1
2 TABLET ORAL
Qty: 9 | Refills: 0 | Status: DISCONTINUED | COMMUNITY
Start: 2022-07-09 | End: 2022-07-20

## 2022-07-20 RX ORDER — COLCHICINE 0.6 MG/1
0.6 TABLET ORAL
Qty: 8 | Refills: 0 | Status: DISCONTINUED | COMMUNITY
Start: 2022-07-06 | End: 2022-07-20

## 2022-07-20 RX ORDER — POLYETHYLENE GLYCOL 3350 17 G/17G
17 POWDER, FOR SOLUTION ORAL
Qty: 510 | Refills: 0 | Status: DISCONTINUED | COMMUNITY
Start: 2022-07-06 | End: 2022-07-20

## 2022-07-20 RX ORDER — FUROSEMIDE 40 MG/1
40 TABLET ORAL DAILY
Qty: 7 | Refills: 0 | Status: DISCONTINUED | COMMUNITY
Start: 2022-07-07 | End: 2022-07-20

## 2022-07-25 ENCOUNTER — NON-APPOINTMENT (OUTPATIENT)
Age: 62
End: 2022-07-25

## 2022-07-25 RX ORDER — OXYCODONE AND ACETAMINOPHEN 5; 325 MG/1; MG/1
5-325 TABLET ORAL
Qty: 10 | Refills: 0 | Status: ACTIVE | COMMUNITY
Start: 2022-07-09 | End: 1900-01-01

## 2022-08-03 ENCOUNTER — APPOINTMENT (OUTPATIENT)
Dept: CARDIOTHORACIC SURGERY | Facility: CLINIC | Age: 62
End: 2022-08-03

## 2022-08-03 VITALS
RESPIRATION RATE: 14 BRPM | HEART RATE: 72 BPM | SYSTOLIC BLOOD PRESSURE: 132 MMHG | WEIGHT: 185 LBS | BODY MASS INDEX: 26.48 KG/M2 | DIASTOLIC BLOOD PRESSURE: 75 MMHG | OXYGEN SATURATION: 95 % | HEIGHT: 70 IN | TEMPERATURE: 97.8 F

## 2022-08-03 PROCEDURE — 99024 POSTOP FOLLOW-UP VISIT: CPT

## 2022-08-04 ENCOUNTER — TRANSCRIPTION ENCOUNTER (OUTPATIENT)
Age: 62
End: 2022-08-04

## 2022-11-14 NOTE — ED CDU PROVIDER INITIAL DAY NOTE - ASSESSMENT PLAN
Post Acute Facility/Agency List     Provided  patient and mother  with the following list, the list includes the overall star ratings obtained from CMS per the Medicare Web site (www.Medicare.gov):     [] [address]  [] Acute Inpatient Rehabilitation Facilities  [x] Skilled Nursing Facilities  [] Home Care    Provided verbal instructions on how to utilize the QR Code to obtain additional detailed star ratings from www. Medicare. gov     offered to print and provide the detailed list:    []Accepted   [x]Declined    The following printed detailed lists were provided: Telemetry

## 2022-12-16 NOTE — ED ADULT NURSE NOTE - NSFALLRSKINDICATORS_ED_ALL_ED
Occupational Therapy    Patient not seen in therapy.     Unavailable due to request no therapy today.      Re-attempt plan: later today    OT orders received and acknowledged.  Wife in room to interpret. Patient declining activity until after breakfast. Will continue OT efforts later today/tomorrow.      OBJECTIVE                          Documented in the chart in the following areas: Assessment. Plan.      Therapy procedure time and total treatment time can be found documented on the Time Entry flowsheet   no

## 2023-01-13 NOTE — ED CDU PROVIDER SUBSEQUENT DAY NOTE - WET READ LAUNCH FT
There is 1 Wet Read(s) to document. There are no Wet Read(s) to document. Pounds Preamble Statement (Weight Entered In Details Tab): Reported Weight in pounds:

## 2023-02-28 NOTE — PROCEDURAL SAFETY CHECKLIST WITH OR WITHOUT SEDATION - NSPROCEDPERFORMDFREE_GEN_ALL_CORE
Health Maintenance Due   Topic Date Due    Pneumococcal Vaccines (Age 0-64) (2 - PCV) 07/02/2021    COVID-19 Vaccine (4 - Booster for Pfizer series) 04/29/2022    Influenza Vaccine (1) 09/01/2022    Diabetes Urine Screening  12/06/2022     Chart reviewed.   Immunizations: Reconciled  Orders placed: N/A  Upcoming appts to satisfy JELANI topics: Podiatry 3/21/2023, Optometry 5/19/2023        
left thoracentesis

## 2023-04-11 NOTE — H&P PST ADULT - FUNCTIONAL SCREEN CURRENT LEVEL: BATHING, MLM
Jose Cruz called back and stated he had spoken with the NP who saw patient yesterday. It was stated that they felt patient does not need to keep the appointment to see wound care this Friday 4/14/23, but it is the patient's decision whether or not to keep or cancel.     Writer did call and left a message for patient to call us back to see if she would like to keep her appointment for this Friday or to cancel.     Waiting for patient to call us back.    (0) independent

## 2023-11-15 ENCOUNTER — APPOINTMENT (OUTPATIENT)
Dept: ORTHOPEDIC SURGERY | Facility: CLINIC | Age: 63
End: 2023-11-15
Payer: MEDICAID

## 2023-11-15 VITALS — WEIGHT: 187 LBS | HEIGHT: 70 IN | BODY MASS INDEX: 26.77 KG/M2

## 2023-11-15 PROCEDURE — 99214 OFFICE O/P EST MOD 30 MIN: CPT

## 2023-11-15 RX ORDER — METHYLPREDNISOLONE 4 MG/1
4 TABLET ORAL
Qty: 21 | Refills: 0 | Status: ACTIVE | COMMUNITY
Start: 2023-11-15 | End: 1900-01-01

## 2023-11-27 DIAGNOSIS — M25.531 PAIN IN RIGHT WRIST: ICD-10-CM

## 2023-12-19 ENCOUNTER — APPOINTMENT (OUTPATIENT)
Dept: ORTHOPEDIC SURGERY | Facility: CLINIC | Age: 63
End: 2023-12-19

## 2024-01-16 NOTE — PROGRESS NOTE ADULT - SUBJECTIVE AND OBJECTIVE BOX
OPERATIVE PROCEDURE(s):   CABG            1 week ago                  61yMale  SURGEON(s): Dr. Barrera  SUBJECTIVE ASSESSMENT:  Patient has no complaints at this time.    Vital Signs Last 24 Hrs  T(F): 97.5 (2022 04:02), Max: 97.5 (2022 04:02)  HR: 77 (2022 08:00) (77 - 90)  BP: 148/83 (2022 08:00) (132/75 - 148/83)  BP(mean): 107 (2022 08:00) (107 - 107)  RR: 20 (2022 08:00) (20 - 20)  SpO2: 99% (2022 08:00) (94% - 99%) on 2LNC - 92-94% on room air    Physical Exam:  General: NAD; A&Ox3  Cardiac: S1/S2, RRR, no murmur, no rubs  Lungs: unlabored respirations, CTA b/l, no wheeze, no rales, no crackles  Abdomen: Soft/NT/ND; positive bowel sounds x 4  Sternum: Intact, no click, incision healing well with no drainage  Incisions: Incisions clean/dry/intact  Extremities: No edema b/l lower extremities; good capillary refill; no cyanosis; palpable 1+ pedal pulses b/l        LABS:                        9.6<L>  5.81  )-----------( 221      ( 2022 07:50 )             29.2<L>    07-08    141  |  101  |  10  ----------------------------<  122<H>  4.7   |  28  |  0.8    Ca    9.3      2022 07:50  Mg     2.1     07-08    TPro  6.5 [6.0 - 8.0]  /  Alb  4.1 [3.5 - 5.2]  /  TBili  0.6 [0.2 - 1.2]  /  DBili  x   /  AST  22 [0 - 41]  /  ALT  30 [0 - 41]  /  AlkPhos  244<H> [30 - 115]  07-08          RADIOLOGY & ADDITIONAL TESTS:  CXR: Xray Chest 1 View- PORTABLE-Routine:   ACC: [id number] EXAM:  XR CHEST PORTABLE ROUTINE 1V                          PROCEDURE DATE:  2022          INTERPRETATION:  Clinical History / Reason for exam: Postop.    Comparison : Chest radiograph 2022 time 4:08 PM.    Technique/Positioning: Frontal chest.    Findings:    Support devices: Tube overlies the left thorax..    Cardiac/mediastinum/hilum: Cardiomegaly, status post median sternotomy..    Lung parenchyma/Pleura: Left basilar opacity/pleural effusion..    Skeleton/soft tissues: Stable.    Impression:    Stable left basilar opacity/pleural effusion and cardiomegaly.        --- End of Report ---            CINDY ROSADO MD; Attending Radiologist  This document has been electronically signed. 2022  6:09AM (22 @ 04:19)    EK Lead ECG:   Ventricular Rate 90 BPM    Atrial Rate 90 BPM    P-R Interval 144 ms    QRS Duration 88 ms    Q-T Interval 400 ms    QTC Calculation(Bazett) 489 ms    P Axis 38 degrees    R Axis 11 degrees    T Axis 23 degrees    Diagnosis Line Normal sinus rhythm  Prolonged QT  Abnormal ECG    Confirmed by Quentin Mcginnis ([id number]) on 2022 5:03:22 PM (22 @ 14:00)    MEDICATIONS  (STANDING):  abacavir 600 milliGRAM(s) Oral daily  aspirin 325 milliGRAM(s) Oral daily  benzonatate 100 milliGRAM(s) Oral every 8 hours  clopidogrel Tablet 75 milliGRAM(s) Oral daily  colchicine 0.6 milliGRAM(s) Oral every 12 hours  dolutegravir 50 milliGRAM(s) Oral daily  lamiVUDine 300 milliGRAM(s) Oral daily  lisinopril 30 milliGRAM(s) Oral daily  metoprolol tartrate 25 milliGRAM(s) Oral every 12 hours      Allergies:  penicillins (Hives)      Ambulation/Activity Status: Ambulates several times daily with assistance.    Assessment/Plan:  61y Male status-post CABG  - Case and plan discussed with CTU Intensivist and CT Surgeon - Dr. Reina/Bruce/Karen  - Continue DVT/GI prophylaxis  - Incentive Spirometry 10 times an hour  - Continue to advance physical activity as tolerated and continue PT/OT as directed  1. Pleural effsuion s/p thoracentesis - CXR this morning is stable  2. Patient requires pain management and deep breathing exercises for atelectasis  3. OK to d/c home today.  Patient will follow-up with Dr. Barrera on 2022                           No

## 2024-05-02 NOTE — ED CDU PROVIDER INITIAL DAY NOTE - NS ED ROS FT
"Patient has Malnutrition Diagnosis: Yes  Diagnosis Status: New  Malnutrition Diagnosis: Moderate malnutrition related to chronic disease or condition  As Evidenced by: significant 21% weight loss/13 months, loss of muscle and adipose stores, consuming less than 75% of estimated energy requirements >/= 1 month  Additional Assessment Information: ONS added for additional kcal and protein  Nutrition Assessment    Reason for Assessment: Admission nursing screening (MST=3; weight loss, eating poorly)    Patient is a 69 y.o. male presenting with: COPD exacerbation (Multi),   Past Medical History:   Diagnosis Date    Hypertension       Past Surgical History:   Procedure Laterality Date    CT AORTA AND BILATERAL ILIOFEMORAL RUNOFF ANGIOGRAM W AND/OR WO IV CONTRAST  11/26/2018    CT AORTA AND BILATERAL ILIOFEMORAL RUNOFF ANGIOGRAM W AND/OR WO IV CONTRAST 11/26/2018 [facility]      Nutrition History:  Food and Nutrient History: Pt reports poor appetite at home before admission, but improved since admission to hospital. Pt reports eating a regular diet, interested to try ONS to help with weight gain. Discussed need for additional kcal with dx of COPD and increased energy expenditure with increased work of breathing. Pt ate 100% of  breakfast today.  Energy Intake: Poor < 50 %  No Known Allergies   GI Symptoms: None     Oral Problems: None          Anthropometrics:  Height: 177.8 cm (5' 10\")   Weight: 67.1 kg (147 lb 14.9 oz)   BMI (Calculated): 21.23  IBW/kg (Dietitian Calculated): 75.5 kg  Percent of IBW: 89 %       Weight History:   Wt Readings from Last 10 Encounters:   05/02/24 67.1 kg (147 lb 14.9 oz)   04/16/24 68.3 kg (150 lb 9.6 oz)   03/14/24 70.7 kg (155 lb 12.8 oz)   01/23/24 72 kg (158 lb 11.7 oz)   05/27/22 69.9 kg (154 lb)   05/20/22 68 kg (150 lb)       Weight Change %:  Weight History / % Weight Change: 72 kg (1/23/24) down 6.8%/4 months; 85 kg (4/5/23) down 21%/13 months  Significant Weight Loss: " "Yes  Interpretation of Weight Loss: 20% in 1 year       Nutrition Focused Physical Exam Findings:    Subcutaneous Fat Loss:   Orbital Fat Pads: Mild-Moderate (slight dark circles and slight hollowing)  Buccal Fat Pads: Well nourished (full, rounded cheeks)  Muscle Wasting:  Temporalis: Mild-Moderate (slight depression)  Pectoralis (Clavicular Region): Mild-Moderate (some protrusion of clavicle)  Edema:  Edema: none  Physical Findings:  Skin: Negative (intact)    Nutrition Significant Labs:  CBC Trend:   Results from last 7 days   Lab Units 05/02/24  0740 05/01/24  1030   WBC AUTO x10*3/uL 9.4 7.9   RBC AUTO x10*6/uL 4.61 4.74   HEMOGLOBIN g/dL 14.4 14.9   HEMATOCRIT % 43.5 45.1   MCV fL 94 95   PLATELETS AUTO x10*3/uL 278 306    , A1C:No results found for: \"HGBA1C\", BG POCT trend:    , Liver Function Trend:   Results from last 7 days   Lab Units 05/01/24  1030   ALK PHOS U/L 82   AST U/L 25   ALT U/L 9*   BILIRUBIN TOTAL mg/dL 0.3    , Renal Lab Trend:   Results from last 7 days   Lab Units 05/02/24  0740 05/01/24  1030   POTASSIUM mmol/L 4.3 3.9   PHOSPHORUS mg/dL 3.0  --    SODIUM mmol/L 136 138   MAGNESIUM mg/dL 1.74 1.17*   EGFR mL/min/1.73m*2 >90 >90   BUN mg/dL 12 11   CREATININE mg/dL 0.47* 0.57    , Vit D: No results found for: \"VITD25\" , Vit B12: No results found for: \"CSOGXHAI44\" , CRP:   Lab Results   Component Value Date    CRP 1.36 (H) 01/23/2024     Lab Results   Component Value Date    ALBUMIN 3.7 05/02/2024       Nutrition Specific Medications:      Current Facility-Administered Medications:     amLODIPine (Norvasc) tablet 5 mg, 5 mg, oral, Daily, Arnie Wahl MD, 5 mg at 05/02/24 0900    azithromycin (Zithromax) tablet 500 mg, 500 mg, oral, q24h SABRINA, Arnie Wahl MD, 500 mg at 05/02/24 0900    enoxaparin (Lovenox) syringe 40 mg, 40 mg, subcutaneous, Daily, Anrie Wahl MD, 40 mg at 05/02/24 0900    famotidine (Pepcid) tablet 10 mg, 10 mg, oral, BID PRN, Arnie Wahl MD    fluticasone " furoate-vilanteroL (Breo Ellipta) 100-25 mcg/dose inhaler 1 puff, 1 puff, inhalation, Daily, Arnie Wahl MD, 1 puff at 05/02/24 0859    guaiFENesin (Mucinex) 12 hr tablet 1,200 mg, 1,200 mg, oral, BID, Arnie Wahl MD, 1,200 mg at 05/02/24 0900    ipratropium-albuteroL (Duo-Neb) 0.5-2.5 mg/3 mL nebulizer solution 3 mL, 3 mL, nebulization, TID, Arnie Wahl MD, 3 mL at 05/02/24 1350    ipratropium-albuteroL (Duo-Neb) 0.5-2.5 mg/3 mL nebulizer solution 3 mL, 3 mL, nebulization, q2h PRN, Arnie Wahl MD    melatonin tablet 5 mg, 5 mg, oral, Nightly PRN, Arnie Wahl MD    nicotine (Nicoderm CQ) 14 mg/24 hr patch 1 patch, 1 patch, transdermal, Daily, Arnie Wahl MD, 1 patch at 05/02/24 0900    ondansetron (Zofran) injection 4 mg, 4 mg, intravenous, q6h PRN, Arnie Wahl MD    oxygen (O2) therapy, , inhalation, Continuous - Inhalation, John Herzog MD, 3 L/min at 05/02/24 0819    oxygen (O2) therapy, , inhalation, Continuous PRN - O2/gases, Arnie Wahl MD, 3 L/min at 05/02/24 0443    predniSONE (Deltasone) tablet 40 mg, 40 mg, oral, Daily, Arnie Wahl MD, 40 mg at 05/02/24 0900    sennosides-docusate sodium (Alyssa-Colace) 8.6-50 mg per tablet 2 tablet, 2 tablet, oral, Nightly PRN, Arnie Wahl MD   Nursing Data Per flowsheet:   Stool Appearance: Loose, Soft (05/01/24 1800)  Gastrointestinal  Gastrointestinal (WDL): Within Defined Limits  Bowel Incontinence: No  Stool Appearance: Loose, Soft  Stool Color: Brown    Intake/Output Summary (Last 24 hours) at 5/2/2024 1408  Last data filed at 5/2/2024 0900  Gross per 24 hour   Intake 440 ml   Output --   Net 440 ml       Pain Score: 0 - No pain   Dietary Orders (From admission, onward)       Start     Ordered    05/01/24 1253  Adult diet Regular  (Order Panel)  Diet effective now        Question:  Diet type  Answer:  Regular    05/01/24 1252                     Estimated Needs:   Total Energy Estimated Needs (kCal):  (2013 kcal (30  kcal/kg))     Total Protein Estimated Needs (g):  ( g protein (1.2-1.5 g/kg))     Total Fluid Estimated Needs (mL):  (1 ml/kcal)          Nutrition Diagnosis   Malnutrition Diagnosis  Patient has Malnutrition Diagnosis: Yes  Diagnosis Status: New  Malnutrition Diagnosis: Moderate malnutrition related to chronic disease or condition  As Evidenced by: significant 21% weight loss/13 months, loss of muscle and adipose stores, consuming less than 75% of estimated energy requirements >/= 1 month  Additional Assessment Information: ONS added for additional kcal and protein            Nutrition Interventions/Recommendations   Nutrition Prescription:  diet, fluids    Nutrition Interventions:   Interventions: Medical food supplement  Medical Food Supplement: Modified beverage  Goal: Ensure Plus High Protein 2 x/day= 700 kcal, 40 g protein      Coordination of Care: IZZY Crocker  Nutrition Education:   Education Documentation  No documentation found.       Recommendations:  MVI with minerals daily; 100 mg Thiamine daily; Folic acid supplementation   Weights: 2-3 x/week  Continue with Regular diet         Nutrition Monitoring and Evaluation     Food/Nutrient Related History Monitoring  Monitoring and Evaluation Plan: Amount of food  Criteria: Pt will consume 50-75% of meals and ONS provided    Body Composition/Growth/Weight History  Monitoring and Evaluation Plan: Weight  Weight: Measured weight  Criteria: Pt will maintain/increase current weight of 67.1 kg +/- 2 kg    Biochemical Data, Medical Tests and Procedures  Monitoring and Evaluation Plan: Electrolyte/renal panel, Glucose/endocrine profile  Electrolyte and Renal Panel: Calcium, serum, Chloride, Creatinine, Magnesium, Phosphorus, Potassium, Sodium  Criteria: RFP, Mg wnl  Glucose/Endocrine Profile: Glucose, casual  Criteria: BG level  mg/dL         Time Spent (min): 60 minutes    Constitutional: (-) fever  Eyes/ENT: (-) visual changes   Cardiovascular: (+) chest tightness, (-) syncope  Respiratory: (-) cough, (+) shortness of breath  Gastrointestinal: (-) vomiting, (-) diarrhea  Genitourinary: (-) dysuria, (-) hesitancy, (-) frequency   Musculoskeletal: (-) neck pain, (-) back pain, (-) joint pain  Integumentary: (-) rash, (-) edema  Neurological: (-) headache, (-) altered mental status  Allergic/Immunologic: (-) pruritus